# Patient Record
Sex: MALE | Race: WHITE | NOT HISPANIC OR LATINO
[De-identification: names, ages, dates, MRNs, and addresses within clinical notes are randomized per-mention and may not be internally consistent; named-entity substitution may affect disease eponyms.]

---

## 2017-01-05 ENCOUNTER — APPOINTMENT (OUTPATIENT)
Dept: BARIATRICS | Facility: CLINIC | Age: 57
End: 2017-01-05
Payer: COMMERCIAL

## 2017-01-05 VITALS
DIASTOLIC BLOOD PRESSURE: 69 MMHG | HEART RATE: 68 BPM | WEIGHT: 285.05 LBS | BODY MASS INDEX: 50.51 KG/M2 | SYSTOLIC BLOOD PRESSURE: 137 MMHG | HEIGHT: 63 IN

## 2017-01-05 PROCEDURE — 99215 OFFICE O/P EST HI 40 MIN: CPT

## 2017-01-19 ENCOUNTER — OUTPATIENT (OUTPATIENT)
Dept: OUTPATIENT SERVICES | Facility: HOSPITAL | Age: 57
LOS: 1 days | End: 2017-01-19
Payer: COMMERCIAL

## 2017-01-19 VITALS
TEMPERATURE: 99 F | SYSTOLIC BLOOD PRESSURE: 116 MMHG | DIASTOLIC BLOOD PRESSURE: 76 MMHG | HEIGHT: 63 IN | RESPIRATION RATE: 16 BRPM | WEIGHT: 285.06 LBS | HEART RATE: 66 BPM

## 2017-01-19 DIAGNOSIS — E11.9 TYPE 2 DIABETES MELLITUS WITHOUT COMPLICATIONS: ICD-10-CM

## 2017-01-19 DIAGNOSIS — E66.01 MORBID (SEVERE) OBESITY DUE TO EXCESS CALORIES: ICD-10-CM

## 2017-01-19 DIAGNOSIS — Z01.818 ENCOUNTER FOR OTHER PREPROCEDURAL EXAMINATION: ICD-10-CM

## 2017-01-19 DIAGNOSIS — Z98.890 OTHER SPECIFIED POSTPROCEDURAL STATES: Chronic | ICD-10-CM

## 2017-01-19 LAB
ANION GAP SERPL CALC-SCNC: 9 MMOL/L — SIGNIFICANT CHANGE UP (ref 5–17)
BLD GP AB SCN SERPL QL: SIGNIFICANT CHANGE UP
BUN SERPL-MCNC: 25 MG/DL — HIGH (ref 7–23)
CALCIUM SERPL-MCNC: 9.7 MG/DL — SIGNIFICANT CHANGE UP (ref 8.4–10.5)
CHLORIDE SERPL-SCNC: 100 MMOL/L — SIGNIFICANT CHANGE UP (ref 96–108)
CO2 SERPL-SCNC: 31 MMOL/L — SIGNIFICANT CHANGE UP (ref 22–31)
CREAT SERPL-MCNC: 1.29 MG/DL — SIGNIFICANT CHANGE UP (ref 0.5–1.3)
GLUCOSE SERPL-MCNC: 117 MG/DL — HIGH (ref 70–99)
HCT VFR BLD CALC: 36.8 % — LOW (ref 39–50)
HGB BLD-MCNC: 12.6 G/DL — LOW (ref 13–17)
MCHC RBC-ENTMCNC: 28.4 PG — SIGNIFICANT CHANGE UP (ref 27–34)
MCHC RBC-ENTMCNC: 34.4 GM/DL — SIGNIFICANT CHANGE UP (ref 32–36)
MCV RBC AUTO: 82.8 FL — SIGNIFICANT CHANGE UP (ref 80–100)
PLATELET # BLD AUTO: 224 K/UL — SIGNIFICANT CHANGE UP (ref 150–400)
POTASSIUM SERPL-MCNC: 4.8 MMOL/L — SIGNIFICANT CHANGE UP (ref 3.5–5.3)
POTASSIUM SERPL-SCNC: 4.8 MMOL/L — SIGNIFICANT CHANGE UP (ref 3.5–5.3)
RBC # BLD: 4.45 M/UL — SIGNIFICANT CHANGE UP (ref 4.2–5.8)
RBC # FLD: 12.8 % — SIGNIFICANT CHANGE UP (ref 10.3–14.5)
SODIUM SERPL-SCNC: 140 MMOL/L — SIGNIFICANT CHANGE UP (ref 135–145)
WBC # BLD: 9.8 K/UL — SIGNIFICANT CHANGE UP (ref 3.8–10.5)
WBC # FLD AUTO: 9.8 K/UL — SIGNIFICANT CHANGE UP (ref 3.8–10.5)

## 2017-01-19 PROCEDURE — 80048 BASIC METABOLIC PNL TOTAL CA: CPT

## 2017-01-19 PROCEDURE — 36415 COLL VENOUS BLD VENIPUNCTURE: CPT

## 2017-01-19 PROCEDURE — 85027 COMPLETE CBC AUTOMATED: CPT

## 2017-01-19 PROCEDURE — 86850 RBC ANTIBODY SCREEN: CPT

## 2017-01-19 PROCEDURE — 83036 HEMOGLOBIN GLYCOSYLATED A1C: CPT

## 2017-01-19 PROCEDURE — 86901 BLOOD TYPING SEROLOGIC RH(D): CPT

## 2017-01-19 PROCEDURE — 93010 ELECTROCARDIOGRAM REPORT: CPT

## 2017-01-19 PROCEDURE — 93005 ELECTROCARDIOGRAM TRACING: CPT

## 2017-01-19 PROCEDURE — G0463: CPT

## 2017-01-19 PROCEDURE — 86900 BLOOD TYPING SEROLOGIC ABO: CPT

## 2017-01-19 NOTE — H&P PST ADULT - FAMILY HISTORY
Father  Still living? No  Family history of cerebrovascular accident (CVA), Age at diagnosis: Age Unknown     Mother  Still living? No  Family history of coronary artery disease, Age at diagnosis: Age Unknown

## 2017-01-19 NOTE — H&P PST ADULT - PROBLEM SELECTOR PLAN 2
Pt reports allergic reaction to starch in linens and spreads.  This will be relayed to laundry personnel through .

## 2017-01-19 NOTE — H&P PST ADULT - ANESTHESIA, PREVIOUS REACTION, PROFILE
pt becomes extremely violent after general anesthesia, reports punching and kicking and pulling out all tubes.

## 2017-01-19 NOTE — H&P PST ADULT - PSH
History of Arthroscopic Knee Surgery  right  S/P cystoscopy  2003- removal of bladder tumor 2003  S/P Cystoscopy  every year;

## 2017-01-19 NOTE — H&P PST ADULT - HISTORY OF PRESENT ILLNESS
55 yo male presents with long history of morbid obesity and numerous unsuccessful attempts at weight loss.

## 2017-01-20 DIAGNOSIS — T78.40XA ALLERGY, UNSPECIFIED, INITIAL ENCOUNTER: ICD-10-CM

## 2017-01-20 LAB — HBA1C BLD-MCNC: 7.2 % — HIGH (ref 4–5.6)

## 2017-01-26 ENCOUNTER — APPOINTMENT (OUTPATIENT)
Dept: INTERNAL MEDICINE | Facility: CLINIC | Age: 57
End: 2017-01-26

## 2017-01-26 VITALS — WEIGHT: 280 LBS | BODY MASS INDEX: 50.24 KG/M2 | HEIGHT: 62.5 IN

## 2017-01-26 VITALS — SYSTOLIC BLOOD PRESSURE: 130 MMHG | DIASTOLIC BLOOD PRESSURE: 70 MMHG

## 2017-02-01 ENCOUNTER — APPOINTMENT (OUTPATIENT)
Dept: BARIATRICS | Facility: CLINIC | Age: 57
End: 2017-02-01

## 2017-02-01 VITALS
SYSTOLIC BLOOD PRESSURE: 122 MMHG | BODY MASS INDEX: 48.98 KG/M2 | HEIGHT: 63 IN | DIASTOLIC BLOOD PRESSURE: 70 MMHG | WEIGHT: 276.46 LBS

## 2017-02-02 RX ORDER — PANTOPRAZOLE SODIUM 20 MG/1
40 TABLET, DELAYED RELEASE ORAL DAILY
Qty: 0 | Refills: 0 | Status: DISCONTINUED | OUTPATIENT
Start: 2017-02-08 | End: 2017-02-09

## 2017-02-02 RX ORDER — ENOXAPARIN SODIUM 100 MG/ML
40 INJECTION SUBCUTANEOUS EVERY 12 HOURS
Qty: 0 | Refills: 0 | Status: DISCONTINUED | OUTPATIENT
Start: 2017-02-08 | End: 2017-02-09

## 2017-02-02 RX ORDER — DEXTROSE 50 % IN WATER 50 %
25 SYRINGE (ML) INTRAVENOUS ONCE
Qty: 0 | Refills: 0 | Status: DISCONTINUED | OUTPATIENT
Start: 2017-02-08 | End: 2017-02-09

## 2017-02-02 RX ORDER — HYDROMORPHONE HYDROCHLORIDE 2 MG/ML
0.5 INJECTION INTRAMUSCULAR; INTRAVENOUS; SUBCUTANEOUS EVERY 4 HOURS
Qty: 0 | Refills: 0 | Status: DISCONTINUED | OUTPATIENT
Start: 2017-02-08 | End: 2017-02-09

## 2017-02-02 RX ORDER — METOCLOPRAMIDE HCL 10 MG
10 TABLET ORAL EVERY 6 HOURS
Qty: 0 | Refills: 0 | Status: DISCONTINUED | OUTPATIENT
Start: 2017-02-08 | End: 2017-02-09

## 2017-02-02 RX ORDER — HYDROMORPHONE HYDROCHLORIDE 2 MG/ML
1 INJECTION INTRAMUSCULAR; INTRAVENOUS; SUBCUTANEOUS EVERY 4 HOURS
Qty: 0 | Refills: 0 | Status: DISCONTINUED | OUTPATIENT
Start: 2017-02-08 | End: 2017-02-09

## 2017-02-02 RX ORDER — DEXTROSE 50 % IN WATER 50 %
1 SYRINGE (ML) INTRAVENOUS ONCE
Qty: 0 | Refills: 0 | Status: DISCONTINUED | OUTPATIENT
Start: 2017-02-08 | End: 2017-02-09

## 2017-02-02 RX ORDER — DEXTROSE 50 % IN WATER 50 %
12.5 SYRINGE (ML) INTRAVENOUS ONCE
Qty: 0 | Refills: 0 | Status: DISCONTINUED | OUTPATIENT
Start: 2017-02-08 | End: 2017-02-09

## 2017-02-02 RX ORDER — GLUCAGON INJECTION, SOLUTION 0.5 MG/.1ML
1 INJECTION, SOLUTION SUBCUTANEOUS ONCE
Qty: 0 | Refills: 0 | Status: DISCONTINUED | OUTPATIENT
Start: 2017-02-08 | End: 2017-02-09

## 2017-02-02 RX ORDER — SODIUM CHLORIDE 9 MG/ML
1000 INJECTION, SOLUTION INTRAVENOUS
Qty: 0 | Refills: 0 | Status: DISCONTINUED | OUTPATIENT
Start: 2017-02-08 | End: 2017-02-09

## 2017-02-02 RX ORDER — INSULIN LISPRO 100/ML
VIAL (ML) SUBCUTANEOUS
Qty: 0 | Refills: 0 | Status: DISCONTINUED | OUTPATIENT
Start: 2017-02-08 | End: 2017-02-09

## 2017-02-02 RX ORDER — INSULIN LISPRO 100/ML
VIAL (ML) SUBCUTANEOUS AT BEDTIME
Qty: 0 | Refills: 0 | Status: DISCONTINUED | OUTPATIENT
Start: 2017-02-08 | End: 2017-02-09

## 2017-02-02 RX ORDER — ONDANSETRON 8 MG/1
4 TABLET, FILM COATED ORAL EVERY 6 HOURS
Qty: 0 | Refills: 0 | Status: DISCONTINUED | OUTPATIENT
Start: 2017-02-08 | End: 2017-02-09

## 2017-02-06 ENCOUNTER — MEDICATION RENEWAL (OUTPATIENT)
Age: 57
End: 2017-02-06

## 2017-02-07 ENCOUNTER — RESULT REVIEW (OUTPATIENT)
Age: 57
End: 2017-02-07

## 2017-02-07 ENCOUNTER — RESULT CHARGE (OUTPATIENT)
Age: 57
End: 2017-02-07

## 2017-02-07 LAB
BILIRUB UR QL STRIP: NEGATIVE
CLARITY UR: CLEAR
COLLECTION METHOD: NORMAL
GLUCOSE UR-MCNC: NEGATIVE
HCG UR QL: 0.2 EU/DL
HGB UR QL STRIP.AUTO: NEGATIVE
KETONES UR-MCNC: NEGATIVE
LEUKOCYTE ESTERASE UR QL STRIP: NEGATIVE
NITRITE UR QL STRIP: NEGATIVE
PH UR STRIP: 5
PROT UR STRIP-MCNC: NEGATIVE
SP GR UR STRIP: 1.02

## 2017-02-07 NOTE — PATIENT PROFILE ADULT. - PMH
Allergy  STARCH IN SHEETS AND LINENS  Bladder Cancer  since 2003, tumor removed and now reportedly in remission  Cholecystitis    Diabetes Mellitus Type II    Febrile Seizure  during childhood  Herniated Disc  lumbar-  PT CANNOT LIE ON BACK FOR A LONG TIME.  WANTS TO BE POSITIONED ON SIDE WITH 2 TOWELS UNDER HEAD  HTN (Hypertension)    Morbidly Obese    Nevus comedonicus of face  removed 25 years ago, via flap  Obstructive Sleep Apnea  pt uses CPAP at 12 cm, uses nasal mask at home

## 2017-02-08 ENCOUNTER — APPOINTMENT (OUTPATIENT)
Dept: BARIATRICS | Facility: HOSPITAL | Age: 57
End: 2017-02-08

## 2017-02-08 ENCOUNTER — TRANSCRIPTION ENCOUNTER (OUTPATIENT)
Age: 57
End: 2017-02-08

## 2017-02-08 ENCOUNTER — INPATIENT (INPATIENT)
Facility: HOSPITAL | Age: 57
LOS: 0 days | Discharge: HOME | DRG: 621 | End: 2017-02-09
Attending: SURGERY | Admitting: SURGERY
Payer: COMMERCIAL

## 2017-02-08 VITALS
SYSTOLIC BLOOD PRESSURE: 117 MMHG | DIASTOLIC BLOOD PRESSURE: 76 MMHG | OXYGEN SATURATION: 98 % | HEIGHT: 63 IN | TEMPERATURE: 98 F | HEART RATE: 76 BPM | WEIGHT: 270.95 LBS | RESPIRATION RATE: 12 BRPM

## 2017-02-08 DIAGNOSIS — E11.9 TYPE 2 DIABETES MELLITUS WITHOUT COMPLICATIONS: ICD-10-CM

## 2017-02-08 DIAGNOSIS — Z98.84 BARIATRIC SURGERY STATUS: Chronic | ICD-10-CM

## 2017-02-08 DIAGNOSIS — Z98.890 OTHER SPECIFIED POSTPROCEDURAL STATES: Chronic | ICD-10-CM

## 2017-02-08 LAB — ABO RH CONFIRMATION: SIGNIFICANT CHANGE UP

## 2017-02-08 PROCEDURE — 43281 LAP PARAESOPHAG HERN REPAIR: CPT | Mod: 59

## 2017-02-08 PROCEDURE — 43775 LAP SLEEVE GASTRECTOMY: CPT

## 2017-02-08 PROCEDURE — 43775 LAP SLEEVE GASTRECTOMY: CPT | Mod: AS

## 2017-02-08 PROCEDURE — 88305 TISSUE EXAM BY PATHOLOGIST: CPT | Mod: 26

## 2017-02-08 PROCEDURE — 88302 TISSUE EXAM BY PATHOLOGIST: CPT | Mod: 26

## 2017-02-08 PROCEDURE — 43281 LAP PARAESOPHAG HERN REPAIR: CPT | Mod: AS,59

## 2017-02-08 RX ORDER — ACETAMINOPHEN 500 MG
1000 TABLET ORAL ONCE
Qty: 0 | Refills: 0 | Status: COMPLETED | OUTPATIENT
Start: 2017-02-08 | End: 2017-02-08

## 2017-02-08 RX ORDER — SODIUM CHLORIDE 9 MG/ML
1000 INJECTION, SOLUTION INTRAVENOUS
Qty: 0 | Refills: 0 | Status: DISCONTINUED | OUTPATIENT
Start: 2017-02-08 | End: 2017-02-08

## 2017-02-08 RX ORDER — ENOXAPARIN SODIUM 100 MG/ML
40 INJECTION SUBCUTANEOUS ONCE
Qty: 0 | Refills: 0 | Status: COMPLETED | OUTPATIENT
Start: 2017-02-08 | End: 2017-02-08

## 2017-02-08 RX ORDER — ACETAMINOPHEN 500 MG
1000 TABLET ORAL EVERY 6 HOURS
Qty: 0 | Refills: 0 | Status: COMPLETED | OUTPATIENT
Start: 2017-02-08 | End: 2017-02-09

## 2017-02-08 RX ORDER — ONDANSETRON 8 MG/1
4 TABLET, FILM COATED ORAL ONCE
Qty: 0 | Refills: 0 | Status: COMPLETED | OUTPATIENT
Start: 2017-02-08 | End: 2017-02-08

## 2017-02-08 RX ORDER — HYDROMORPHONE HYDROCHLORIDE 2 MG/ML
0.5 INJECTION INTRAMUSCULAR; INTRAVENOUS; SUBCUTANEOUS
Qty: 0 | Refills: 0 | Status: DISCONTINUED | OUTPATIENT
Start: 2017-02-08 | End: 2017-02-08

## 2017-02-08 RX ORDER — APREPITANT 80 MG/1
40 CAPSULE ORAL ONCE
Qty: 0 | Refills: 0 | Status: COMPLETED | OUTPATIENT
Start: 2017-02-08 | End: 2017-02-08

## 2017-02-08 RX ORDER — CIPROFLOXACIN LACTATE 400MG/40ML
400 VIAL (ML) INTRAVENOUS ONCE
Qty: 0 | Refills: 0 | Status: COMPLETED | OUTPATIENT
Start: 2017-02-08 | End: 2017-02-08

## 2017-02-08 RX ADMIN — Medication 1000 MILLIGRAM(S): at 16:15

## 2017-02-08 RX ADMIN — ENOXAPARIN SODIUM 40 MILLIGRAM(S): 100 INJECTION SUBCUTANEOUS at 19:16

## 2017-02-08 RX ADMIN — ENOXAPARIN SODIUM 40 MILLIGRAM(S): 100 INJECTION SUBCUTANEOUS at 07:36

## 2017-02-08 RX ADMIN — Medication 10 MILLIGRAM(S): at 11:45

## 2017-02-08 RX ADMIN — APREPITANT 40 MILLIGRAM(S): 80 CAPSULE ORAL at 07:36

## 2017-02-08 RX ADMIN — Medication 10 MILLIGRAM(S): at 23:39

## 2017-02-08 RX ADMIN — Medication 400 MILLIGRAM(S): at 22:14

## 2017-02-08 RX ADMIN — HYDROMORPHONE HYDROCHLORIDE 0.5 MILLIGRAM(S): 2 INJECTION INTRAMUSCULAR; INTRAVENOUS; SUBCUTANEOUS at 22:33

## 2017-02-08 RX ADMIN — SODIUM CHLORIDE 100 MILLILITER(S): 9 INJECTION, SOLUTION INTRAVENOUS at 11:59

## 2017-02-08 RX ADMIN — SODIUM CHLORIDE 500 MILLILITER(S): 9 INJECTION, SOLUTION INTRAVENOUS at 07:29

## 2017-02-08 RX ADMIN — Medication 10 MILLIGRAM(S): at 18:14

## 2017-02-08 RX ADMIN — ONDANSETRON 4 MILLIGRAM(S): 8 TABLET, FILM COATED ORAL at 22:04

## 2017-02-08 RX ADMIN — ONDANSETRON 4 MILLIGRAM(S): 8 TABLET, FILM COATED ORAL at 14:30

## 2017-02-08 RX ADMIN — Medication 400 MILLIGRAM(S): at 15:45

## 2017-02-08 RX ADMIN — HYDROMORPHONE HYDROCHLORIDE 0.5 MILLIGRAM(S): 2 INJECTION INTRAMUSCULAR; INTRAVENOUS; SUBCUTANEOUS at 22:03

## 2017-02-08 NOTE — DISCHARGE NOTE ADULT - PLAN OF CARE
Instructed to ambulate and use incentive spirometry frequently. Ice packs to abdominal wall and shoulders as needed for discomfort. Cont bariatric diet and follow nutritional guidelines as instructed. Pain meds as needed by MD. Pt instructed  to follow up with Dr. Garcia in 1 week. Restriction of dietary intake and return to normal BMI.

## 2017-02-08 NOTE — DISCHARGE NOTE ADULT - NS AS ACTIVITY OBS
Do not make important decisions/Stairs allowed/Showering allowed/Walking-Outdoors allowed/Driving allowed/Walking-Indoors allowed

## 2017-02-08 NOTE — DISCHARGE NOTE ADULT - FINDINGS/TREATMENT
Tolerated procedure well. Tolerated advancement of diet. Hemodynamically stable. Ambulating without any difficulties. Prepared for discharge to home today. Ice packs to abdominal wall and shoulders as needed for discomfort. Cont bariatric diet and follow nutritional guidelines as instructed. Pain meds as needed by MD.

## 2017-02-08 NOTE — DISCHARGE NOTE ADULT - INSTRUCTIONS
Instructed to ambulate and use incentive spirometry frequently. Ice packs to abdominal wall and shoulders as needed for discomfort. Cont bariatric diet and follow nutritional guidelines as instructed. Pain meds as needed by MD. Pt instructed  to follow up with Dr. Garcia in 1 week.

## 2017-02-08 NOTE — DISCHARGE NOTE ADULT - CARE PROVIDER_API CALL
Angelo Garcia), Surgery  [address] [place] [address]  Phone: [phone]  Fax: [phone]

## 2017-02-08 NOTE — DISCHARGE NOTE ADULT - MEDICATION SUMMARY - MEDICATIONS TO TAKE
I will START or STAY ON the medications listed below when I get home from the hospital:    benazepril 40 mg oral tablet  -- 1 tab(s) by mouth once a day  -- Indication: For Hypertension    doxazosin 8 mg oral tablet  -- 1 tab(s) by mouth once a day  -- Indication: For Hypertension    metFORMIN 500 mg oral tablet  -- 1 tab(s) by mouth 2 times a day  -- Indication: For diabetes    losartan-hydroCHLOROthiazide 50mg-12.5mg oral tablet  -- 1 tab(s) by mouth once a day  -- Indication: For Hypertension    triamterene-hydroCHLOROthiazide 37.5mg-25mg oral tablet  -- 1 tab(s) by mouth once a day  -- Indication: For Hypertension    atenolol 100 mg oral tablet  -- 0.5 tab(s) by mouth 2 times a day  -- Indication: For Hypertension    amLODIPine 10 mg oral tablet  -- 1 tab(s) by mouth once a day  -- Indication: For Hypertension    Klor-Con Sprinkle 8 mEq oral capsule, extended release  -- 1 cap(s) by mouth once a day  -- Indication: For potassium replacement    omeprazole 20 mg oral delayed release capsule  -- 1 cap(s) by mouth once a day  -- It is very important that you take or use this exactly as directed.  Do not skip doses or discontinue unless directed by your doctor.  Obtain medical advice before taking any non-prescription drugs as some may affect the action of this medication.  Swallow whole.  Do not crush.    -- Indication: For antacid    Vitamin D3 1000 intl units oral capsule  -- 1 cap(s) by mouth once a day  -- Indication: For vitamin supplement    Vitamin B12 1000 mcg oral tablet  -- 1 tab(s) by mouth once a day  -- Indication: For vitamin supplement

## 2017-02-08 NOTE — DISCHARGE NOTE ADULT - MEDICATION SUMMARY - MEDICATIONS TO STOP TAKING
I will STOP taking the medications listed below when I get home from the hospital:    Onglyza 5 mg oral tablet  -- 1 tab(s) by mouth once a day    ibuprofen 200 mg oral tablet  -- 1 tab(s) by mouth prn  -- will stop 10 days pre op

## 2017-02-08 NOTE — DISCHARGE NOTE ADULT - HOSPITAL COURSE
57 yo M  with history of morbid obesity s/p laparoscopic sleeve gastrectomy and intra- operative EGD and hiatal hernia repair.  Post operatively patient did well, good urine output and ambulating well on floor. Pt advanced to bariatric clears which she tolerated . Nutritional guidelines were reviewed with the nutritionist. Patient felt ready for discharge to home. Pt instructed to drink small frequent amounts, start protein drinks and follow dietary guidelines. Instructed to ambulate and use incentive spirometry frequently. Pt to follow up with Dr. Garcia in 1 week and call with any questions or concerns.

## 2017-02-08 NOTE — DISCHARGE NOTE ADULT - REASON FOR ADMISSION
55 yo M  with PMHx of morbid obesity admitted to [facility] for scheduled laparoscopic sleeve gastrectomy and intra-operative EGD and hiatal hernia repair.

## 2017-02-08 NOTE — DISCHARGE NOTE ADULT - CARE PROVIDERS DIRECT ADDRESSES
[email or web address]

## 2017-02-08 NOTE — DISCHARGE NOTE ADULT - PATIENT PORTAL LINK FT
“You can access the [facility] Patient Portal, offered by [facility], by registering with the following website: [email or web address]”

## 2017-02-08 NOTE — DISCHARGE NOTE ADULT - CARE PLAN
Principal Discharge DX:	Morbid obesity, unspecified obesity type  Goal:	Restriction of dietary intake and return to normal BMI.  Instructions for follow-up, activity and diet:	Instructed to ambulate and use incentive spirometry frequently. Ice packs to abdominal wall and shoulders as needed for discomfort. Cont bariatric diet and follow nutritional guidelines as instructed. Pain meds as needed by MD. Pt instructed  to follow up with Dr. Garcia in 1 week.  Secondary Diagnosis:	S/P laparoscopic sleeve gastrectomy  Goal:	Restriction of dietary intake and return to normal BMI.  Instructions for follow-up, activity and diet:	Instructed to ambulate and use incentive spirometry frequently. Ice packs to abdominal wall and shoulders as needed for discomfort. Cont bariatric diet and follow nutritional guidelines as instructed. Pain meds as needed by MD. Pt instructed  to follow up with Dr. Garcia in 1 week.  Secondary Diagnosis:	Hiatal hernia  Goal:	Restriction of dietary intake and return to normal BMI.  Instructions for follow-up, activity and diet:	Instructed to ambulate and use incentive spirometry frequently. Ice packs to abdominal wall and shoulders as needed for discomfort. Cont bariatric diet and follow nutritional guidelines as instructed. Pain meds as needed by MD. Pt instructed  to follow up with Dr. Garcia in 1 week.

## 2017-02-09 ENCOUNTER — TRANSCRIPTION ENCOUNTER (OUTPATIENT)
Age: 57
End: 2017-02-09

## 2017-02-09 VITALS
OXYGEN SATURATION: 98 % | TEMPERATURE: 98 F | HEART RATE: 73 BPM | SYSTOLIC BLOOD PRESSURE: 118 MMHG | RESPIRATION RATE: 18 BRPM | DIASTOLIC BLOOD PRESSURE: 72 MMHG

## 2017-02-09 LAB
ANION GAP SERPL CALC-SCNC: 5 MMOL/L — SIGNIFICANT CHANGE UP (ref 5–17)
BASOPHILS # BLD AUTO: 0 K/UL — SIGNIFICANT CHANGE UP (ref 0–0.2)
BASOPHILS NFR BLD AUTO: 0.4 % — SIGNIFICANT CHANGE UP (ref 0–2)
BUN SERPL-MCNC: 24 MG/DL — HIGH (ref 7–23)
CALCIUM SERPL-MCNC: 9.7 MG/DL — SIGNIFICANT CHANGE UP (ref 8.4–10.5)
CHLORIDE SERPL-SCNC: 102 MMOL/L — SIGNIFICANT CHANGE UP (ref 96–108)
CO2 SERPL-SCNC: 29 MMOL/L — SIGNIFICANT CHANGE UP (ref 22–31)
CREAT SERPL-MCNC: 1.18 MG/DL — SIGNIFICANT CHANGE UP (ref 0.5–1.3)
EOSINOPHIL # BLD AUTO: 0 K/UL — SIGNIFICANT CHANGE UP (ref 0–0.5)
EOSINOPHIL NFR BLD AUTO: 0.4 % — SIGNIFICANT CHANGE UP (ref 0–6)
GLUCOSE SERPL-MCNC: 122 MG/DL — HIGH (ref 70–99)
HCT VFR BLD CALC: 33.5 % — LOW (ref 39–50)
HGB BLD-MCNC: 11 G/DL — LOW (ref 13–17)
LYMPHOCYTES # BLD AUTO: 0.8 K/UL — LOW (ref 1–3.3)
LYMPHOCYTES # BLD AUTO: 7.2 % — LOW (ref 13–44)
MCHC RBC-ENTMCNC: 28.8 PG — SIGNIFICANT CHANGE UP (ref 27–34)
MCHC RBC-ENTMCNC: 32.8 GM/DL — SIGNIFICANT CHANGE UP (ref 32–36)
MCV RBC AUTO: 87.9 FL — SIGNIFICANT CHANGE UP (ref 80–100)
MONOCYTES # BLD AUTO: 1.2 K/UL — HIGH (ref 0–0.9)
MONOCYTES NFR BLD AUTO: 10.3 % — SIGNIFICANT CHANGE UP (ref 2–14)
NEUTROPHILS # BLD AUTO: 9.6 K/UL — HIGH (ref 1.8–7.4)
NEUTROPHILS NFR BLD AUTO: 81.8 % — HIGH (ref 43–77)
PLATELET # BLD AUTO: 206 K/UL — SIGNIFICANT CHANGE UP (ref 150–400)
POTASSIUM SERPL-MCNC: 4.3 MMOL/L — SIGNIFICANT CHANGE UP (ref 3.5–5.3)
POTASSIUM SERPL-SCNC: 4.3 MMOL/L — SIGNIFICANT CHANGE UP (ref 3.5–5.3)
RBC # BLD: 3.82 M/UL — LOW (ref 4.2–5.8)
RBC # FLD: 12.4 % — SIGNIFICANT CHANGE UP (ref 10.3–14.5)
SODIUM SERPL-SCNC: 136 MMOL/L — SIGNIFICANT CHANGE UP (ref 135–145)
WBC # BLD: 11.8 K/UL — HIGH (ref 3.8–10.5)
WBC # FLD AUTO: 11.8 K/UL — HIGH (ref 3.8–10.5)

## 2017-02-09 PROCEDURE — 99024 POSTOP FOLLOW-UP VISIT: CPT

## 2017-02-09 RX ORDER — OMEPRAZOLE 10 MG/1
1 CAPSULE, DELAYED RELEASE ORAL
Qty: 30 | Refills: 3 | OUTPATIENT
Start: 2017-02-09 | End: 2017-06-08

## 2017-02-09 RX ORDER — OXYCODONE HYDROCHLORIDE 5 MG/1
1 TABLET ORAL
Qty: 20 | Refills: 0 | OUTPATIENT
Start: 2017-02-09 | End: 2017-02-14

## 2017-02-09 RX ORDER — IBUPROFEN 200 MG
1 TABLET ORAL
Qty: 0 | Refills: 0 | COMMUNITY

## 2017-02-09 RX ADMIN — ENOXAPARIN SODIUM 40 MILLIGRAM(S): 100 INJECTION SUBCUTANEOUS at 06:30

## 2017-02-09 RX ADMIN — ONDANSETRON 4 MILLIGRAM(S): 8 TABLET, FILM COATED ORAL at 02:31

## 2017-02-09 RX ADMIN — Medication 10 MILLIGRAM(S): at 17:46

## 2017-02-09 RX ADMIN — HYDROMORPHONE HYDROCHLORIDE 0.5 MILLIGRAM(S): 2 INJECTION INTRAMUSCULAR; INTRAVENOUS; SUBCUTANEOUS at 13:00

## 2017-02-09 RX ADMIN — HYDROMORPHONE HYDROCHLORIDE 0.5 MILLIGRAM(S): 2 INJECTION INTRAMUSCULAR; INTRAVENOUS; SUBCUTANEOUS at 18:05

## 2017-02-09 RX ADMIN — Medication 10 MILLIGRAM(S): at 12:05

## 2017-02-09 RX ADMIN — ONDANSETRON 4 MILLIGRAM(S): 8 TABLET, FILM COATED ORAL at 14:20

## 2017-02-09 RX ADMIN — HYDROMORPHONE HYDROCHLORIDE 0.5 MILLIGRAM(S): 2 INJECTION INTRAMUSCULAR; INTRAVENOUS; SUBCUTANEOUS at 12:04

## 2017-02-09 RX ADMIN — Medication 10 MILLIGRAM(S): at 06:30

## 2017-02-09 RX ADMIN — Medication 1000 MILLIGRAM(S): at 06:27

## 2017-02-09 RX ADMIN — Medication 1000 MILLIGRAM(S): at 01:46

## 2017-02-09 RX ADMIN — Medication 400 MILLIGRAM(S): at 02:31

## 2017-02-09 RX ADMIN — ONDANSETRON 4 MILLIGRAM(S): 8 TABLET, FILM COATED ORAL at 09:01

## 2017-02-09 RX ADMIN — PANTOPRAZOLE SODIUM 40 MILLIGRAM(S): 20 TABLET, DELAYED RELEASE ORAL at 12:05

## 2017-02-09 RX ADMIN — HYDROMORPHONE HYDROCHLORIDE 0.5 MILLIGRAM(S): 2 INJECTION INTRAMUSCULAR; INTRAVENOUS; SUBCUTANEOUS at 17:46

## 2017-02-10 PROBLEM — K81.9 CHOLECYSTITIS, UNSPECIFIED: Chronic | Status: ACTIVE | Noted: 2017-01-19

## 2017-02-10 PROBLEM — Q82.5 CONGENITAL NON-NEOPLASTIC NEVUS: Chronic | Status: ACTIVE | Noted: 2017-01-19

## 2017-02-10 PROBLEM — T78.40XA ALLERGY, UNSPECIFIED, INITIAL ENCOUNTER: Chronic | Status: ACTIVE | Noted: 2017-01-20

## 2017-02-15 ENCOUNTER — APPOINTMENT (OUTPATIENT)
Dept: BARIATRICS | Facility: CLINIC | Age: 57
End: 2017-02-15

## 2017-02-15 VITALS
SYSTOLIC BLOOD PRESSURE: 108 MMHG | HEIGHT: 63 IN | BODY MASS INDEX: 47.11 KG/M2 | DIASTOLIC BLOOD PRESSURE: 76 MMHG | WEIGHT: 265.87 LBS

## 2017-02-16 ENCOUNTER — CHART COPY (OUTPATIENT)
Age: 57
End: 2017-02-16

## 2017-02-18 PROCEDURE — 94664 DEMO&/EVAL PT USE INHALER: CPT

## 2017-02-18 PROCEDURE — 85027 COMPLETE CBC AUTOMATED: CPT

## 2017-02-18 PROCEDURE — 88302 TISSUE EXAM BY PATHOLOGIST: CPT

## 2017-02-18 PROCEDURE — 94660 CPAP INITIATION&MGMT: CPT

## 2017-02-18 PROCEDURE — C1889: CPT

## 2017-02-18 PROCEDURE — 88305 TISSUE EXAM BY PATHOLOGIST: CPT

## 2017-02-18 PROCEDURE — 36415 COLL VENOUS BLD VENIPUNCTURE: CPT

## 2017-02-18 PROCEDURE — 80048 BASIC METABOLIC PNL TOTAL CA: CPT

## 2017-02-22 ENCOUNTER — RECORD ABSTRACTING (OUTPATIENT)
Age: 57
End: 2017-02-22

## 2017-02-23 ENCOUNTER — RESULT CHARGE (OUTPATIENT)
Age: 57
End: 2017-02-23

## 2017-02-23 ENCOUNTER — LABORATORY RESULT (OUTPATIENT)
Age: 57
End: 2017-02-23

## 2017-02-23 ENCOUNTER — APPOINTMENT (OUTPATIENT)
Dept: INTERNAL MEDICINE | Facility: CLINIC | Age: 57
End: 2017-02-23

## 2017-02-23 VITALS — BODY MASS INDEX: 47.11 KG/M2 | WEIGHT: 256 LBS | HEIGHT: 62 IN

## 2017-02-23 VITALS — SYSTOLIC BLOOD PRESSURE: 110 MMHG | DIASTOLIC BLOOD PRESSURE: 70 MMHG

## 2017-02-24 LAB
25(OH)D3 SERPL-MCNC: 44.1 NG/ML
ALBUMIN SERPL ELPH-MCNC: 4.6 G/DL
ALP BLD-CCNC: 79 U/L
ALT SERPL-CCNC: 26 U/L
ANION GAP SERPL CALC-SCNC: 22 MMOL/L
AST SERPL-CCNC: 19 U/L
BASOPHILS # BLD AUTO: 0.07 K/UL
BASOPHILS NFR BLD AUTO: 0.9 %
BILIRUB SERPL-MCNC: 0.4 MG/DL
BUN SERPL-MCNC: 47 MG/DL
CALCIUM SERPL-MCNC: 10.9 MG/DL
CHLORIDE SERPL-SCNC: 96 MMOL/L
CHOLEST SERPL-MCNC: 131 MG/DL
CHOLEST/HDLC SERPL: 3.7 RATIO
CO2 SERPL-SCNC: 21 MMOL/L
CREAT SERPL-MCNC: 1.9 MG/DL
EOSINOPHIL # BLD AUTO: 0.43 K/UL
EOSINOPHIL NFR BLD AUTO: 5.8 %
GLUCOSE SERPL-MCNC: 98 MG/DL
HBA1C MFR BLD HPLC: 6.3 %
HCT VFR BLD CALC: 38.8 %
HDLC SERPL-MCNC: 35 MG/DL
HGB BLD-MCNC: 12.8 G/DL
IMM GRANULOCYTES NFR BLD AUTO: 0 %
LDLC SERPL CALC-MCNC: 55 MG/DL
LYMPHOCYTES # BLD AUTO: 1.36 K/UL
LYMPHOCYTES NFR BLD AUTO: 18.4 %
MAN DIFF?: NORMAL
MCHC RBC-ENTMCNC: 28.9 PG
MCHC RBC-ENTMCNC: 33 GM/DL
MCV RBC AUTO: 87.6 FL
MONOCYTES # BLD AUTO: 0.88 K/UL
MONOCYTES NFR BLD AUTO: 11.9 %
NEUTROPHILS # BLD AUTO: 4.65 K/UL
NEUTROPHILS NFR BLD AUTO: 63 %
PLATELET # BLD AUTO: 283 K/UL
POTASSIUM SERPL-SCNC: 4.2 MMOL/L
PROT SERPL-MCNC: 8.4 G/DL
RBC # BLD: 4.43 M/UL
RBC # FLD: 14.2 %
SAVE SPECIMEN: NORMAL
SODIUM SERPL-SCNC: 139 MMOL/L
T3RU NFR SERPL: 0.95 INDEX
T4 SERPL-MCNC: 9.7 UG/DL
TRIGL SERPL-MCNC: 204 MG/DL
TSH SERPL-ACNC: 0.9 UIU/ML
URATE SERPL-MCNC: 12.1 MG/DL
WBC # FLD AUTO: 7.39 K/UL

## 2017-03-06 ENCOUNTER — RX RENEWAL (OUTPATIENT)
Age: 57
End: 2017-03-06

## 2017-03-23 ENCOUNTER — OUTPATIENT (OUTPATIENT)
Dept: OUTPATIENT SERVICES | Facility: HOSPITAL | Age: 57
LOS: 1 days | End: 2017-03-23
Payer: COMMERCIAL

## 2017-03-23 ENCOUNTER — APPOINTMENT (OUTPATIENT)
Dept: BARIATRICS | Facility: CLINIC | Age: 57
End: 2017-03-23

## 2017-03-23 VITALS
DIASTOLIC BLOOD PRESSURE: 52 MMHG | HEIGHT: 62 IN | WEIGHT: 235.01 LBS | BODY MASS INDEX: 43.25 KG/M2 | SYSTOLIC BLOOD PRESSURE: 96 MMHG

## 2017-03-23 DIAGNOSIS — E66.01 MORBID (SEVERE) OBESITY DUE TO EXCESS CALORIES: ICD-10-CM

## 2017-03-23 DIAGNOSIS — Z98.84 BARIATRIC SURGERY STATUS: ICD-10-CM

## 2017-03-23 DIAGNOSIS — Z98.890 OTHER SPECIFIED POSTPROCEDURAL STATES: Chronic | ICD-10-CM

## 2017-03-23 DIAGNOSIS — R11.10 VOMITING, UNSPECIFIED: ICD-10-CM

## 2017-03-23 PROCEDURE — 74220 X-RAY XM ESOPHAGUS 1CNTRST: CPT | Mod: 26

## 2017-03-23 PROCEDURE — 74220 X-RAY XM ESOPHAGUS 1CNTRST: CPT

## 2017-03-23 RX ORDER — OXYCODONE AND ACETAMINOPHEN 5; 325 MG/1; MG/1
5-325 TABLET ORAL
Qty: 20 | Refills: 0 | Status: DISCONTINUED | COMMUNITY
Start: 2017-02-09 | End: 2017-03-23

## 2017-04-02 ENCOUNTER — INPATIENT (INPATIENT)
Facility: HOSPITAL | Age: 57
LOS: 3 days | Discharge: HOME | DRG: 683 | End: 2017-04-06
Attending: SURGERY | Admitting: SURGERY
Payer: MEDICAID

## 2017-04-02 VITALS
RESPIRATION RATE: 16 BRPM | HEART RATE: 70 BPM | SYSTOLIC BLOOD PRESSURE: 118 MMHG | DIASTOLIC BLOOD PRESSURE: 58 MMHG | WEIGHT: 231.04 LBS | TEMPERATURE: 98 F | OXYGEN SATURATION: 100 % | HEIGHT: 63 IN

## 2017-04-02 DIAGNOSIS — E11.9 TYPE 2 DIABETES MELLITUS WITHOUT COMPLICATIONS: ICD-10-CM

## 2017-04-02 DIAGNOSIS — R11.10 VOMITING, UNSPECIFIED: ICD-10-CM

## 2017-04-02 DIAGNOSIS — N17.9 ACUTE KIDNEY FAILURE, UNSPECIFIED: ICD-10-CM

## 2017-04-02 DIAGNOSIS — R11.2 NAUSEA WITH VOMITING, UNSPECIFIED: ICD-10-CM

## 2017-04-02 DIAGNOSIS — Z98.84 BARIATRIC SURGERY STATUS: Chronic | ICD-10-CM

## 2017-04-02 DIAGNOSIS — G47.33 OBSTRUCTIVE SLEEP APNEA (ADULT) (PEDIATRIC): ICD-10-CM

## 2017-04-02 DIAGNOSIS — N28.9 DISORDER OF KIDNEY AND URETER, UNSPECIFIED: ICD-10-CM

## 2017-04-02 DIAGNOSIS — I10 ESSENTIAL (PRIMARY) HYPERTENSION: ICD-10-CM

## 2017-04-02 DIAGNOSIS — E66.01 MORBID (SEVERE) OBESITY DUE TO EXCESS CALORIES: ICD-10-CM

## 2017-04-02 DIAGNOSIS — Z98.890 OTHER SPECIFIED POSTPROCEDURAL STATES: Chronic | ICD-10-CM

## 2017-04-02 DIAGNOSIS — Z29.9 ENCOUNTER FOR PROPHYLACTIC MEASURES, UNSPECIFIED: ICD-10-CM

## 2017-04-02 LAB
ALBUMIN SERPL ELPH-MCNC: 4.2 G/DL — SIGNIFICANT CHANGE UP (ref 3.3–5)
ALP SERPL-CCNC: 87 U/L — SIGNIFICANT CHANGE UP (ref 30–120)
ALT FLD-CCNC: 54 U/L DA — SIGNIFICANT CHANGE UP (ref 10–60)
ANION GAP SERPL CALC-SCNC: 14 MMOL/L — SIGNIFICANT CHANGE UP (ref 5–17)
APTT BLD: 24.8 SEC — LOW (ref 27.5–37.4)
AST SERPL-CCNC: 33 U/L — SIGNIFICANT CHANGE UP (ref 10–40)
BASOPHILS # BLD AUTO: 0.1 K/UL — SIGNIFICANT CHANGE UP (ref 0–0.2)
BASOPHILS NFR BLD AUTO: 1.6 % — SIGNIFICANT CHANGE UP (ref 0–2)
BILIRUB SERPL-MCNC: 0.9 MG/DL — SIGNIFICANT CHANGE UP (ref 0.2–1.2)
BUN SERPL-MCNC: 65 MG/DL — HIGH (ref 7–23)
CALCIUM SERPL-MCNC: 11 MG/DL — HIGH (ref 8.4–10.5)
CHLORIDE SERPL-SCNC: 102 MMOL/L — SIGNIFICANT CHANGE UP (ref 96–108)
CO2 SERPL-SCNC: 28 MMOL/L — SIGNIFICANT CHANGE UP (ref 22–31)
CREAT SERPL-MCNC: 3.16 MG/DL — HIGH (ref 0.5–1.3)
EOSINOPHIL # BLD AUTO: 0.1 K/UL — SIGNIFICANT CHANGE UP (ref 0–0.5)
EOSINOPHIL NFR BLD AUTO: 1.2 % — SIGNIFICANT CHANGE UP (ref 0–6)
GLUCOSE SERPL-MCNC: 124 MG/DL — HIGH (ref 70–99)
HCT VFR BLD CALC: 40.6 % — SIGNIFICANT CHANGE UP (ref 39–50)
HGB BLD-MCNC: 13.9 G/DL — SIGNIFICANT CHANGE UP (ref 13–17)
INR BLD: 1.22 RATIO — HIGH (ref 0.88–1.16)
LIDOCAIN IGE QN: 315 U/L — SIGNIFICANT CHANGE UP (ref 73–393)
LYMPHOCYTES # BLD AUTO: 0.6 K/UL — LOW (ref 1–3.3)
LYMPHOCYTES # BLD AUTO: 10 % — LOW (ref 13–44)
MCHC RBC-ENTMCNC: 29.5 PG — SIGNIFICANT CHANGE UP (ref 27–34)
MCHC RBC-ENTMCNC: 34.2 GM/DL — SIGNIFICANT CHANGE UP (ref 32–36)
MCV RBC AUTO: 86.1 FL — SIGNIFICANT CHANGE UP (ref 80–100)
MONOCYTES # BLD AUTO: 1 K/UL — HIGH (ref 0–0.9)
MONOCYTES NFR BLD AUTO: 16.2 % — HIGH (ref 2–14)
NEUTROPHILS # BLD AUTO: 4.6 K/UL — SIGNIFICANT CHANGE UP (ref 1.8–7.4)
NEUTROPHILS NFR BLD AUTO: 71.2 % — SIGNIFICANT CHANGE UP (ref 43–77)
PLATELET # BLD AUTO: 184 K/UL — SIGNIFICANT CHANGE UP (ref 150–400)
POTASSIUM SERPL-MCNC: 4.5 MMOL/L — SIGNIFICANT CHANGE UP (ref 3.5–5.3)
POTASSIUM SERPL-SCNC: 4.5 MMOL/L — SIGNIFICANT CHANGE UP (ref 3.5–5.3)
PROT SERPL-MCNC: 8.3 G/DL — SIGNIFICANT CHANGE UP (ref 6–8.3)
PROTHROM AB SERPL-ACNC: 13.3 SEC — HIGH (ref 9.8–12.7)
RBC # BLD: 4.72 M/UL — SIGNIFICANT CHANGE UP (ref 4.2–5.8)
RBC # FLD: 12.5 % — SIGNIFICANT CHANGE UP (ref 10.3–14.5)
SODIUM SERPL-SCNC: 144 MMOL/L — SIGNIFICANT CHANGE UP (ref 135–145)
WBC # BLD: 6.4 K/UL — SIGNIFICANT CHANGE UP (ref 3.8–10.5)
WBC # FLD AUTO: 6.4 K/UL — SIGNIFICANT CHANGE UP (ref 3.8–10.5)

## 2017-04-02 PROCEDURE — 74176 CT ABD & PELVIS W/O CONTRAST: CPT | Mod: 26

## 2017-04-02 PROCEDURE — 99284 EMERGENCY DEPT VISIT MOD MDM: CPT

## 2017-04-02 PROCEDURE — 93010 ELECTROCARDIOGRAM REPORT: CPT

## 2017-04-02 PROCEDURE — 99222 1ST HOSP IP/OBS MODERATE 55: CPT

## 2017-04-02 PROCEDURE — 99223 1ST HOSP IP/OBS HIGH 75: CPT | Mod: 24

## 2017-04-02 RX ORDER — METOPROLOL TARTRATE 50 MG
5 TABLET ORAL EVERY 6 HOURS
Qty: 0 | Refills: 0 | Status: DISCONTINUED | OUTPATIENT
Start: 2017-04-02 | End: 2017-04-05

## 2017-04-02 RX ORDER — DEXTROSE 50 % IN WATER 50 %
12.5 SYRINGE (ML) INTRAVENOUS ONCE
Qty: 0 | Refills: 0 | Status: DISCONTINUED | OUTPATIENT
Start: 2017-04-02 | End: 2017-04-06

## 2017-04-02 RX ORDER — GLUCAGON INJECTION, SOLUTION 0.5 MG/.1ML
1 INJECTION, SOLUTION SUBCUTANEOUS ONCE
Qty: 0 | Refills: 0 | Status: DISCONTINUED | OUTPATIENT
Start: 2017-04-02 | End: 2017-04-06

## 2017-04-02 RX ORDER — SODIUM CHLORIDE 9 MG/ML
2000 INJECTION INTRAMUSCULAR; INTRAVENOUS; SUBCUTANEOUS ONCE
Qty: 0 | Refills: 0 | Status: DISCONTINUED | OUTPATIENT
Start: 2017-04-02 | End: 2017-04-02

## 2017-04-02 RX ORDER — ONDANSETRON 8 MG/1
4 TABLET, FILM COATED ORAL EVERY 4 HOURS
Qty: 0 | Refills: 0 | Status: DISCONTINUED | OUTPATIENT
Start: 2017-04-02 | End: 2017-04-02

## 2017-04-02 RX ORDER — INSULIN LISPRO 100/ML
VIAL (ML) SUBCUTANEOUS
Qty: 0 | Refills: 0 | Status: DISCONTINUED | OUTPATIENT
Start: 2017-04-02 | End: 2017-04-06

## 2017-04-02 RX ORDER — DEXTROSE 50 % IN WATER 50 %
25 SYRINGE (ML) INTRAVENOUS ONCE
Qty: 0 | Refills: 0 | Status: DISCONTINUED | OUTPATIENT
Start: 2017-04-02 | End: 2017-04-06

## 2017-04-02 RX ORDER — METOPROLOL TARTRATE 50 MG
5 TABLET ORAL EVERY 6 HOURS
Qty: 0 | Refills: 0 | Status: DISCONTINUED | OUTPATIENT
Start: 2017-04-02 | End: 2017-04-02

## 2017-04-02 RX ORDER — INSULIN LISPRO 100/ML
VIAL (ML) SUBCUTANEOUS AT BEDTIME
Qty: 0 | Refills: 0 | Status: DISCONTINUED | OUTPATIENT
Start: 2017-04-02 | End: 2017-04-06

## 2017-04-02 RX ORDER — ONDANSETRON 8 MG/1
4 TABLET, FILM COATED ORAL ONCE
Qty: 0 | Refills: 0 | Status: COMPLETED | OUTPATIENT
Start: 2017-04-02 | End: 2017-04-02

## 2017-04-02 RX ORDER — DEXTROSE 50 % IN WATER 50 %
1 SYRINGE (ML) INTRAVENOUS ONCE
Qty: 0 | Refills: 0 | Status: DISCONTINUED | OUTPATIENT
Start: 2017-04-02 | End: 2017-04-06

## 2017-04-02 RX ORDER — SODIUM CHLORIDE 9 MG/ML
1000 INJECTION, SOLUTION INTRAVENOUS
Qty: 0 | Refills: 0 | Status: DISCONTINUED | OUTPATIENT
Start: 2017-04-02 | End: 2017-04-06

## 2017-04-02 RX ORDER — SODIUM CHLORIDE 9 MG/ML
1000 INJECTION, SOLUTION INTRAVENOUS
Qty: 0 | Refills: 0 | Status: DISCONTINUED | OUTPATIENT
Start: 2017-04-02 | End: 2017-04-03

## 2017-04-02 RX ORDER — SODIUM CHLORIDE 9 MG/ML
1000 INJECTION INTRAMUSCULAR; INTRAVENOUS; SUBCUTANEOUS ONCE
Qty: 0 | Refills: 0 | Status: COMPLETED | OUTPATIENT
Start: 2017-04-02 | End: 2017-04-02

## 2017-04-02 RX ORDER — METOCLOPRAMIDE HCL 10 MG
10 TABLET ORAL EVERY 6 HOURS
Qty: 0 | Refills: 0 | Status: DISCONTINUED | OUTPATIENT
Start: 2017-04-02 | End: 2017-04-02

## 2017-04-02 RX ORDER — METOCLOPRAMIDE HCL 10 MG
10 TABLET ORAL EVERY 6 HOURS
Qty: 0 | Refills: 0 | Status: DISCONTINUED | OUTPATIENT
Start: 2017-04-02 | End: 2017-04-03

## 2017-04-02 RX ORDER — ONDANSETRON 8 MG/1
4 TABLET, FILM COATED ORAL EVERY 4 HOURS
Qty: 0 | Refills: 0 | Status: DISCONTINUED | OUTPATIENT
Start: 2017-04-02 | End: 2017-04-06

## 2017-04-02 RX ORDER — ENOXAPARIN SODIUM 100 MG/ML
40 INJECTION SUBCUTANEOUS DAILY
Qty: 0 | Refills: 0 | Status: DISCONTINUED | OUTPATIENT
Start: 2017-04-02 | End: 2017-04-06

## 2017-04-02 RX ORDER — SODIUM CHLORIDE 9 MG/ML
1000 INJECTION, SOLUTION INTRAVENOUS
Qty: 0 | Refills: 0 | Status: DISCONTINUED | OUTPATIENT
Start: 2017-04-02 | End: 2017-04-04

## 2017-04-02 RX ADMIN — SODIUM CHLORIDE 100 MILLILITER(S): 9 INJECTION, SOLUTION INTRAVENOUS at 17:42

## 2017-04-02 RX ADMIN — SODIUM CHLORIDE 100 MILLILITER(S): 9 INJECTION, SOLUTION INTRAVENOUS at 19:06

## 2017-04-02 RX ADMIN — SODIUM CHLORIDE 1000 MILLILITER(S): 9 INJECTION INTRAMUSCULAR; INTRAVENOUS; SUBCUTANEOUS at 16:27

## 2017-04-02 RX ADMIN — ONDANSETRON 4 MILLIGRAM(S): 8 TABLET, FILM COATED ORAL at 16:28

## 2017-04-02 RX ADMIN — SODIUM CHLORIDE 100 MILLILITER(S): 9 INJECTION, SOLUTION INTRAVENOUS at 21:32

## 2017-04-02 NOTE — H&P ADULT - NSHPPHYSICALEXAM_GEN_ALL_CORE
PHYSICAL EXAM:      Constitutional: A&O x3    Eyes: Corrective lenses.  REBECA, EOMI    Neck:  Supple no masses.  NO lymphadenopathy    Back:  No CVA tenderness    Respiratory: Clear bilaterally.  No wheeze, rhonchi, rales    Cardiovascular: RRR    Gastrointestinal: obese, soft, nontender, nondistended, positive bowel sounds.  Surgical incisions remain well approximated and healing appropriately without erythema, induration, fluctuance or discharge.    Vascular: Distal pulses intact.  No edma or calf tenderness    Lymph Nodes:  No lymphadenopathy    Musculoskeletal:  Ambulating without difficulty or assistance    Psychiatric: Anxious

## 2017-04-02 NOTE — CONSULT NOTE ADULT - PROBLEM SELECTOR RECOMMENDATION 2
- since holding losartan-HCTZ, benazepril, will put in IV lopressor as PRN order (currently BP is wnl)  - hold amlodipine  - hold atenolol (will be on IV lopressor as needed)  - can add IV diltizem or hydralazine as needed as well

## 2017-04-02 NOTE — ED ADULT NURSE NOTE - OBJECTIVE STATEMENT
patient walked into ER c/o unable to keep any liquids down for 4 weeks s/p gastric sleeve 2/8/17, having nausea and vomiting

## 2017-04-02 NOTE — H&P ADULT - PSH
History of Arthroscopic Knee Surgery  right  S/P Cystoscopy  every year;  S/P cystoscopy  2003- removal of bladder tumor 2003  S/P laparoscopic sleeve gastrectomy

## 2017-04-02 NOTE — ED PROVIDER NOTE - OBJECTIVE STATEMENT
recently, including today, severe  nausea ,profuse vomiting,odynophagia with liquids and dysphagia..had sleeve gastrectomy in february.referred to er by his surgeon.had recent upper endoscopy for same c/c and found to have concretions in stomach despite being on mostly liquid diet.no fever.

## 2017-04-02 NOTE — H&P ADULT - NSHPLABSRESULTS_GEN_ALL_CORE
13.9   6.4   )-----------( 184      ( 02 Apr 2017 16:26 )             40.6   02 Apr 2017 16:26    144    |  102    |  65     ----------------------------<  124    4.5     |  28     |  3.16     Ca    11.0       02 Apr 2017 16:26    TPro  8.3    /  Alb  4.2    /  TBili  0.9    /  DBili  x      /  AST  33     /  ALT  54     /  AlkPhos  87     02 Apr 2017 16:26    02 Apr 2017 16:26    144    |  102    |  65     ----------------------------<  124    4.5     |  28     |  3.16     Ca    11.0       02 Apr 2017 16:26    TPro  8.3    /  Alb  4.2    /  TBili  0.9    /  DBili  x      /  AST  33     /  ALT  54     /  AlkPhos  87     02 Apr 2017 16:26    CT abd/pelvis with PO contrast only:  Official report pending.  No signs of obstruction.  Sleeve appears patent without bezoar or obstructing lesion.

## 2017-04-02 NOTE — PATIENT PROFILE ADULT. - NS TRANSFER PATIENT BELONGINGS
Clothing/Cell Phone/PDA (specify)/wallet ($15 in bills $3.16 in change) - and keys -- will be locked in security/Jewelry/Money (specify)

## 2017-04-02 NOTE — H&P ADULT - PROBLEM SELECTOR PLAN 3
Weight loss and management.  Continued Bariatric post operative education and reinforcement of nutritional recommendations.

## 2017-04-02 NOTE — H&P ADULT - PROBLEM SELECTOR PLAN 1
Admit for IVF resuscitation and Nutritional counseling.  Zofran/Reglan  GI follow up (Ravinder/Jennifer)  Bariatric Clear liquid diet as tolerated.

## 2017-04-02 NOTE — ED PROVIDER NOTE - MEDICAL DECISION MAKING DETAILS
surgeon requests admission for iv hydration and meds...will have pt endoscoped again. surgeon requests admission for iv hydration and meds...will have to be scoped again but renal insuff can clearly worsen n/v sx

## 2017-04-02 NOTE — H&P ADULT - FAMILY HISTORY
Father  Still living? Unknown  Family history of cerebrovascular accident (CVA), Age at diagnosis: Age Unknown  Family history of coronary artery disease, Age at diagnosis: Age Unknown

## 2017-04-02 NOTE — ED PROVIDER NOTE - CARE PLAN
Principal Discharge DX:	Intractable vomiting  Secondary Diagnosis:	Dehydration Principal Discharge DX:	Intractable vomiting  Secondary Diagnosis:	Acute renal insufficiency

## 2017-04-02 NOTE — ED ADULT TRIAGE NOTE - CHIEF COMPLAINT QUOTE
extreme nausea for 4 weeks. S/P gastrosleeve on February 8, 2017. Dr. Garcia did surgery. Vomiting as well

## 2017-04-02 NOTE — H&P ADULT - PROBLEM SELECTOR PLAN 4
Continue antihypertensives Continue antihypertensives  Hospitalist consultation to assist in management

## 2017-04-02 NOTE — H&P ADULT - HISTORY OF PRESENT ILLNESS
56 year old male, s/p Lap Sleeve Gastrectomy 2/8/2017.  Returns to ED today with complaints of nausea and vomiting x 5 since this morning.  Has had worsening dysphagia and intolerance to liquids.  Was seen recently in office as outpt and underwent VE which showed evidence of filling defect mid body of stomach suspicious for bezoar.  Subsequently underwent EGD last week which confirmed diagnosis.  Scopy traversed sleeve without suggestion of stricture or excessive narrowing.  Pt was placed on strict liquid diet but report vomiting solid matter this morning.

## 2017-04-02 NOTE — H&P ADULT - PROBLEM SELECTOR PLAN 6
Sliding scale insulin coverage. Sliding scale insulin coverage.  Hospitalist consultation to assist in management

## 2017-04-03 ENCOUNTER — RESULT REVIEW (OUTPATIENT)
Age: 57
End: 2017-04-03

## 2017-04-03 ENCOUNTER — MESSAGE (OUTPATIENT)
Age: 57
End: 2017-04-03

## 2017-04-03 DIAGNOSIS — R11.0 NAUSEA: ICD-10-CM

## 2017-04-03 LAB
ANION GAP SERPL CALC-SCNC: 9 MMOL/L — SIGNIFICANT CHANGE UP (ref 5–17)
BUN SERPL-MCNC: 61 MG/DL — HIGH (ref 7–23)
CALCIUM SERPL-MCNC: 9.8 MG/DL — SIGNIFICANT CHANGE UP (ref 8.4–10.5)
CHLORIDE SERPL-SCNC: 104 MMOL/L — SIGNIFICANT CHANGE UP (ref 96–108)
CHLORIDE UR-SCNC: 57 MMOL/L — SIGNIFICANT CHANGE UP
CO2 SERPL-SCNC: 30 MMOL/L — SIGNIFICANT CHANGE UP (ref 22–31)
CREAT ?TM UR-MCNC: 181 MG/DL — SIGNIFICANT CHANGE UP
CREAT ?TM UR-MCNC: 183 MG/DL — SIGNIFICANT CHANGE UP
CREAT SERPL-MCNC: 2.96 MG/DL — HIGH (ref 0.5–1.3)
GLUCOSE SERPL-MCNC: 93 MG/DL — SIGNIFICANT CHANGE UP (ref 70–99)
HCT VFR BLD CALC: 37.4 % — LOW (ref 39–50)
HGB BLD-MCNC: 12.2 G/DL — LOW (ref 13–17)
MCHC RBC-ENTMCNC: 28.1 PG — SIGNIFICANT CHANGE UP (ref 27–34)
MCHC RBC-ENTMCNC: 32.7 GM/DL — SIGNIFICANT CHANGE UP (ref 32–36)
MCV RBC AUTO: 86 FL — SIGNIFICANT CHANGE UP (ref 80–100)
MICROALBUMIN UR-MCNC: 4.2 MG/DL — SIGNIFICANT CHANGE UP
MICROALBUMIN/CREAT UR-RTO: 23 UG/MG — SIGNIFICANT CHANGE UP (ref 0–30)
PLATELET # BLD AUTO: 150 K/UL — SIGNIFICANT CHANGE UP (ref 150–400)
POTASSIUM SERPL-MCNC: 3.5 MMOL/L — SIGNIFICANT CHANGE UP (ref 3.5–5.3)
POTASSIUM SERPL-SCNC: 3.5 MMOL/L — SIGNIFICANT CHANGE UP (ref 3.5–5.3)
POTASSIUM UR-SCNC: 53 MMOL/L — SIGNIFICANT CHANGE UP
PROT ?TM UR-MCNC: 16 MG/DL — HIGH (ref 0–12)
PROT/CREAT UR-RTO: 0.1 RATIO — SIGNIFICANT CHANGE UP (ref 0–0.2)
RBC # BLD: 4.34 M/UL — SIGNIFICANT CHANGE UP (ref 4.2–5.8)
RBC # FLD: 12.6 % — SIGNIFICANT CHANGE UP (ref 10.3–14.5)
SODIUM SERPL-SCNC: 143 MMOL/L — SIGNIFICANT CHANGE UP (ref 135–145)
SODIUM UR-SCNC: 28 MMOL/L — SIGNIFICANT CHANGE UP
UUN UR-MCNC: 703 MG/DL — SIGNIFICANT CHANGE UP
WBC # BLD: 6.3 K/UL — SIGNIFICANT CHANGE UP (ref 3.8–10.5)
WBC # FLD AUTO: 6.3 K/UL — SIGNIFICANT CHANGE UP (ref 3.8–10.5)

## 2017-04-03 PROCEDURE — 99232 SBSQ HOSP IP/OBS MODERATE 35: CPT | Mod: 24

## 2017-04-03 PROCEDURE — 76775 US EXAM ABDO BACK WALL LIM: CPT | Mod: 26

## 2017-04-03 PROCEDURE — 99233 SBSQ HOSP IP/OBS HIGH 50: CPT

## 2017-04-03 RX ORDER — METOCLOPRAMIDE HCL 10 MG
10 TABLET ORAL EVERY 6 HOURS
Qty: 0 | Refills: 0 | Status: COMPLETED | OUTPATIENT
Start: 2017-04-03 | End: 2017-04-04

## 2017-04-03 RX ADMIN — ENOXAPARIN SODIUM 40 MILLIGRAM(S): 100 INJECTION SUBCUTANEOUS at 11:46

## 2017-04-03 RX ADMIN — Medication 10 MILLIGRAM(S): at 19:22

## 2017-04-03 RX ADMIN — Medication 10 MILLIGRAM(S): at 13:48

## 2017-04-03 NOTE — PROGRESS NOTE ADULT - PROBLEM SELECTOR PLAN 1
Nausea and vomiting seems to have subsided.  Continues on Reglan/Zofran.  Limited liquid diet today but will remain NPO tonight for EGD tomorrow.

## 2017-04-03 NOTE — CONSULT NOTE ADULT - ASSESSMENT
Post sleeve 2 months w/ intolerance to po diet  EGD w/ bezoar  abnml VES
56M with DM (no insulin), HTN, morbid obesity, hx of bladder cancer, JOSÉ who presents with intractable nausea and vomiting.  Medicine has been consulted to help with acute renal failure and co-management of DM/HTN.

## 2017-04-03 NOTE — CONSULT NOTE ADULT - PROBLEM SELECTOR RECOMMENDATION 9
post sleeve w/ bezoar in diabetic   consider gastric hypomolity   EGD to r/o structural abnml in Am   Consider reglan trial   RBIA of EGD reviewed
- likely cause of GENIE is dehydration with the combination of HTN meds that are renal toxic (such as ACEi, HCTZ, ARB).  Patient also is a diabetic and probably has some underlying nephropathy.   BUN/Cr = 19.6     - hold benazepril, losartan-HCTZ, and other nephrotoxic meds  - check urine lytes  - renal ultrasound  - continue with IVF and repeat BMP  - consider renal consult if GENIE still not resolved with fluids

## 2017-04-03 NOTE — DIETITIAN INITIAL EVALUATION ADULT. - NS AS NUTRI INTERV MEALS SNACK
Other (specify)/Bariatric clear flds for now; egd in am; progress diet per GI and surg rec; RD to f/u post egd for addtl diet edu

## 2017-04-03 NOTE — PROGRESS NOTE ADULT - PROBLEM SELECTOR PLAN 1
creatinine slightly better. likely multiple causes - ATN from hypotension, dehydration and combination of nephrotoxic medications (ACE/ARB/HCTZ).  continue aggressive IVF hydration.  check BUN/Cr daily. avoid nephrotoxic medications.

## 2017-04-03 NOTE — DIETITIAN INITIAL EVALUATION ADULT. - OTHER INFO
56M s/p gastric sleeve back in Feb 2017. Per pt, presented to Dr. Garcia's office about 1x month ago when he started to progress to pureed foods from full liquids. Pt started to have N/V- bezoar noted per tests, so pt was restarted on liquid diet. N/V continued, noted vomiting x5 yesterday. NPO status maintained, states he is feeling much better, denies N/V/abd pain. Per GI and surg, pt to advance to bariatric clear liquid diet. Pt scheduled for EGD tomorrow am to r/o structural abnormality. Briefly discussed diet progression, mnt to increase gastric emptying- RD to f/u tomorrow pending results of EGD for further diet education. Pt noted to have hx T2DM (no updated A1c since gastric surgery; DM possibly contributing to delayed gastric emptying). GENIE noted on adm, labs trending dowb. NKFA. Kosher diet preference noted.

## 2017-04-03 NOTE — CONSULT NOTE ADULT - SUBJECTIVE AND OBJECTIVE BOX
Chief Complaint:  Patient is a 56y old  Male who presents with a chief complaint of Nausea, Vomiting. (02 Apr 2017 19:33)    Allergy  Nevus comedonicus of face  Cholecystitis  Obstructive Sleep Apnea  Herniated Disc  Febrile Seizure  Morbidly Obese  Bladder Cancer  Diabetes Mellitus Type II  Hyperlipidemia  HTN (Hypertension)  S/P laparoscopic sleeve gastrectomy  S/P cystoscopy  S/P Cystoscopy  History of Arthroscopic Knee Surgery     HPI:  56 year old male, s/p Lap Sleeve Gastrectomy 2/8/2017.  Returns to ED today with complaints of nausea and vomiting x 5 since this morning.  Has had worsening dysphagia and intolerance to liquids.  Was seen recently in office as outpt and underwent VE which showed evidence of filling defect mid body of stomach suspicious for bezoar.  Subsequently underwent EGD last week which confirmed diagnosis.  Scopy traversed sleeve without suggestion of stricture or excessive narrowing.  Pt was placed on strict liquid diet but report vomiting solid matter this morning. (02 Apr 2017 19:33) Esophagram and CT reviewed  Pt comfortable. noted renal insufficieny, elevated HbA1C        penicillins (Rash)  STARCH used in bedding (Hives; Rash)      lactated ringers. 1000milliLiter(s) IV Continuous <Continuous>  dextrose 5%. 1000milliLiter(s) IV Continuous <Continuous>  dextrose Gel 1Dose(s) Oral once PRN  dextrose 50% Injectable 12.5Gram(s) IV Push once  dextrose 50% Injectable 25Gram(s) IV Push once  dextrose 50% Injectable 25Gram(s) IV Push once  glucagon  Injectable 1milliGRAM(s) IntraMuscular once PRN  enoxaparin Injectable 40milliGRAM(s) SubCutaneous daily  ondansetron Injectable 4milliGRAM(s) IV Push every 4 hours PRN  metoclopramide Injectable 10milliGRAM(s) IV Push every 6 hours PRN  insulin lispro (HumaLOG) corrective regimen sliding scale  SubCutaneous three times a day before meals  insulin lispro (HumaLOG) corrective regimen sliding scale  SubCutaneous at bedtime  metoprolol Injectable 5milliGRAM(s) IV Push every 6 hours PRN        FAMILY HISTORY:  Family history of coronary artery disease (Father)  Family history of cerebrovascular accident (CVA) (Father)        Review of Systems:    General:  No wt loss, fevers, chills, night sweats,fatigue,   Eyes:  Good vision, no reported pain  ENT:  No sore throat, pain, runny nose, dysphagia  CV:  No pain, palpitatioins, hypo/hypertension  Resp:  No dyspnea, cough, tachypnea, wheezing  GI:  No pain, No nausea, No vomiting, No diarrhea, No constipatiion, No weight loss, No fever, No pruritis, No rectal bleeding, No tarry stools, No dysphagia,  :  No pain, bleeding, incontinence, nocturia  Muscle:  No pain, weakness  Breast:  No pain, abscess, mass, discharge  Neuro:  No weakness, tingling, memory problems  Psych:  No fatigue, insomnia, mood problems, depression  Endocrine:  No polyuria, polydypsia, cold/heat intolerance  Heme:  No petechiae, ecchymosis, easy bruisability  Skin:  No rash, tattoos, scars, edema    Relevant Family History:       Relevant Social History:       Physical Exam:    Vital Signs:  Vital Signs Last 24 Hrs  T(C): 36.8, Max: 37.3 (04-02 @ 23:00)  T(F): 98.2, Max: 99.2 (04-02 @ 23:00)  HR: 56 (54 - 76)  BP: 139/67 (101/58 - 139/67)  BP(mean): --  RR: 16 (13 - 16)  SpO2: 97% (96% - 100%)  Daily Height in cm: 160.02 (02 Apr 2017 15:33)    Daily     General:  Appears stated age, well-groomed, well-nourished, no distress  HEENT:  NC/AT,  conjunctivae clear and pink, no thyromegaly, nodules, adenopathy, no JVD  Chest:  Full & symmetric excursion, no increased effort, breath sounds clear  Cardiovascular:  Regular rhythm, S1, S2, no murmur/rub/S3/S4, no abdominal bruit, no edema  Abdomen:  Soft, obese,non-tender, non-distended, normoactive bowel sounds,  no masses ,no hepatosplenomeagaly, no signs of chronic liver disease  Extremities:  no cyanosis,clubbing or edema  Skin:  No rash/erythema/ecchymoses/petechiae/wounds/abscess/warm/dry  Neuro/Psych:  Alert, oriented, no asterixis, no tremor, no encephalopathy    Laboratory:                            12.2   6.3   )-----------( 150      ( 03 Apr 2017 06:29 )             37.4     03 Apr 2017 06:29    143    |  104    |  61     ----------------------------<  93     3.5     |  30     |  2.96     Ca    9.8        03 Apr 2017 06:29    TPro  8.3    /  Alb  4.2    /  TBili  0.9    /  DBili  x      /  AST  33     /  ALT  54     /  AlkPhos  87     02 Apr 2017 16:26    LIVER FUNCTIONS - ( 02 Apr 2017 16:26 )  Alb: 4.2 g/dL / Pro: 8.3 g/dL / ALK PHOS: 87 U/L / ALT: 54 U/L DA / AST: 33 U/L / GGT: x           PT/INR - ( 02 Apr 2017 16:26 )   PT: 13.3 sec;   INR: 1.22 ratio         PTT - ( 02 Apr 2017 16:26 )  PTT:24.8 sec    Amylase Serum--  mpression:  Status post sleeve gastrectomy. Gastroduodenitis with delay in passage   through the pylorus. Irregularity at the antral pyloric region may be   related to inflammation. A stricture or lesion not excluded. Consider   endoscopic correlation. Mild gastroesophageal reflux.      Lipase [id number]       Ammonia--    Imaging:      Assessment:      Plan:
56M with DM (no insulin), HTN, morbid obesity, hx of bladder cancer, JOSÉ who presents with intractable nausea and vomiting.  Patient recently had a lap sleeve gastrectomy on 2/8/2017 and was doing well until he started his puree diet about a week after discharge.  He started to experience nausea/vomiting and intolerant to PO intake.  Would also admit to some epigastric squeezing pain while he would attempt to have PO/liquids.  Denies any diarrhea.  Patient underwent a video endoscopy that showed evidence of filing defect in the mid body of the stomach, suspicious for bezoar/food impaction.  Patient was then sent to GI where he had an EGD that did not show any suggestion of stricture or excessive narrowing.  Patient was placed on reglan by GI.  However, patient said that he started to experience burning sensation with PO intake.  He has been placed on a liquid diet but still has nausea/vomiting (including vomiting solid foods).      In the ED, patient was found to be in acute renal failure with a creatinine of 3.16 (from 1.18 on 2/9).  Patient has been taking all his usual medications and the only new medication has been reglan.  Patient denies any recent illness, fevers.  Denies any recent travel or sick contacts.      REVIEW OF SYSTEMS:  CONSTITUTIONAL: +weight loss (290lbs -> 230lbs since 12/2016), no fever  EYES: no eye pain, visual disturbances, or discharge  ENMT:  +occasional dysphagia, no difficulty hearing, tinnitus, vertigo; no sinus pain  NECK: no pain or stiffness  RESPIRATORY: no cough, wheezing, chills or hemoptysis; no shortness of breath  CARDIOVASCULAR: no chest pain, palpitations, dizziness, or leg swelling  GASTROINTESTINAL: +abdominal pain, +nausea/vomiting, no diarrhea or constipation; no melena or hematochezia.  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  WANTS TO BE POSITIONED ON SIDE WITH 2 TOWELS UNDER HEAD  Febrile Seizure: during childhood  Morbidly Obese  Bladder Cancer: since 2003, tumor removed and now reportedly in remission  Diabetes Mellitus Type II  Hyperlipidemia  HTN (Hypertension)  S/P laparoscopic sleeve gastrectomy  S/P cystoscopy: 2003- removal of bladder tumor 2003  S/P Cystoscopy: every year;  History of Arthroscopic Knee Surgery: right      HOME MEDICATIONS:    · 	omeprazole 20 mg oral delayed release capsule: 1 cap(s) orally once a day, Last Dose Taken:    · 	doxazosin 8 mg oral tablet: 1 tab(s) orally once a day, Last Dose Taken:    · 	benazepril 40 mg oral tablet: 1 tab(s) orally once a day, Last Dose Taken:    · 	metFORMIN 500 mg oral tablet: 1 tab(s) orally 2 times a day, Last Dose Taken:    · 	amLODIPine 10 mg oral tablet: 1 tab(s) orally once a day, Last Dose Taken:    · 	losartan-hydroCHLOROthiazide 50mg-12.5mg oral tablet: 1 tab(s) orally once a day, Last Dose Taken:    · 	atenolol 100 mg oral tablet: 0.5 tab(s) orally 2 times a day, Last Dose Taken:    · 	Klor-Con Sprinkle 8 mEq oral capsule, extended release: 1 cap(s) orally once a day  · 	Vitamin D3 1000 intl units oral capsule: 1 cap(s) orally once a day  · 	Vitamin B12 1000 mcg oral tablet: 1 tab(s) orally once a day  · 	Reglan 10 mg oral tablet:  orally 3 times a day, Last Dose Taken:      ALLERGIES and INTOLERANCES:  penicillins (Rash)  STARCH used in bedding (Hives; Rash)      SOCIAL HISTORY:  Denies ETOH, Tobacco, Illicit Drugs    FAMILY HISTORY:  Family history of coronary artery disease (Father)  Family history of cerebrovascular accident (CVA) (Father)      PHYSICAL EXAM:  Vital Signs Last 24 Hrs  T(C): 36.7, Max: 36.7 (04-02 @ 15:33)  T(F): 98, Max: 98.1 (04-02 @ 15:33)  HR: 54 (54 - 70)  BP: 103/79 (101/58 - 116/64)  BP(mean): --  RR: 16 (13 - 16)  SpO2: 98% (97% - 100%)    GENERAL: NAD, overweight, well-groomed, well-developed  HEAD:  atraumatic, normocephalic  EYES: EOMI, PERRLA, conjunctiva and sclera clear  ENMT: no tonsillar erythema, exudates, or enlargement; dry mucous membranes, good dentition, no lesions  NECK: supple, no JVD, normal thyroid  NERVOUS SYSTEM:  alert & oriented X3, good concentration; motor strength 5/5 B/L upper and lower extremities;   CHEST/LUNG: clear to percussion bilaterally; no rales, rhonchi, wheezing, or rubs  HEART: regular rate and rhythm; no murmurs, rubs, or gallops  ABDOMEN: soft, nontender, nondistended; hypoactive bowel sounds  EXTREMITIES:  2+ peripheral pulses, no clubbing, cyanosis, or edema  LYMPH: +varicose veins in bilateral legs  SKIN: +eczema-like rash at pre-tibial areas    LABS:                        13.9   6.4   )-----------( 184      ( 02 Apr 2017 16:26 )             40.6                         11.0<L>  11.8<H> )-----------( 206      ( 09 Feb 2017 08:37 )             33.5<L>    02 Apr 2017 16:26    144    |  102    |  65<H>  ----------------------------<  124<H>  4.5     |  28     |  3.16<H>      09 Feb 2017 08:37    136    |  102    |  24<H>  ----------------------------<  122<H>  4.3     |  29     |  1.18         Ca    11.0<H>      02 Apr 2017 16:26  Ca    9.7        09 Feb 2017 08:37    TPro  8.3    /  Alb  4.2    /  TBili  0.9    /  DBili  x      /  AST  33     /  ALT  54     /  AlkPhos  87     02 Apr 2017 16:26    LIVER FUNCTIONS - ( 02 Apr 2017 16:26 )  Alb: 4.2 g/dL / Pro: 8.3 g/dL / ALK PHOS: 87 U/L / ALT: 54 U/L DA / AST: 33 U/L / GGT: x           PT/INR - ( 02 Apr 2017 16:26 )   PT: 13.3<H>;   INR: 1.22<H>         PTT - ( 02 Apr 2017 16:26 )  PTT:24.8<L>

## 2017-04-04 ENCOUNTER — TRANSCRIPTION ENCOUNTER (OUTPATIENT)
Age: 57
End: 2017-04-04

## 2017-04-04 LAB
ANION GAP SERPL CALC-SCNC: 8 MMOL/L — SIGNIFICANT CHANGE UP (ref 5–17)
BASOPHILS # BLD AUTO: 0 K/UL — SIGNIFICANT CHANGE UP (ref 0–0.2)
BUN SERPL-MCNC: 39 MG/DL — HIGH (ref 7–23)
CALCIUM SERPL-MCNC: 9.3 MG/DL — SIGNIFICANT CHANGE UP (ref 8.4–10.5)
CHLORIDE SERPL-SCNC: 105 MMOL/L — SIGNIFICANT CHANGE UP (ref 96–108)
CO2 SERPL-SCNC: 30 MMOL/L — SIGNIFICANT CHANGE UP (ref 22–31)
CREAT SERPL-MCNC: 2.01 MG/DL — HIGH (ref 0.5–1.3)
EOSINOPHIL # BLD AUTO: 0.1 K/UL — SIGNIFICANT CHANGE UP (ref 0–0.5)
EOSINOPHIL NFR BLD AUTO: 1 % — SIGNIFICANT CHANGE UP (ref 0–6)
GLUCOSE SERPL-MCNC: 88 MG/DL — SIGNIFICANT CHANGE UP (ref 70–99)
HCT VFR BLD CALC: 33.6 % — LOW (ref 39–50)
HGB BLD-MCNC: 11.4 G/DL — LOW (ref 13–17)
LYMPHOCYTES # BLD AUTO: 0.7 K/UL — LOW (ref 1–3.3)
LYMPHOCYTES # BLD AUTO: 23 % — SIGNIFICANT CHANGE UP (ref 13–44)
MAGNESIUM SERPL-MCNC: 1.2 MG/DL — LOW (ref 1.6–2.6)
MANUAL DIF COMMENT BLD-IMP: SIGNIFICANT CHANGE UP
MANUAL SMEAR VERIFICATION: SIGNIFICANT CHANGE UP
MCHC RBC-ENTMCNC: 28.8 PG — SIGNIFICANT CHANGE UP (ref 27–34)
MCHC RBC-ENTMCNC: 34 GM/DL — SIGNIFICANT CHANGE UP (ref 32–36)
MCV RBC AUTO: 84.7 FL — SIGNIFICANT CHANGE UP (ref 80–100)
MONOCYTES # BLD AUTO: 1 K/UL — HIGH (ref 0–0.9)
MONOCYTES NFR BLD AUTO: 9 % — SIGNIFICANT CHANGE UP (ref 2–14)
NEUTROPHILS # BLD AUTO: 2.9 K/UL — SIGNIFICANT CHANGE UP (ref 1.8–7.4)
NEUTROPHILS NFR BLD AUTO: 64 % — SIGNIFICANT CHANGE UP (ref 43–77)
NEUTS BAND # BLD: 2 % — SIGNIFICANT CHANGE UP (ref 0–8)
PHOSPHATE SERPL-MCNC: 3.3 MG/DL — SIGNIFICANT CHANGE UP (ref 2.5–4.5)
PLAT MORPH BLD: NORMAL — SIGNIFICANT CHANGE UP
PLATELET # BLD AUTO: 124 K/UL — LOW (ref 150–400)
POTASSIUM SERPL-MCNC: 3.1 MMOL/L — LOW (ref 3.5–5.3)
POTASSIUM SERPL-SCNC: 3.1 MMOL/L — LOW (ref 3.5–5.3)
RBC # BLD: 3.97 M/UL — LOW (ref 4.2–5.8)
RBC # FLD: 12.6 % — SIGNIFICANT CHANGE UP (ref 10.3–14.5)
RBC BLD AUTO: NORMAL — SIGNIFICANT CHANGE UP
SODIUM SERPL-SCNC: 143 MMOL/L — SIGNIFICANT CHANGE UP (ref 135–145)
VARIANT LYMPHS # BLD: 1 % — SIGNIFICANT CHANGE UP (ref 0–6)
WBC # BLD: 4.7 K/UL — SIGNIFICANT CHANGE UP (ref 3.8–10.5)
WBC # FLD AUTO: 4.7 K/UL — SIGNIFICANT CHANGE UP (ref 3.8–10.5)

## 2017-04-04 PROCEDURE — 88305 TISSUE EXAM BY PATHOLOGIST: CPT | Mod: 26

## 2017-04-04 PROCEDURE — 99231 SBSQ HOSP IP/OBS SF/LOW 25: CPT | Mod: 24

## 2017-04-04 PROCEDURE — 99233 SBSQ HOSP IP/OBS HIGH 50: CPT

## 2017-04-04 PROCEDURE — 88312 SPECIAL STAINS GROUP 1: CPT | Mod: 26

## 2017-04-04 RX ORDER — POTASSIUM CHLORIDE 20 MEQ
10 PACKET (EA) ORAL
Qty: 0 | Refills: 0 | Status: COMPLETED | OUTPATIENT
Start: 2017-04-04 | End: 2017-04-04

## 2017-04-04 RX ORDER — DEXTROSE MONOHYDRATE, SODIUM CHLORIDE, AND POTASSIUM CHLORIDE 50; .745; 4.5 G/1000ML; G/1000ML; G/1000ML
1000 INJECTION, SOLUTION INTRAVENOUS
Qty: 0 | Refills: 0 | Status: DISCONTINUED | OUTPATIENT
Start: 2017-04-04 | End: 2017-04-06

## 2017-04-04 RX ORDER — MAGNESIUM SULFATE 500 MG/ML
2 VIAL (ML) INJECTION ONCE
Qty: 0 | Refills: 0 | Status: COMPLETED | OUTPATIENT
Start: 2017-04-04 | End: 2017-04-04

## 2017-04-04 RX ORDER — POTASSIUM CHLORIDE 20 MEQ
10 PACKET (EA) ORAL ONCE
Qty: 0 | Refills: 0 | Status: COMPLETED | OUTPATIENT
Start: 2017-04-04 | End: 2017-04-04

## 2017-04-04 RX ORDER — SODIUM CHLORIDE 9 MG/ML
1000 INJECTION, SOLUTION INTRAVENOUS
Qty: 0 | Refills: 0 | Status: DISCONTINUED | OUTPATIENT
Start: 2017-04-04 | End: 2017-04-04

## 2017-04-04 RX ADMIN — Medication 10 MILLIGRAM(S): at 01:40

## 2017-04-04 RX ADMIN — Medication 100 MILLIEQUIVALENT(S): at 17:28

## 2017-04-04 RX ADMIN — Medication 100 MILLIEQUIVALENT(S): at 11:34

## 2017-04-04 RX ADMIN — SODIUM CHLORIDE 100 MILLILITER(S): 9 INJECTION, SOLUTION INTRAVENOUS at 14:52

## 2017-04-04 RX ADMIN — DEXTROSE MONOHYDRATE, SODIUM CHLORIDE, AND POTASSIUM CHLORIDE 125 MILLILITER(S): 50; .745; 4.5 INJECTION, SOLUTION INTRAVENOUS at 23:18

## 2017-04-04 RX ADMIN — Medication 5 MILLIGRAM(S): at 23:44

## 2017-04-04 RX ADMIN — Medication 100 MILLIEQUIVALENT(S): at 10:11

## 2017-04-04 RX ADMIN — ENOXAPARIN SODIUM 40 MILLIGRAM(S): 100 INJECTION SUBCUTANEOUS at 18:00

## 2017-04-04 RX ADMIN — Medication 50 GRAM(S): at 11:15

## 2017-04-04 RX ADMIN — Medication 10 MILLIGRAM(S): at 10:22

## 2017-04-04 NOTE — CHART NOTE - NSCHARTNOTEFT_GEN_A_CORE
post egd  narrowing of gastric lumen at disal body/antrum.  able to pass gastroscope w/o resistance into antrum and duodenum.   no retained food debris.   bx for HP taken.     clear liquids   f/u pathology

## 2017-04-04 NOTE — PROGRESS NOTE ADULT - PROBLEM SELECTOR PLAN 1
creatinine improving.  likely multiple causes - ATN from hypotension, dehydration and combination of nephrotoxic medications (ACE/ARB/HCTZ).  continue aggressive IVF hydration.  check BUN/Cr daily. avoid nephrotoxic medications.  repletion for hypokalemia and hypomagnesemia ordered and in progress.

## 2017-04-04 NOTE — PROVIDER CONTACT NOTE (OTHER) - ASSESSMENT
pt denies any complaints. denies dizziness. denies feeling diaphoretic. blood sugar at dinner time 99. pt states he ate all of his dinner tonight. no insulin administered today.

## 2017-04-04 NOTE — ADVANCED PRACTICE NURSE CONSULT - ASSESSMENT
56y    Male    Patient is a 56y old  Male who presents with a chief complaint of Nausea, Vomiting. (02 Apr 2017 19:33)      HPI:  56 year old male, s/p Lap Sleeve Gastrectomy 2/8/2017.  Returns to ED today with complaints of nausea and vomiting x 5 since this morning.  Has had worsening dysphagia and intolerance to liquids.  Was seen recently in office as outpt and underwent VE which showed evidence of filling defect mid body of stomach suspicious for bezoar.  Subsequently underwent EGD last week which confirmed diagnosis.  Scopy traversed sleeve without suggestion of stricture or excessive narrowing.  Pt was placed on strict liquid diet but report vomiting solid matter this morning. 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  WANTS TO BE POSITIONED ON SIDE WITH 2 TOWELS UNDER HEAD  Febrile Seizure: during childhood  Morbidly Obese  Bladder Cancer: since 2003, tumor removed and now reportedly in remission  Diabetes Mellitus Type II  Hyperlipidemia  HTN (Hypertension)  S/P laparoscopic sleeve gastrectomy  S/P cystoscopy: 2003- removal of bladder tumor 2003  S/P Cystoscopy: every year;  History of Arthroscopic Knee Surgery: right      MEDICATIONS  (STANDING):  dextrose 5%. 1000milliLiter(s) IV Continuous <Continuous>  dextrose 50% Injectable 12.5Gram(s) IV Push once  dextrose 50% Injectable 25Gram(s) IV Push once  dextrose 50% Injectable 25Gram(s) IV Push once  enoxaparin Injectable 40milliGRAM(s) SubCutaneous daily  insulin lispro (HumaLOG) corrective regimen sliding scale  SubCutaneous three times a day before meals  insulin lispro (HumaLOG) corrective regimen sliding scale  SubCutaneous at bedtime  metoclopramide Injectable 10milliGRAM(s) IV Push every 6 hours  dextrose 5% + sodium chloride 0.45% with potassium chloride 20 mEq/L 1000milliLiter(s) IV Continuous <Continuous>  magnesium sulfate  IVPB 2Gram(s) IV Intermittent once  potassium chloride  10 mEq/100 mL IVPB 10milliEquivalent(s) IV Intermittent every 1 hour        143 mmol/L 3.1 mmol/L 105 mmol/L 30 mmol/L 39 mg/dL 2.01 mg/dL 88 mg/dL  eGRF 42 mL/min/1.73M2 36 mL/min/1.73M2   anion gap 8 mmol/L  04-04 @ 07:06           143 mmol/L 3.5 mmol/L 104 mmol/L 30 mmol/L 61 mg/dL 2.96 mg/dL 93 mg/dL  eGRF 26 mL/min/1.73M2 23 mL/min/1.73M2   anion gap 9 mmol/L  04-03 @ 06:29               Hemoglobin A1C, Whole Blood: 7.2 % (01-19 @ 20:38)      CAPILLARY BLOOD GLUCOSE  86 (04-04-17 @ 08:13)  90 (04-03-17 @ 23:25)  76 (04-03-17 @ 22:45)  77 (04-03-17 @ 21:59)  99 (04-03-17 @ 16:56)  87 (04-03-17 @ 11:39)  97 (04-03-17 @ 07:17)  89 (04-02-17 @ 22:42)

## 2017-04-04 NOTE — PROGRESS NOTE ADULT - PROBLEM SELECTOR PLAN 1
Symptoms improving.  Will get EGD today to further assess Sleeve.  r/o ulcerations or residual bezoar.

## 2017-04-05 ENCOUNTER — APPOINTMENT (OUTPATIENT)
Dept: BARIATRICS | Facility: CLINIC | Age: 57
End: 2017-04-05

## 2017-04-05 ENCOUNTER — TRANSCRIPTION ENCOUNTER (OUTPATIENT)
Age: 57
End: 2017-04-05

## 2017-04-05 DIAGNOSIS — E87.6 HYPOKALEMIA: ICD-10-CM

## 2017-04-05 LAB
ANION GAP SERPL CALC-SCNC: 7 MMOL/L — SIGNIFICANT CHANGE UP (ref 5–17)
BUN SERPL-MCNC: 22 MG/DL — SIGNIFICANT CHANGE UP (ref 7–23)
CALCIUM SERPL-MCNC: 8.7 MG/DL — SIGNIFICANT CHANGE UP (ref 8.4–10.5)
CHLORIDE SERPL-SCNC: 103 MMOL/L — SIGNIFICANT CHANGE UP (ref 96–108)
CO2 SERPL-SCNC: 30 MMOL/L — SIGNIFICANT CHANGE UP (ref 22–31)
CREAT SERPL-MCNC: 1.65 MG/DL — HIGH (ref 0.5–1.3)
GLUCOSE SERPL-MCNC: 128 MG/DL — HIGH (ref 70–99)
HCT VFR BLD CALC: 31.9 % — LOW (ref 39–50)
HGB BLD-MCNC: 11.4 G/DL — LOW (ref 13–17)
MAGNESIUM SERPL-MCNC: 1.1 MG/DL — LOW (ref 1.6–2.6)
MCHC RBC-ENTMCNC: 30.4 PG — SIGNIFICANT CHANGE UP (ref 27–34)
MCHC RBC-ENTMCNC: 35.8 GM/DL — SIGNIFICANT CHANGE UP (ref 32–36)
MCV RBC AUTO: 84.7 FL — SIGNIFICANT CHANGE UP (ref 80–100)
PHOSPHATE SERPL-MCNC: 2.5 MG/DL — SIGNIFICANT CHANGE UP (ref 2.5–4.5)
PLATELET # BLD AUTO: 108 K/UL — LOW (ref 150–400)
POTASSIUM SERPL-MCNC: 3.1 MMOL/L — LOW (ref 3.5–5.3)
POTASSIUM SERPL-SCNC: 3.1 MMOL/L — LOW (ref 3.5–5.3)
RBC # BLD: 3.76 M/UL — LOW (ref 4.2–5.8)
RBC # FLD: 12 % — SIGNIFICANT CHANGE UP (ref 10.3–14.5)
SODIUM SERPL-SCNC: 140 MMOL/L — SIGNIFICANT CHANGE UP (ref 135–145)
WBC # BLD: 4.8 K/UL — SIGNIFICANT CHANGE UP (ref 3.8–10.5)
WBC # FLD AUTO: 4.8 K/UL — SIGNIFICANT CHANGE UP (ref 3.8–10.5)

## 2017-04-05 PROCEDURE — 99233 SBSQ HOSP IP/OBS HIGH 50: CPT

## 2017-04-05 PROCEDURE — 99232 SBSQ HOSP IP/OBS MODERATE 35: CPT | Mod: 24

## 2017-04-05 RX ORDER — DOXAZOSIN MESYLATE 4 MG
1 TABLET ORAL
Qty: 0 | Refills: 0 | COMMUNITY

## 2017-04-05 RX ORDER — AMLODIPINE BESYLATE 2.5 MG/1
1 TABLET ORAL
Qty: 0 | Refills: 0 | COMMUNITY

## 2017-04-05 RX ORDER — POTASSIUM CHLORIDE 20 MEQ
1 PACKET (EA) ORAL
Qty: 30 | Refills: 0 | OUTPATIENT
Start: 2017-04-05 | End: 2017-05-05

## 2017-04-05 RX ORDER — MULTIVIT WITH MIN/MFOLATE/K2 340-15/3 G
300 POWDER (GRAM) ORAL
Qty: 9000 | Refills: 0 | OUTPATIENT
Start: 2017-04-05 | End: 2017-05-05

## 2017-04-05 RX ORDER — METFORMIN HYDROCHLORIDE 850 MG/1
1 TABLET ORAL
Qty: 0 | Refills: 0 | COMMUNITY

## 2017-04-05 RX ORDER — MAGNESIUM SULFATE 500 MG/ML
2 VIAL (ML) INJECTION ONCE
Qty: 0 | Refills: 0 | Status: COMPLETED | OUTPATIENT
Start: 2017-04-05 | End: 2017-04-05

## 2017-04-05 RX ORDER — DEXTROSE 50 % IN WATER 50 %
1 SYRINGE (ML) INTRAVENOUS
Qty: 30 | Refills: 1 | OUTPATIENT
Start: 2017-04-05

## 2017-04-05 RX ORDER — MAGNESIUM OXIDE 400 MG ORAL TABLET 241.3 MG
400 TABLET ORAL
Qty: 0 | Refills: 0 | Status: DISCONTINUED | OUTPATIENT
Start: 2017-04-05 | End: 2017-04-05

## 2017-04-05 RX ORDER — POTASSIUM CHLORIDE 20 MEQ
1 PACKET (EA) ORAL
Qty: 0 | Refills: 0 | COMMUNITY

## 2017-04-05 RX ORDER — BENAZEPRIL HYDROCHLORIDE 40 MG/1
1 TABLET ORAL
Qty: 0 | Refills: 0 | COMMUNITY

## 2017-04-05 RX ORDER — ATENOLOL 25 MG/1
50 TABLET ORAL
Qty: 0 | Refills: 0 | Status: DISCONTINUED | OUTPATIENT
Start: 2017-04-05 | End: 2017-04-06

## 2017-04-05 RX ORDER — POTASSIUM CHLORIDE 20 MEQ
20 PACKET (EA) ORAL
Qty: 0 | Refills: 0 | Status: COMPLETED | OUTPATIENT
Start: 2017-04-05 | End: 2017-04-05

## 2017-04-05 RX ADMIN — Medication 20 MILLIEQUIVALENT(S): at 11:53

## 2017-04-05 RX ADMIN — DEXTROSE MONOHYDRATE, SODIUM CHLORIDE, AND POTASSIUM CHLORIDE 125 MILLILITER(S): 50; .745; 4.5 INJECTION, SOLUTION INTRAVENOUS at 17:31

## 2017-04-05 RX ADMIN — DEXTROSE MONOHYDRATE, SODIUM CHLORIDE, AND POTASSIUM CHLORIDE 125 MILLILITER(S): 50; .745; 4.5 INJECTION, SOLUTION INTRAVENOUS at 23:16

## 2017-04-05 RX ADMIN — ATENOLOL 50 MILLIGRAM(S): 25 TABLET ORAL at 17:31

## 2017-04-05 RX ADMIN — Medication 20 MILLIEQUIVALENT(S): at 17:32

## 2017-04-05 RX ADMIN — Medication 20 MILLIEQUIVALENT(S): at 15:36

## 2017-04-05 RX ADMIN — ENOXAPARIN SODIUM 40 MILLIGRAM(S): 100 INJECTION SUBCUTANEOUS at 11:54

## 2017-04-05 RX ADMIN — Medication 50 GRAM(S): at 11:52

## 2017-04-05 NOTE — DISCHARGE NOTE ADULT - CARE PROVIDERS DIRECT ADDRESSES
[email or web address]

## 2017-04-05 NOTE — DISCHARGE NOTE ADULT - SECONDARY DIAGNOSIS.
Type 2 diabetes mellitus without complication, unspecified long term insulin use status Obstructive Sleep Apnea Essential hypertension Hypokalemia

## 2017-04-05 NOTE — DISCHARGE NOTE ADULT - HOSPITAL COURSE
Admitted with significant nausea, vomiting and food intolerance found to be markedly dehydrated with elevated creatinine. Patient was admitted for resuscitation and further evaluation. Previous outpatient video esophagram demonstrated filling defect in the mid body of the stomach. Outpatient endoscopy showed residual food matter suspicious for bezoar. Repeat endoscopy while admitted here in the hospital demonstrated no obstructive lesions. The patient was appropriately resuscitated electrolytes repleted. Diet was advanced and tolerated. He is otherwise stable for discharge home.

## 2017-04-05 NOTE — DISCHARGE NOTE ADULT - CARE PLAN
Principal Discharge DX:	Intractable vomiting with nausea, unspecified vomiting type  Secondary Diagnosis:	Type 2 diabetes mellitus without complication, unspecified long term insulin use status  Goal:	glucose 100-200  Instructions for follow-up, activity and diet:	take your FS 3x day;  bring your FS log to Dr Hammonds.  Do Not Take Metformin.   if regularly above 200 call your PMD, if below 100 drink regular juice or take a glucose tablet.  if you recurrently have symptoms with low glucose call your doctor.  Secondary Diagnosis:	Obstructive Sleep Apnea  Instructions for follow-up, activity and diet:	use your CPAP machine while sleeping  Secondary Diagnosis:	Essential hypertension  Goal:	systolic -140  Instructions for follow-up, activity and diet:	take your Atenolol only.   Check your BP.  If your top number is below 110 do not take the atenolol.  if your top number is regularly above 140 add back the cardura.  Get your blood pressure checked with your PMD.  Secondary Diagnosis:	Hypokalemia  Instructions for follow-up, activity and diet:	take the potassium and magnesium once a day.   get your levels rechecked 2-3 days before your appt with Dr Hammonds. Principal Discharge DX:	Intractable vomiting with nausea, unspecified vomiting type  Secondary Diagnosis:	Type 2 diabetes mellitus without complication, unspecified long term insulin use status  Goal:	glucose 100-200  Instructions for follow-up, activity and diet:	take your FS 3x day;  bring your FS log to Dr Hammonds.  Do Not Take Metformin.   if regularly above 200 call your PMD, if below 100 drink regular juice or take a glucose tablet.  if you recurrently have symptoms with low glucose call your doctor.  Secondary Diagnosis:	Obstructive Sleep Apnea  Instructions for follow-up, activity and diet:	use your CPAP machine while sleeping  Secondary Diagnosis:	Essential hypertension  Goal:	systolic -140  Instructions for follow-up, activity and diet:	take your Atenolol only.   Check your BP.  If your top number is below 110 do not take the atenolol.  if your top number is regularly above 140 add back the cardura.  Get your blood pressure checked with your PMD.  Secondary Diagnosis:	Hypokalemia  Instructions for follow-up, activity and diet:	take the potassium and magnesium once a day.   get your levels rechecked 2-3 days before your appt with Dr Hammonds.    The liquid magnesium can cause diarrhea - if you have loose stools stop the magnesium. Principal Discharge DX:	Intractable vomiting with nausea, unspecified vomiting type  Secondary Diagnosis:	Type 2 diabetes mellitus without complication, unspecified long term insulin use status  Goal:	glucose 100-200  Instructions for follow-up, activity and diet:	take your FS 3x day;  bring your FS log to Dr Hammonds.  Do Not Take Metformin.   if regularly above 200 call your PMD, if below 100 drink regular juice or take a glucose tablet.  if you recurrently have symptoms with low glucose call your doctor.  Secondary Diagnosis:	Obstructive Sleep Apnea  Instructions for follow-up, activity and diet:	use your CPAP machine while sleeping  Secondary Diagnosis:	Essential hypertension  Goal:	systolic -140  Instructions for follow-up, activity and diet:	take your Atenolol only.   Check your BP.  If your top number is below 110 do not take the atenolol.  if your top number is regularly above 140 add back the cardura.  Get your blood pressure checked with your PMD.  Secondary Diagnosis:	Hypokalemia  Instructions for follow-up, activity and diet:	take the potassium and magnesium once a day.   get your levels rechecked 2-3 days before your appt with Dr Hammonds.    The liquid magnesium can cause diarrhea - if you have loose stools stop the magnesium.  do not take NSAIDS (ibuprofen, motrin, advil, aleve, naprosyn) this may upset your stomach and cause more kidney damage.  you may take tylenol over the counter as directed on the bottle for pain. Principal Discharge DX:	Intractable vomiting with nausea, unspecified vomiting type  Goal:	Resolution  Instructions for follow-up, activity and diet:	Continue Bariatric Full liquid diet.  Eat/Drink slowly.  Secondary Diagnosis:	Type 2 diabetes mellitus without complication, unspecified long term insulin use status  Goal:	glucose 100-200  Instructions for follow-up, activity and diet:	take your FS 3x day;  bring your FS log to Dr Hammonds.  Do Not Take Metformin.   if regularly above 200 call your PMD, if below 100 drink regular juice or take a glucose tablet.  if you recurrently have symptoms with low glucose call your doctor.  Secondary Diagnosis:	Obstructive Sleep Apnea  Instructions for follow-up, activity and diet:	use your CPAP machine while sleeping  Secondary Diagnosis:	Essential hypertension  Goal:	systolic -140  Instructions for follow-up, activity and diet:	take your Atenolol only.   Check your BP.  If your top number is below 110 do not take the atenolol.  if your top number is regularly above 140 add back the cardura.  Get your blood pressure checked with your PMD.  Secondary Diagnosis:	Hypokalemia  Instructions for follow-up, activity and diet:	take the potassium and magnesium once a day.   get your levels rechecked 2-3 days before your appt with Dr Hammonds.    The liquid magnesium can cause diarrhea - if you have loose stools stop the magnesium.  do not take NSAIDS (ibuprofen, motrin, advil, aleve, naprosyn) this may upset your stomach and cause more kidney damage.  you may take tylenol over the counter as directed on the bottle for pain.

## 2017-04-05 NOTE — DISCHARGE NOTE ADULT - NS AS ACTIVITY OBS
No Heavy lifting/straining/Do not drive or operate machinery/Showering allowed/Walking-Indoors allowed/Walking-Outdoors allowed/Stairs allowed

## 2017-04-05 NOTE — DISCHARGE NOTE ADULT - PLAN OF CARE
glucose 100-200 take your FS 3x day;  bring your FS log to Dr Hammonds.  Do Not Take Metformin.   if regularly above 200 call your PMD, if below 100 drink regular juice or take a glucose tablet.  if you recurrently have symptoms with low glucose call your doctor. use your CPAP machine while sleeping take your Atenolol only.   Check your BP.  If your top number is below 110 do not take the atenolol.  if your top number is regularly above 140 add back the cardura.  Get your blood pressure checked with your PMD. systolic -140 take the potassium and magnesium once a day.   get your levels rechecked 2-3 days before your appt with Dr Hammonds. take the potassium and magnesium once a day.   get your levels rechecked 2-3 days before your appt with Dr Hammonds.    The liquid magnesium can cause diarrhea - if you have loose stools stop the magnesium. take the potassium and magnesium once a day.   get your levels rechecked 2-3 days before your appt with Dr Hammonds.    The liquid magnesium can cause diarrhea - if you have loose stools stop the magnesium.  do not take NSAIDS (ibuprofen, motrin, advil, aleve, naprosyn) this may upset your stomach and cause more kidney damage.  you may take tylenol over the counter as directed on the bottle for pain. Resolution Continue Bariatric Full liquid diet.  Eat/Drink slowly.

## 2017-04-05 NOTE — DISCHARGE NOTE ADULT - MEDICATION SUMMARY - MEDICATIONS TO STOP TAKING
I will STOP taking the medications listed below when I get home from the hospital:    doxazosin 8 mg oral tablet  -- 1 tab(s) by mouth once a day    benazepril 40 mg oral tablet  -- 1 tab(s) by mouth once a day    metFORMIN 500 mg oral tablet  -- 1 tab(s) by mouth 2 times a day    amLODIPine 10 mg oral tablet  -- 1 tab(s) by mouth once a day    losartan-hydroCHLOROthiazide 50mg-12.5mg oral tablet  -- 1 tab(s) by mouth once a day

## 2017-04-05 NOTE — DISCHARGE NOTE ADULT - MEDICATION SUMMARY - MEDICATIONS TO TAKE
I will START or STAY ON the medications listed below when I get home from the hospital:    freestyle test strips  -- 1 unit(s) 4 times a day (before meals and at bedtime)  -- Indication: For Type 2 diabetes mellitus without complication, unspecified long term insulin use status    blood work - Basic metabolic panel, magnesium, phosphorus  -- send results to Dr Hammonds -[phone]  -- Indication: For Intractable vomiting with nausea, unspecified vomiting type    Reglan 10 mg oral tablet  --  by mouth 3 times a day  -- Indication: For Nausea    atenolol 100 mg oral tablet  -- 0.5 tab(s) by mouth 2 times a day  -- Indication: For Essential hypertension    [facility] Glucose 4 g oral tablet, chewable  -- 1 tab(s) by mouth every 2 hours, As Needed for glucose <80.    -- Chew tablets before swallowing    -- Indication: For Type 2 diabetes mellitus without complication, unspecified long term insulin use status    magnesium citrate 1.745 g/30 mL oral liquid  -- 300 milliliter(s) by mouth once a day  -- Indication: For Morbid obesity, unspecified obesity type    potassium chloride 20 mEq oral powder for reconstitution  -- 1 each by mouth once a day  -- Dilute this medication with liquid before administration.  It is very important that you take or use this exactly as directed.  Do not skip doses or discontinue unless directed by your doctor.  Medication should be taken with plenty of water.  Take with food or milk.    -- Indication: For Hypokalemia    omeprazole 20 mg oral delayed release capsule  -- 1 cap(s) by mouth once a day  -- It is very important that you take or use this exactly as directed.  Do not skip doses or discontinue unless directed by your doctor.  Obtain medical advice before taking any non-prescription drugs as some may affect the action of this medication.  Swallow whole.  Do not crush.    -- Indication: For S/P laparoscopic sleeve gastrectomy    Vitamin D3 1000 intl units oral capsule  -- 1 cap(s) by mouth once a day  -- Indication: For S/P laparoscopic sleeve gastrectomy    Vitamin B12 1000 mcg oral tablet  -- 1 tab(s) by mouth once a day  -- Indication: For S/P laparoscopic sleeve gastrectomy

## 2017-04-05 NOTE — PROGRESS NOTE ADULT - PROBLEM SELECTOR PLAN 1
creatinine improving.  likely multiple causes - ATN from hypotension, dehydration and combination of nephrotoxic medications (ACE/ARB/HCTZ).  continue aggressive IVF hydration.  check BUN/Cr daily. avoid nephrotoxic medications.  repletion for hypokalemia and hypomagnesemia ordered and in progress.  If creatinine stable or better tomorrow will clear for discharge.  Home crushable/liquid versions ordered for patient to continue until PMD follow up.

## 2017-04-06 VITALS
DIASTOLIC BLOOD PRESSURE: 78 MMHG | TEMPERATURE: 98 F | OXYGEN SATURATION: 98 % | RESPIRATION RATE: 16 BRPM | SYSTOLIC BLOOD PRESSURE: 147 MMHG | HEART RATE: 65 BPM

## 2017-04-06 LAB
ANION GAP SERPL CALC-SCNC: 6 MMOL/L — SIGNIFICANT CHANGE UP (ref 5–17)
BUN SERPL-MCNC: 14 MG/DL — SIGNIFICANT CHANGE UP (ref 7–23)
CALCIUM SERPL-MCNC: 8.6 MG/DL — SIGNIFICANT CHANGE UP (ref 8.4–10.5)
CHLORIDE SERPL-SCNC: 104 MMOL/L — SIGNIFICANT CHANGE UP (ref 96–108)
CO2 SERPL-SCNC: 30 MMOL/L — SIGNIFICANT CHANGE UP (ref 22–31)
CREAT SERPL-MCNC: 1.54 MG/DL — HIGH (ref 0.5–1.3)
GLUCOSE SERPL-MCNC: 119 MG/DL — HIGH (ref 70–99)
HCT VFR BLD CALC: 33.3 % — LOW (ref 39–50)
HGB BLD-MCNC: 11.5 G/DL — LOW (ref 13–17)
MAGNESIUM SERPL-MCNC: 1.4 MG/DL — LOW (ref 1.6–2.6)
MCHC RBC-ENTMCNC: 29 PG — SIGNIFICANT CHANGE UP (ref 27–34)
MCHC RBC-ENTMCNC: 34.4 GM/DL — SIGNIFICANT CHANGE UP (ref 32–36)
MCV RBC AUTO: 84.3 FL — SIGNIFICANT CHANGE UP (ref 80–100)
PLATELET # BLD AUTO: 108 K/UL — LOW (ref 150–400)
POTASSIUM SERPL-MCNC: 3.8 MMOL/L — SIGNIFICANT CHANGE UP (ref 3.5–5.3)
POTASSIUM SERPL-SCNC: 3.8 MMOL/L — SIGNIFICANT CHANGE UP (ref 3.5–5.3)
RBC # BLD: 3.96 M/UL — LOW (ref 4.2–5.8)
RBC # FLD: 12.6 % — SIGNIFICANT CHANGE UP (ref 10.3–14.5)
SODIUM SERPL-SCNC: 140 MMOL/L — SIGNIFICANT CHANGE UP (ref 135–145)
WBC # BLD: 5.9 K/UL — SIGNIFICANT CHANGE UP (ref 3.8–10.5)
WBC # FLD AUTO: 5.9 K/UL — SIGNIFICANT CHANGE UP (ref 3.8–10.5)

## 2017-04-06 PROCEDURE — 82043 UR ALBUMIN QUANTITATIVE: CPT

## 2017-04-06 PROCEDURE — 85730 THROMBOPLASTIN TIME PARTIAL: CPT

## 2017-04-06 PROCEDURE — 84540 ASSAY OF URINE/UREA-N: CPT

## 2017-04-06 PROCEDURE — 88305 TISSUE EXAM BY PATHOLOGIST: CPT

## 2017-04-06 PROCEDURE — 96376 TX/PRO/DX INJ SAME DRUG ADON: CPT

## 2017-04-06 PROCEDURE — 76775 US EXAM ABDO BACK WALL LIM: CPT

## 2017-04-06 PROCEDURE — 99238 HOSP IP/OBS DSCHRG MGMT 30/<: CPT | Mod: 24

## 2017-04-06 PROCEDURE — 74176 CT ABD & PELVIS W/O CONTRAST: CPT

## 2017-04-06 PROCEDURE — 80048 BASIC METABOLIC PNL TOTAL CA: CPT

## 2017-04-06 PROCEDURE — 84300 ASSAY OF URINE SODIUM: CPT

## 2017-04-06 PROCEDURE — 84100 ASSAY OF PHOSPHORUS: CPT

## 2017-04-06 PROCEDURE — 96374 THER/PROPH/DIAG INJ IV PUSH: CPT

## 2017-04-06 PROCEDURE — 93005 ELECTROCARDIOGRAM TRACING: CPT

## 2017-04-06 PROCEDURE — 84133 ASSAY OF URINE POTASSIUM: CPT

## 2017-04-06 PROCEDURE — 83735 ASSAY OF MAGNESIUM: CPT

## 2017-04-06 PROCEDURE — 80053 COMPREHEN METABOLIC PANEL: CPT

## 2017-04-06 PROCEDURE — 85027 COMPLETE CBC AUTOMATED: CPT

## 2017-04-06 PROCEDURE — 99233 SBSQ HOSP IP/OBS HIGH 50: CPT

## 2017-04-06 PROCEDURE — 36415 COLL VENOUS BLD VENIPUNCTURE: CPT

## 2017-04-06 PROCEDURE — 82436 ASSAY OF URINE CHLORIDE: CPT

## 2017-04-06 PROCEDURE — 84156 ASSAY OF PROTEIN URINE: CPT

## 2017-04-06 PROCEDURE — 99285 EMERGENCY DEPT VISIT HI MDM: CPT | Mod: 25

## 2017-04-06 PROCEDURE — 94660 CPAP INITIATION&MGMT: CPT

## 2017-04-06 PROCEDURE — 85610 PROTHROMBIN TIME: CPT

## 2017-04-06 PROCEDURE — 83690 ASSAY OF LIPASE: CPT

## 2017-04-06 PROCEDURE — 96375 TX/PRO/DX INJ NEW DRUG ADDON: CPT

## 2017-04-06 PROCEDURE — 88312 SPECIAL STAINS GROUP 1: CPT

## 2017-04-06 RX ORDER — MAGNESIUM SULFATE 500 MG/ML
2 VIAL (ML) INJECTION ONCE
Qty: 0 | Refills: 0 | Status: COMPLETED | OUTPATIENT
Start: 2017-04-06 | End: 2017-04-06

## 2017-04-06 RX ADMIN — Medication 50 GRAM(S): at 08:15

## 2017-04-06 NOTE — PROGRESS NOTE ADULT - SUBJECTIVE AND OBJECTIVE BOX
Chief Complaint: post sleeve w/ intolerance to po diet        INTERVAL HPI/OVERNIGHT EVENTS:  none, post egd day 1      MEDICATIONS  (STANDING):  dextrose 5%. 1000milliLiter(s) IV Continuous <Continuous>  dextrose 50% Injectable 12.5Gram(s) IV Push once  dextrose 50% Injectable 25Gram(s) IV Push once  dextrose 50% Injectable 25Gram(s) IV Push once  enoxaparin Injectable 40milliGRAM(s) SubCutaneous daily  insulin lispro (HumaLOG) corrective regimen sliding scale  SubCutaneous three times a day before meals  insulin lispro (HumaLOG) corrective regimen sliding scale  SubCutaneous at bedtime  dextrose 5% + sodium chloride 0.45% with potassium chloride 20 mEq/L 1000milliLiter(s) IV Continuous <Continuous>  magnesium oxide 400milliGRAM(s) Oral three times a day with meals    MEDICATIONS  (PRN):  dextrose Gel 1Dose(s) Oral once PRN Blood Glucose LESS THAN 70 milliGRAM(s)/deciliter  glucagon  Injectable 1milliGRAM(s) IntraMuscular once PRN Glucose LESS THAN 70 milligrams/deciliter  ondansetron Injectable 4milliGRAM(s) IV Push every 4 hours PRN Nausea and/or Vomiting  metoprolol Injectable 5milliGRAM(s) IV Push every 6 hours PRN SBP > 150            Vital Signs Last 24 Hrs  T(C): 37.6, Max: 37.9 (04-04 @ 23:02)  T(F): 99.6, Max: 100.2 (04-04 @ 23:02)  HR: 73 (57 - 75)  BP: 134/84 (121/73 - 174/77)  BP(mean): --  RR: 17 (12 - 19)  SpO2: 95% (92% - 98%)    Physical exam:  abd soft, obese, non tender.           LABS:                        11.4   4.8   )-----------( 108      ( 05 Apr 2017 07:10 )             31.9     04-05    140  |  103  |  22  ----------------------------<  128<H>  3.1<L>   |  30  |  1.65<H>    Ca    8.7      05 Apr 2017 07:10  Phos  2.5     04-05  Mg     1.1     04-05            RADIOLOGY & ADDITIONAL TESTS:
POD # 1 s/p EGD.  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  No focal obstruction or foreign material.    Vital Signs Last 24 Hrs  T(C): 37.6, Max: 37.9 (04-04 @ 23:02)  T(F): 99.6, Max: 100.2 (04-04 @ 23:02)  HR: 73 (57 - 75)  BP: 134/84 (121/73 - 174/77)  BP(mean): --  RR: 17 (12 - 19)  SpO2: 95% (92% - 98%)                            11.4   4.8   )-----------( 108      ( 05 Apr 2017 07:10 )             31.9     04-05    140  |  103  |  22  ----------------------------<  128<H>  3.1<L>   |  30  |  1.65<H>    Ca    8.7      05 Apr 2017 07:10  Phos  2.5     04-05  Mg     1.1     04-05        Physical Exam:  Awake alert and oriented x3 in no acute distress. NCAT, PERRLA, EOMI  Lungs clear bilaterally without wheezes rhonchi or rales.  Regular rate and rhythm  Abdomen soft, nontender, nondistended, positive bowel sounds in all 4 quadrants. No evidence of hernia or masses. Surgical incisions remained well approximated and healing appropriately without erythema, induration or fluctuance. Extremities without edema or tenderness.
Patient admitted through the emergency room last evening with complaints of Nausea and vomiting with increasing intolerance to liquids.  On evaluation today he appears much more rested and comfortable. He denies further episodes of nausea or vomiting throughout the night. He denies any pain, fever, chills. He is voiding but reports no bowel function.    He has been seen in consultation with a gastroenterologist for planned upper endoscopy tomorrow.    Vital Signs Last 24 Hrs  T(C): 37.2, Max: 37.3 (04-02 @ 23:00)  T(F): 99, Max: 99.2 (04-02 @ 23:00)  HR: 58 (54 - 76)  BP: 145/67 (103/79 - 145/67)  BP(mean): --  RR: 16 (13 - 16)  SpO2: 96% (96% - 99%)                          12.2   6.3   )-----------( 150      ( 03 Apr 2017 06:29 )             37.4   03 Apr 2017 06:29    143    |  104    |  61     ----------------------------<  93     3.5     |  30     |  2.96     Ca    9.8        03 Apr 2017 06:29    TPro  8.3    /  Alb  4.2    /  TBili  0.9    /  DBili  x      /  AST  33     /  ALT  54     /  AlkPhos  87     02 Apr 2017 16:26    EXAM:  CT ABDOMEN AND PELVIS OC                        PROCEDURE DATE:  04/02/2017    INTERPRETATION:  CT abdomen and pelvis without contrast  History vomiting  The liver is slightly fatty. There is a line of surgical sutures along   the greater curvature of the stomach without surrounding fat stranding or   wall thickening. The gallbladder is relatively contracted. The spleen and   adrenals, pancreas and appendix are normal. Other than a small cyst on   the right, the kidneysare within normal limits. Urinary bladder is   relatively contracted. The prostate is moderately enlarged. There is no   bowel dilatation or ascites or adenopathy or focal inflammation of the   peritoneal fat. Note is made of spondylosis with moderate spinal stenosis   at L3-L4 and to a lesser degree L4-5    IMPRESSION:    No acute findings      Physical Exam:  A&O x 3.  NAD.  Usually quite anxious but appears more relaxed today.  Lungs CTA b/l.  RRR.    Abdomen obese, soft, nontender, nondistended, positive bowel sounds.   Incisions all healing well without erythema induration or fluctuance.  Extremities no edema or tenderness
Patient is a 56y old  Male who presents with a chief complaint of Nausea, Vomiting. (02 Apr 2017 19:33)      INTERVAL HPI/OVERNIGHT EVENTS: He is feeling much better overall.  no issues urinating.  no nausea/vomiting since he has been NPO. denies pain.       MEDICATIONS  (STANDING):  lactated ringers. 1000milliLiter(s) IV Continuous <Continuous>  dextrose 5%. 1000milliLiter(s) IV Continuous <Continuous>  dextrose 50% Injectable 12.5Gram(s) IV Push once  dextrose 50% Injectable 25Gram(s) IV Push once  dextrose 50% Injectable 25Gram(s) IV Push once  enoxaparin Injectable 40milliGRAM(s) SubCutaneous daily  insulin lispro (HumaLOG) corrective regimen sliding scale  SubCutaneous three times a day before meals  insulin lispro (HumaLOG) corrective regimen sliding scale  SubCutaneous at bedtime    MEDICATIONS  (PRN):  dextrose Gel 1Dose(s) Oral once PRN Blood Glucose LESS THAN 70 milliGRAM(s)/deciliter  glucagon  Injectable 1milliGRAM(s) IntraMuscular once PRN Glucose LESS THAN 70 milligrams/deciliter  ondansetron Injectable 4milliGRAM(s) IV Push every 4 hours PRN Nausea and/or Vomiting  metoclopramide Injectable 10milliGRAM(s) IV Push every 6 hours PRN nausea/vomiting  metoprolol Injectable 5milliGRAM(s) IV Push every 6 hours PRN SBP > 150      Allergies    penicillins (Rash)  STARCH used in bedding (Hives; Rash)    REVIEW OF SYSTEMS:  CONSTITUTIONAL: No fever, weight loss, or fatigue  EYES: No eye pain, visual disturbances, or discharge  ENMT:  No difficulty hearing, tinnitus, vertigo; No sinus or throat pain  NECK: No pain or stiffness  BREASTS: No pain, masses, or nipple discharge  RESPIRATORY: No cough, wheezing, chills or hemoptysis; No shortness of breath  CARDIOVASCULAR: No chest pain, palpitations, dizziness, or leg swelling  GASTROINTESTINAL: No abdominal or epigastric pain. No hematemesis; No diarrhea or constipation (last BM 2 days ago). No melena or hematochezia.  GENITOURINARY: No dysuria, frequency, hematuria, or incontinence  NEUROLOGICAL: No headaches, memory loss, loss of strength, numbness, or tremors  SKIN: No itching, burning, rashes, or lesions   LYMPH NODES: No enlarged glands  ENDOCRINE: No heat or cold intolerance; No hair loss; No polydipsia or polyuria  MUSCULOSKELETAL: No joint pain or swelling; No muscle, back, or extremity pain  PSYCHIATRIC: No depression, anxiety, mood swings, or difficulty sleeping  HEME/LYMPH: No easy bruising, or bleeding gums  ALLERGY AND IMMUNOLOGIC: No hives or eczema    Vital Signs Last 24 Hrs  T(C): 36.8, Max: 37.3 (04-02 @ 23:00)  T(F): 98.2, Max: 99.2 (04-02 @ 23:00)  HR: 56 (54 - 76)  BP: 139/67 (101/58 - 139/67)  BP(mean): --  RR: 16 (13 - 16)  SpO2: 97% (96% - 100%)    PHYSICAL EXAM:  GENERAL: NAD, well-groomed, well-developed  HEAD:  Atraumatic, Normocephalic  EYES: EOMI, PERRLA, conjunctiva and sclera clear  ENMT: No tonsillar erythema, exudates, or enlargement; Moist mucous membranes, Good dentition, No lesions  NECK: Supple, No JVD, Normal thyroid  NERVOUS SYSTEM:  Alert & Oriented X3, Good concentration; Motor Strength 5/5 B/L upper and lower extremities; CHEST/LUNG: Clear to auscultation bilaterally; No rales, rhonchi, wheezing, or rubs  HEART: Regular rate and rhythm; No murmurs, rubs, or gallops  ABDOMEN: Soft, Nontender, Nondistended; Bowel sounds present  EXTREMITIES:  2+ Peripheral Pulses, No clubbing, cyanosis, or edema  LYMPH: No lymphadenopathy noted  SKIN: No rashes or lesions    LABS:                        12.2   6.3   )-----------( 150      ( 03 Apr 2017 06:29 )             37.4     03 Apr 2017 06:29    143    |  104    |  61     ----------------------------<  93     3.5     |  30     |  2.96     Ca    9.8        03 Apr 2017 06:29    TPro  8.3    /  Alb  4.2    /  TBili  0.9    /  DBili  x      /  AST  33     /  ALT  54     /  AlkPhos  87     02 Apr 2017 16:26    PT/INR - ( 02 Apr 2017 16:26 )   PT: 13.3 sec;   INR: 1.22 ratio         PTT - ( 02 Apr 2017 16:26 )  PTT:24.8 sec        CAPILLARY BLOOD GLUCOSE  97 (03 Apr 2017 07:17)  89 (02 Apr 2017 22:42)      RADIOLOGY & ADDITIONAL TESTS:     US KIDNEY(S)  The kidneys are symmetrically normal in length without mass, calculus or   hydronephrosis. There is a small cyst on the right. Resting bladder   volume is 140 cc    Imaging Personally Reviewed:  [ ] YES     Consultant(s) Notes Reviewed:      Care Discussed with Consultants/Other Providers:
Resting comfortably this morning.  No complaints.  No additional nausea or vomiting episodes.  Awaiting EGD this morning.    Vital Signs Last 24 Hrs  T(C): 36.5, Max: 37.6 (04-03 @ 23:08)  T(F): 97.7, Max: 99.6 (04-03 @ 23:08)  HR: 62 (58 - 64)  BP: 143/71 (143/71 - 148/73)  BP(mean): --  RR: 16 (16 - 16)  SpO2: 93% (93% - 96%)                          11.4   4.7   )-----------( 124      ( 04 Apr 2017 07:06 )             33.6   04 Apr 2017 07:06    143    |  105    |  39     ----------------------------<  88     3.1     |  30     |  2.01     Ca    9.3        04 Apr 2017 07:06  Mg     1.2       04 Apr 2017 07:06    TPro  8.3    /  Alb  4.2    /  TBili  0.9    /  DBili  x      /  AST  33     /  ALT  54     /  AlkPhos  87     02 Apr 2017 16:26      Physical exam:  Awake and oriented x 3.  Anxious  Lungs CTA.  RRR  Abdomen obese, soft, nontender, nondistended.  Surgical incisions healing well.  Extremities without edema or tenderness.
S/P Sleeve Gastrectomy.  Dehydration with Acute Renal insuficiency.  Responding well to treatment.  EGD showed no evidence of Bezoar or obstruction.  Tolerating liquid diet without complaints thus far.    Vital Signs Last 24 Hrs  T(C): 36.9, Max: 37 (04-05 @ 23:10)  T(F): 98.4, Max: 98.6 (04-05 @ 23:10)  HR: 65 (56 - 65)  BP: 147/78 (132/81 - 154/76)  BP(mean): --  RR: 16 (16 - 16)  SpO2: 98% (95% - 99%)    I & Os for current day (as of 04-06 @ 13:03)  =============================================  IN: 1500 ml / OUT: 0 ml / NET: 1500 ml                            11.5   5.9   )-----------( 108      ( 06 Apr 2017 06:53 )             33.3     04-06    140  |  104  |  14  ----------------------------<  119<H>  3.8   |  30  |  1.54<H>    Ca    8.6      06 Apr 2017 06:53  Phos  2.5     04-05  Mg     1.4     04-06        Physical Exam:  Awake alert and oriented x3 in no acute distress. NCAT, PERRLA, EOMI  Lungs clear bilaterally without wheezes rhonchi or rales.  Regular rate and rhythm  Abdomen soft, nontender, nondistended, positive bowel sounds in all 4 quadrants. No evidence of hernia or masses. Surgical incisions remained well approximated and healing appropriately without erythema, induration or fluctuance. Dressings remained clean dry and intact  Extremities without edema or tenderness.
Patient is a 56y old  Male who presents with a chief complaint of Nausea, Vomiting. (05 Apr 2017 13:13)      INTERVAL HPI/OVERNIGHT EVENTS:  feeling ok, slightly upset stomach after having glucerna and first BM for several days but it is starting to settle.    MEDICATIONS  (STANDING):  dextrose 5%. 1000milliLiter(s) IV Continuous <Continuous>  dextrose 50% Injectable 12.5Gram(s) IV Push once  dextrose 50% Injectable 25Gram(s) IV Push once  dextrose 50% Injectable 25Gram(s) IV Push once  enoxaparin Injectable 40milliGRAM(s) SubCutaneous daily  insulin lispro (HumaLOG) corrective regimen sliding scale  SubCutaneous three times a day before meals  insulin lispro (HumaLOG) corrective regimen sliding scale  SubCutaneous at bedtime  ATENolol  Tablet 50milliGRAM(s) Oral two times a day    MEDICATIONS  (PRN):  dextrose Gel 1Dose(s) Oral once PRN Blood Glucose LESS THAN 70 milliGRAM(s)/deciliter  glucagon  Injectable 1milliGRAM(s) IntraMuscular once PRN Glucose LESS THAN 70 milligrams/deciliter  ondansetron Injectable 4milliGRAM(s) IV Push every 4 hours PRN Nausea and/or Vomiting      Allergies    penicillins (Rash)  STARCH used in bedding (Hives; Rash)    REVIEW OF SYSTEMS:  CONSTITUTIONAL: No fever, weight loss, or fatigue  EYES: No eye pain, visual disturbances, or discharge  ENMT:  No difficulty hearing, tinnitus, vertigo; No sinus or throat pain  NECK: No pain or stiffness  BREASTS: No pain, masses, or nipple discharge  RESPIRATORY: No cough, wheezing, chills or hemoptysis; No shortness of breath  CARDIOVASCULAR: No chest pain, palpitations, dizziness, or leg swelling  GASTROINTESTINAL: No abdominal or epigastric pain. No nausea, vomiting, or hematemesis; No diarrhea or constipation. No melena or hematochezia.  GENITOURINARY: No dysuria, frequency, hematuria, or incontinence  NEUROLOGICAL: No headaches, memory loss, loss of strength, numbness, or tremors  SKIN: No itching, burning, rashes, or lesions   LYMPH NODES: No enlarged glands  ENDOCRINE: No heat or cold intolerance; No hair loss; No polydipsia or polyuria  MUSCULOSKELETAL: No joint pain or swelling; No muscle, back, or extremity pain  PSYCHIATRIC: No depression, anxiety, mood swings, or difficulty sleeping  HEME/LYMPH: No easy bruising, or bleeding gums  ALLERGY AND IMMUNOLOGIC: No hives or eczema    Vital Signs Last 24 Hrs  T(C): 36.3, Max: 37 (04-05 @ 23:10)  T(F): 97.4, Max: 98.6 (04-05 @ 23:10)  HR: 60 (56 - 62)  BP: 154/76 (132/81 - 154/76)  BP(mean): --  RR: 16 (16 - 16)  SpO2: 95% (95% - 99%)    PHYSICAL EXAM:  GENERAL: NAD, well-groomed, well-developed  HEAD:  Atraumatic, Normocephalic  EYES: EOMI, PERRLA, conjunctiva and sclera clear  ENMT: No tonsillar erythema, exudates, or enlargement; Moist mucous membranes, Good dentition, No lesions  NECK: Supple, No JVD, Normal thyroid  NERVOUS SYSTEM:  Alert & Oriented X3, Good concentration;  moving all 4 extremities. no gross sensory losses.  CHEST/LUNG: Clear to auscultation bilaterally; No rales, rhonchi, wheezing, or rubs  HEART: Regular rate and rhythm; No murmurs, rubs, or gallops  ABDOMEN: Soft, Nontender, Nondistended; Bowel sounds present, + Obese  EXTREMITIES:  2+ Peripheral Pulses, No clubbing, cyanosis, or edema  LYMPH: No lymphadenopathy noted  SKIN: No rashes or lesions    LABS:                        11.5   5.9   )-----------( 108      ( 06 Apr 2017 06:53 )             33.3     06 Apr 2017 06:53    140    |  104    |  14     ----------------------------<  119    3.8     |  30     |  1.54     Ca    8.6        06 Apr 2017 06:53  Mg     1.4       06 Apr 2017 06:53      CAPILLARY BLOOD GLUCOSE  124 (05 Apr 2017 16:40)  124 (05 Apr 2017 12:15)      RADIOLOGY & ADDITIONAL TESTS:    Imaging Personally Reviewed:  [ ] YES     Consultant(s) Notes Reviewed:      Care Discussed with Consultants/Other Providers:
Patient is a 56y old  Male who presents with a chief complaint of Nausea, Vomiting. (02 Apr 2017 19:33)      INTERVAL HPI/OVERNIGHT EVENTS: slept well, no pain, no nausea, no vomiting.  no symptoms from glucose levels - is often in the 70s at home.     MEDICATIONS  (STANDING):  dextrose 5%. 1000milliLiter(s) IV Continuous <Continuous>  dextrose 50% Injectable 12.5Gram(s) IV Push once  dextrose 50% Injectable 25Gram(s) IV Push once  dextrose 50% Injectable 25Gram(s) IV Push once  enoxaparin Injectable 40milliGRAM(s) SubCutaneous daily  insulin lispro (HumaLOG) corrective regimen sliding scale  SubCutaneous three times a day before meals  insulin lispro (HumaLOG) corrective regimen sliding scale  SubCutaneous at bedtime  dextrose 5% + sodium chloride 0.45% with potassium chloride 20 mEq/L 1000milliLiter(s) IV Continuous <Continuous>  magnesium sulfate  IVPB 2Gram(s) IV Intermittent once  potassium chloride  10 mEq/100 mL IVPB 10milliEquivalent(s) IV Intermittent every 1 hour    MEDICATIONS  (PRN):  dextrose Gel 1Dose(s) Oral once PRN Blood Glucose LESS THAN 70 milliGRAM(s)/deciliter  glucagon  Injectable 1milliGRAM(s) IntraMuscular once PRN Glucose LESS THAN 70 milligrams/deciliter  ondansetron Injectable 4milliGRAM(s) IV Push every 4 hours PRN Nausea and/or Vomiting  metoprolol Injectable 5milliGRAM(s) IV Push every 6 hours PRN SBP > 150      Allergies    penicillins (Rash)  STARCH used in bedding (Hives; Rash)        REVIEW OF SYSTEMS:  CONSTITUTIONAL: No fever, weight loss, or fatigue  EYES: No eye pain, visual disturbances, or discharge  ENMT:  No difficulty hearing, tinnitus, vertigo; No sinus or throat pain  NECK: No pain or stiffness  BREASTS: No pain, masses, or nipple discharge  RESPIRATORY: No cough, wheezing, chills or hemoptysis; No shortness of breath  CARDIOVASCULAR: No chest pain, palpitations, dizziness, or leg swelling  GASTROINTESTINAL: No abdominal or epigastric pain. No nausea, vomiting, or hematemesis; No diarrhea or constipation. No melena or hematochezia.  GENITOURINARY: No dysuria, frequency, hematuria, or incontinence  NEUROLOGICAL: No headaches, memory loss, loss of strength, numbness, or tremors  SKIN: No itching, burning, rashes, or lesions   LYMPH NODES: No enlarged glands  ENDOCRINE: No heat or cold intolerance; No hair loss; No polydipsia or polyuria  MUSCULOSKELETAL: No joint pain or swelling; No muscle, back, or extremity pain  PSYCHIATRIC: No depression, anxiety, mood swings, or difficulty sleeping  HEME/LYMPH: No easy bruising, or bleeding gums  ALLERGY AND IMMUNOLOGIC: No hives or eczema    Vital Signs Last 24 Hrs  T(C): 36.5, Max: 37.6 (04-03 @ 23:08)  T(F): 97.7, Max: 99.6 (04-03 @ 23:08)  HR: 62 (58 - 64)  BP: 143/71 (143/71 - 148/73)  BP(mean): --  RR: 16 (16 - 16)  SpO2: 93% (93% - 96%)    PHYSICAL EXAM:  GENERAL: NAD, well-groomed, well-developed  HEAD:  Atraumatic, Normocephalic  EYES: EOMI, PERRLA, conjunctiva and sclera clear  ENMT: No tonsillar erythema, exudates, or enlargement; Moist mucous membranes, Good dentition, No lesions  NECK: Supple, No JVD, Normal thyroid  NERVOUS SYSTEM:  Alert & Oriented X3, Good concentration; Motor Strength 5/5 B/L upper and lower extremities; DTRs 2+ intact and symmetric  CHEST/LUNG: Clear to auscultation bilaterally; No rales, rhonchi, wheezing, or rubs  HEART: Regular rate and rhythm; No murmurs, rubs, or gallops  ABDOMEN: Soft, Nontender, Nondistended; Bowel sounds present  EXTREMITIES:  2+ Peripheral Pulses, No clubbing, cyanosis, or edema  LYMPH: No lymphadenopathy noted  SKIN: No rashes or lesions    LABS:                        11.4   4.7   )-----------( 124      ( 04 Apr 2017 07:06 )             33.6     04 Apr 2017 07:06    143    |  105    |  39     ----------------------------<  88     3.1     |  30     |  2.01     Ca    9.3        04 Apr 2017 07:06  Phos  3.3       04 Apr 2017 07:06  Mg     1.2       04 Apr 2017 07:06      PT/INR - ( 02 Apr 2017 16:26 )   PT: 13.3 sec;   INR: 1.22 ratio         PTT - ( 02 Apr 2017 16:26 )  PTT:24.8 sec        CAPILLARY BLOOD GLUCOSE  86 (04 Apr 2017 08:13)  90 (03 Apr 2017 23:25)  76 (03 Apr 2017 22:45)  77 (03 Apr 2017 21:59)  99 (03 Apr 2017 16:56)  87 (03 Apr 2017 11:39)      RADIOLOGY & ADDITIONAL TESTS:    Imaging Personally Reviewed:  [ ] YES     Consultant(s) Notes Reviewed:      Care Discussed with Consultants/Other Providers:
Patient is a 56y old  Male who presents with a chief complaint of Nausea, Vomiting. (05 Apr 2017 13:13)      INTERVAL HPI/OVERNIGHT EVENTS: tolerating clear diet.  no abdominal pain. ambulating w/o difficulty.     MEDICATIONS  (STANDING):  dextrose 5%. 1000milliLiter(s) IV Continuous <Continuous>  dextrose 50% Injectable 12.5Gram(s) IV Push once  dextrose 50% Injectable 25Gram(s) IV Push once  dextrose 50% Injectable 25Gram(s) IV Push once  enoxaparin Injectable 40milliGRAM(s) SubCutaneous daily  insulin lispro (HumaLOG) corrective regimen sliding scale  SubCutaneous three times a day before meals  insulin lispro (HumaLOG) corrective regimen sliding scale  SubCutaneous at bedtime  dextrose 5% + sodium chloride 0.45% with potassium chloride 20 mEq/L 1000milliLiter(s) IV Continuous <Continuous>  potassium chloride   Solution 20milliEquivalent(s) Oral every 2 hours  ATENolol  Tablet 50milliGRAM(s) Oral two times a day    MEDICATIONS  (PRN):  dextrose Gel 1Dose(s) Oral once PRN Blood Glucose LESS THAN 70 milliGRAM(s)/deciliter  glucagon  Injectable 1milliGRAM(s) IntraMuscular once PRN Glucose LESS THAN 70 milligrams/deciliter  ondansetron Injectable 4milliGRAM(s) IV Push every 4 hours PRN Nausea and/or Vomiting      Allergies    penicillins (Rash)  STARCH used in bedding (Hives; Rash)      REVIEW OF SYSTEMS:  CONSTITUTIONAL: No fever, weight loss, or fatigue  EYES: No eye pain, visual disturbances, or discharge  ENMT:  No difficulty hearing, tinnitus, vertigo; No sinus or throat pain  NECK: No pain or stiffness  BREASTS: No pain, masses, or nipple discharge  RESPIRATORY: No cough, wheezing, chills or hemoptysis; No shortness of breath  CARDIOVASCULAR: No chest pain, palpitations, dizziness, or leg swelling  GASTROINTESTINAL: No abdominal or epigastric pain. No nausea, vomiting, or hematemesis; No diarrhea or constipation. No melena or hematochezia.  GENITOURINARY: No dysuria, frequency, hematuria, or incontinence  NEUROLOGICAL: No headaches, memory loss, loss of strength, numbness, or tremors  SKIN: No itching, burning, rashes, or lesions   LYMPH NODES: No enlarged glands  ENDOCRINE: No heat or cold intolerance; No hair loss; No polydipsia or polyuria  MUSCULOSKELETAL: No joint pain or swelling; No muscle, back, or extremity pain  PSYCHIATRIC: No depression, anxiety, mood swings, or difficulty sleeping  HEME/LYMPH: No easy bruising, or bleeding gums  ALLERGY AND IMMUNOLOGIC: No hives or eczema    Vital Signs Last 24 Hrs  T(C): 37.6, Max: 37.9 (04-04 @ 23:02)  T(F): 99.6, Max: 100.2 (04-04 @ 23:02)  HR: 73 (57 - 73)  BP: 134/84 (123/79 - 174/77)  BP(mean): --  RR: 17 (15 - 19)  SpO2: 95% (92% - 98%)    PHYSICAL EXAM:  GENERAL: NAD, well-groomed, well-developed  HEAD:  Atraumatic, Normocephalic  EYES: EOMI, PERRLA, conjunctiva and sclera clear  ENMT: No tonsillar erythema, exudates, or enlargement; Moist mucous membranes, Good dentition, No lesions  NECK: Supple, No JVD,   NERVOUS SYSTEM:  Alert & Oriented X3, Good concentration; moving all extremities. no gross sensory losses.   CHEST/LUNG: Clear to auscultation bilaterally; No rales, rhonchi, wheezing, or rubs  HEART: Regular rate and rhythm; No murmurs, rubs, or gallops  ABDOMEN: Soft, Nontender, Nondistended; Bowel sounds present, + obese  EXTREMITIES:  2+ Peripheral Pulses, No clubbing, cyanosis, or edema  LYMPH: No lymphadenopathy noted  SKIN: No rashes or lesions    LABS:                        11.4   4.8   )-----------( 108      ( 05 Apr 2017 07:10 )             31.9     05 Apr 2017 07:10    140    |  103    |  22     ----------------------------<  128    3.1     |  30     |  1.65     Ca    8.7        05 Apr 2017 07:10  Phos  2.5       05 Apr 2017 07:10  Mg     1.1       05 Apr 2017 07:10              CAPILLARY BLOOD GLUCOSE  120 (05 Apr 2017 07:30)  95 (04 Apr 2017 22:22)  86 (04 Apr 2017 16:44)      RADIOLOGY & ADDITIONAL TESTS:    Imaging Personally Reviewed:  [ ] YES     Consultant(s) Notes Reviewed:      Care Discussed with Consultants/Other Providers:

## 2017-04-06 NOTE — PROGRESS NOTE ADULT - PROBLEM SELECTOR PLAN 3
FS acceptable.  C/w FS check plus Lispro sliding scale.   no Metformin with GENIE.  DC home off metformin - can be restarted by PMD if creatinine stable on follow up.
FS acceptable.  C/w FS check plus Lispro sliding scale.   no Metformin with GENIE.  considering low glucose levels will change IVF to contain dextrose.
FS acceptable.  C/w FS check plus Lispro sliding scale.   no Metformin with GENIE.  considering low glucose levels will change IVF to contain dextrose.
Cr recovering with fluid resuscitation.
Weight loss and management.  S/p Lap Sleeve Gastrectomy
Cr recovering with fluid resuscitation.
Cr recovering with fluid resuscitation.
FS acceptable.  C/w FS check plus Lispro sliding scale.   no Metformin with GENIE.

## 2017-04-06 NOTE — PROGRESS NOTE ADULT - PROBLEM SELECTOR PLAN 4
Work up and management per GI and Surgery.  Currently no symptoms while NPO.   EGD scheduled for today. As renal function is improving and after first dose of KCL there are no medical objections to anesthesia.
currently no nausea and tolerating clear diet.   ok with surgery - will add glucerna to diet.
currently no nausea and tolerating clear diet.   tolerating glucerna.
Continue glucose monitoring and coverage as needed.
Glucose control
Continue glucose monitoring and coverage as needed.
Continue glucose monitoring and coverage as needed.
Work up and management per GI and Surgery.  Currently no symptoms while NPO.   possible EGD later today.  As renal function is not worsening, EKG and other labs are ok there is no medical contraindication to anesthesia.

## 2017-04-06 NOTE — PROGRESS NOTE ADULT - PROBLEM SELECTOR PROBLEM 1
GENIE (acute kidney injury)
Intractable vomiting with nausea, unspecified vomiting type
GENIE (acute kidney injury)
Intractable vomiting with nausea, unspecified vomiting type

## 2017-04-06 NOTE — PROGRESS NOTE ADULT - PROBLEM SELECTOR PROBLEM 3
Type 2 diabetes mellitus without complication, unspecified long term insulin use status
GENIE (acute kidney injury)
GENIE (acute kidney injury)
Morbid obesity, unspecified obesity type
GENIE (acute kidney injury)
Type 2 diabetes mellitus without complication, unspecified long term insulin use status

## 2017-04-06 NOTE — PROGRESS NOTE ADULT - PROBLEM SELECTOR PLAN 1
Symptoms improving. EGD without ulcerations, obstruction or bezoar.  Diet advanced to full liquid and tolerated well.

## 2017-04-06 NOTE — PROGRESS NOTE ADULT - PROBLEM SELECTOR PROBLEM 6
Preventive measure
Hypokalemia
Essential hypertension
Hypokalemia
Preventive measure

## 2017-04-06 NOTE — PROGRESS NOTE ADULT - PROBLEM SELECTOR PLAN 2
Restart atenolol today.  continue to monitor.
Restarted atenolol.  cont to monitor.
hold all oral medications. monitor bun/cr.  monitor BP.  continue with PRN IV lopressor for now.  Will likely restart atenolol tomorrow.
Cr slightly improved with IVF's.  Continue resuscitation
Resolved
hold all oral medications. monitor bun/cr.  monitor BP.  continue with PRN IV lopressor for now.

## 2017-04-06 NOTE — PROGRESS NOTE ADULT - ASSESSMENT
56M with DM (no insulin), HTN, morbid obesity, hx of bladder cancer, JOSÉ who presents with intractable nausea and vomiting.  Medicine has been consulted to help with acute renal failure and co-management of DM/HTN.
Dysphagia, nausea and discomfort resolved.  Now tolerating Bariatric liquid diet.  Remains Hypokalemia - replacement in progress.
Nausea and vomiting resolved.  Hypokalemia resolved.  S/P Lap Sleeve Gastrectomy.  Stable for discharge home today
Nausea with vomiting s/p Lap Sleeve Gastrectomy.  Suspicion of Bezoar.
post sleeve gastrectomy 2 months    not tolerating po diet   hypomagnesemia- suppl w/ po mag  EGD w/ narrowing lumen at incisura (J shape stomach)    able to traverse w/ scope, noresidual food proximal   now tolerating liquids, advance per surgery   if unable to advance diet would then consider endoscopic dilation +/- stent  f/u path re HP

## 2017-04-06 NOTE — PROGRESS NOTE ADULT - PROBLEM SELECTOR PROBLEM 4
Intractable vomiting with nausea, unspecified vomiting type
Type 2 diabetes mellitus without complication, unspecified long term insulin use status
Intractable vomiting with nausea, unspecified vomiting type
Type 2 diabetes mellitus without complication, unspecified long term insulin use status

## 2017-04-06 NOTE — PROGRESS NOTE ADULT - PROBLEM SELECTOR PLAN 5
tolerated CPAP last night.  Encourage continued use.
tolerated CPAP last night.  Encourage use.
tolerated CPAP last night.  Encourage use.
Nocturnal CPAP
weight loss and management.  S/P Sleeve Gastrectomy.
tolerated 2 hours of CPAP last night.  Encourage use.
weight loss and management.  S/P Sleeve Gastrectomy.
weight loss and management.  S/P Sleeve Gastrectomy.

## 2017-04-06 NOTE — PROGRESS NOTE ADULT - PROBLEM SELECTOR PROBLEM 5
Obstructive Sleep Apnea
Morbid obesity, unspecified obesity type
Morbid obesity, unspecified obesity type
Obstructive Sleep Apnea
Morbid obesity, unspecified obesity type
Obstructive Sleep Apnea

## 2017-04-06 NOTE — PROGRESS NOTE ADULT - PROBLEM SELECTOR PLAN 6
c/w lovenox for DVT proph  encourage hallway ambulation  No medical objection to dc home after magnesium.
c/w lovenox for DVT proph  encourage hallway ambulation
c/w lovenox for DVT proph  encourage hallway ambulation
Continue antihypertensive medications.  Hospitalist follow-up.
Normalized.  Continue MVI supplementation.
c/w lovenox for DVT proph
K replacement both oral and IV  Once normalized may be considered for Discharge to Home.

## 2017-04-06 NOTE — PROGRESS NOTE ADULT - PROBLEM SELECTOR PLAN 1
creatinine improving.  likely multiple causes - ATN from hypotension, dehydration and combination of nephrotoxic medications (ACE/ARB/HCTZ).  can dc IVF.  Encourage oral hydration.  if intractable vomiting recurs and patient unable to hydrate he should return to ED. avoid nephrotoxic medications.  repletion for hypokalemia and hypomagnesemia ordered.  Patient should have repeat labs -prescription given to patient. creatinine improving.  likely multiple causes - ATN from hypotension, dehydration and combination of nephrotoxic medications (ACE/ARB/HCTZ).  can dc IVF.  Encourage oral hydration.  if intractable vomiting recurs and patient unable to hydrate he should return to ED. avoid nephrotoxic medications.  hypokalemia improved. repletion for hypomagnesemia ordered.  Patient should have repeat labs -prescription given to patient.

## 2017-04-06 NOTE — PROGRESS NOTE ADULT - PROBLEM SELECTOR PROBLEM 2
Essential hypertension
Nausea
GENIE (acute kidney injury)
Nausea
Essential hypertension
Nausea

## 2017-04-07 ENCOUNTER — MESSAGE (OUTPATIENT)
Age: 57
End: 2017-04-07

## 2017-04-10 ENCOUNTER — RX RENEWAL (OUTPATIENT)
Age: 57
End: 2017-04-10

## 2017-04-12 ENCOUNTER — RX RENEWAL (OUTPATIENT)
Age: 57
End: 2017-04-12

## 2017-04-19 ENCOUNTER — APPOINTMENT (OUTPATIENT)
Dept: INTERNAL MEDICINE | Facility: CLINIC | Age: 57
End: 2017-04-19

## 2017-04-19 ENCOUNTER — NON-APPOINTMENT (OUTPATIENT)
Age: 57
End: 2017-04-19

## 2017-04-19 ENCOUNTER — LABORATORY RESULT (OUTPATIENT)
Age: 57
End: 2017-04-19

## 2017-04-19 VITALS — WEIGHT: 229 LBS | HEIGHT: 61.5 IN | BODY MASS INDEX: 42.68 KG/M2

## 2017-04-19 VITALS — DIASTOLIC BLOOD PRESSURE: 70 MMHG | SYSTOLIC BLOOD PRESSURE: 120 MMHG

## 2017-04-19 LAB
ALBUMIN SERPL ELPH-MCNC: 3.6 G/DL
ALP BLD-CCNC: 78 U/L
ALT SERPL-CCNC: 24 U/L
ANION GAP SERPL CALC-SCNC: 21 MMOL/L
AST SERPL-CCNC: 18 U/L
BASOPHILS # BLD AUTO: 0.03 K/UL
BASOPHILS NFR BLD AUTO: 0.4 %
BILIRUB SERPL-MCNC: 0.5 MG/DL
BILIRUB UR QL STRIP: NORMAL
BUN SERPL-MCNC: 15 MG/DL
CALCIUM SERPL-MCNC: 10.1 MG/DL
CHLORIDE SERPL-SCNC: 100 MMOL/L
CHOLEST SERPL-MCNC: 133 MG/DL
CHOLEST/HDLC SERPL: 2.5 RATIO
CLARITY UR: CLEAR
CO2 SERPL-SCNC: 22 MMOL/L
COLLECTION METHOD: NORMAL
CREAT SERPL-MCNC: 1.37 MG/DL
EOSINOPHIL # BLD AUTO: 0.13 K/UL
EOSINOPHIL NFR BLD AUTO: 1.8 %
GLUCOSE SERPL-MCNC: 87 MG/DL
GLUCOSE UR-MCNC: NORMAL
HCG UR QL: 0.2 EU/DL
HCT VFR BLD CALC: 35.4 %
HDLC SERPL-MCNC: 54 MG/DL
HGB BLD-MCNC: 11.2 G/DL
HGB UR QL STRIP.AUTO: NORMAL
IMM GRANULOCYTES NFR BLD AUTO: 0.3 %
KETONES UR-MCNC: NORMAL
LDLC SERPL CALC-MCNC: 54 MG/DL
LEUKOCYTE ESTERASE UR QL STRIP: NORMAL
LYMPHOCYTES # BLD AUTO: 1.52 K/UL
LYMPHOCYTES NFR BLD AUTO: 21.1 %
MAN DIFF?: NORMAL
MCHC RBC-ENTMCNC: 27.9 PG
MCHC RBC-ENTMCNC: 31.6 GM/DL
MCV RBC AUTO: 88.1 FL
MONOCYTES # BLD AUTO: 0.6 K/UL
MONOCYTES NFR BLD AUTO: 8.3 %
NEUTROPHILS # BLD AUTO: 4.91 K/UL
NEUTROPHILS NFR BLD AUTO: 68.1 %
NITRITE UR QL STRIP: NORMAL
PH UR STRIP: 6
PLATELET # BLD AUTO: 301 K/UL
POTASSIUM SERPL-SCNC: 5.2 MMOL/L
PROT SERPL-MCNC: 7.2 G/DL
PROT UR STRIP-MCNC: NORMAL
RBC # BLD: 4.02 M/UL
RBC # FLD: 15.9 %
SAVE SPECIMEN: NORMAL
SODIUM SERPL-SCNC: 143 MMOL/L
SP GR UR STRIP: 1.01
T3RU NFR SERPL: 0.94 INDEX
TRIGL SERPL-MCNC: 127 MG/DL
TSH SERPL-ACNC: 1.39 UIU/ML
URATE SERPL-MCNC: 6.4 MG/DL
WBC # FLD AUTO: 7.21 K/UL

## 2017-04-19 RX ORDER — CLOTRIMAZOLE AND BETAMETHASONE DIPROPIONATE 10; .5 MG/G; MG/G
1-0.05 CREAM TOPICAL 3 TIMES DAILY
Qty: 1 | Refills: 2 | Status: DISCONTINUED | COMMUNITY
Start: 2017-02-23 | End: 2017-04-19

## 2017-04-21 LAB — 25(OH)D3 SERPL-MCNC: 49.7 NG/ML

## 2017-04-23 LAB — HBA1C MFR BLD HPLC: 5.9 %

## 2017-05-03 ENCOUNTER — APPOINTMENT (OUTPATIENT)
Dept: BARIATRICS | Facility: CLINIC | Age: 57
End: 2017-05-03

## 2017-05-03 VITALS
HEIGHT: 62 IN | SYSTOLIC BLOOD PRESSURE: 146 MMHG | WEIGHT: 223 LBS | BODY MASS INDEX: 41.04 KG/M2 | DIASTOLIC BLOOD PRESSURE: 84 MMHG

## 2017-06-08 ENCOUNTER — EMERGENCY (EMERGENCY)
Facility: HOSPITAL | Age: 57
LOS: 1 days | Discharge: HOME | End: 2017-06-08
Attending: INTERNAL MEDICINE | Admitting: INTERNAL MEDICINE
Payer: COMMERCIAL

## 2017-06-08 VITALS
HEART RATE: 72 BPM | DIASTOLIC BLOOD PRESSURE: 90 MMHG | SYSTOLIC BLOOD PRESSURE: 180 MMHG | RESPIRATION RATE: 16 BRPM | OXYGEN SATURATION: 97 %

## 2017-06-08 VITALS
OXYGEN SATURATION: 99 % | TEMPERATURE: 98 F | WEIGHT: 229.06 LBS | HEIGHT: 64 IN | SYSTOLIC BLOOD PRESSURE: 190 MMHG | HEART RATE: 77 BPM | DIASTOLIC BLOOD PRESSURE: 100 MMHG | RESPIRATION RATE: 16 BRPM

## 2017-06-08 DIAGNOSIS — Z98.890 OTHER SPECIFIED POSTPROCEDURAL STATES: Chronic | ICD-10-CM

## 2017-06-08 DIAGNOSIS — Z98.84 BARIATRIC SURGERY STATUS: Chronic | ICD-10-CM

## 2017-06-08 PROCEDURE — 99284 EMERGENCY DEPT VISIT MOD MDM: CPT

## 2017-06-08 PROCEDURE — 99283 EMERGENCY DEPT VISIT LOW MDM: CPT
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
## 2017-06-08 NOTE — ED PROVIDER NOTE - NS ED MD SCRIBE ATTENDING SCRIBE SECTIONS
VITAL SIGNS( Pullset)/PHYSICAL EXAM/HISTORY OF PRESENT ILLNESS/REVIEW OF SYSTEMS/DISPOSITION/HIV/PAST MEDICAL/SURGICAL/SOCIAL HISTORY

## 2017-06-08 NOTE — ED ADULT NURSE REASSESSMENT NOTE - NS ED NURSE REASSESS COMMENT FT1
1800 Pt requested to eat prior to discharge, pt ate well. Instructions given to pt, with good understanding. Pt discharged.
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
## 2017-06-08 NOTE — ED PROVIDER NOTE - DETAILS:
Liam Gar MD - The scribe's documentation has been prepared under my direction and personally reviewed by me in its entirety. I confirm that the note above accurately reflects all work, treatment, procedures, and medical decision making performed by me.

## 2017-06-08 NOTE — ED PROVIDER NOTE - CHPI ED SYMPTOMS NEG
numbness/tingling/no dizziness/no neck tenderness/no back pain/no loss of consciousness no loss of consciousness/no back pain/numbness/tingling/no neck tenderness/no bruising/no dizziness

## 2017-06-12 ENCOUNTER — LABORATORY RESULT (OUTPATIENT)
Age: 57
End: 2017-06-12

## 2017-06-12 ENCOUNTER — NON-APPOINTMENT (OUTPATIENT)
Age: 57
End: 2017-06-12

## 2017-06-12 ENCOUNTER — APPOINTMENT (OUTPATIENT)
Dept: INTERNAL MEDICINE | Facility: CLINIC | Age: 57
End: 2017-06-12

## 2017-06-12 VITALS — WEIGHT: 212 LBS | HEIGHT: 62 IN | BODY MASS INDEX: 39.01 KG/M2

## 2017-06-12 VITALS — SYSTOLIC BLOOD PRESSURE: 130 MMHG | DIASTOLIC BLOOD PRESSURE: 70 MMHG

## 2017-06-12 DIAGNOSIS — G47.00 INSOMNIA, UNSPECIFIED: ICD-10-CM

## 2017-06-12 LAB
BILIRUB UR QL STRIP: NORMAL
CLARITY UR: CLEAR
COLLECTION METHOD: NORMAL
GLUCOSE UR-MCNC: NORMAL
HCG UR QL: 0.2 EU/DL
HGB UR QL STRIP.AUTO: NORMAL
KETONES UR-MCNC: NORMAL
LEUKOCYTE ESTERASE UR QL STRIP: NORMAL
NITRITE UR QL STRIP: NORMAL
PH UR STRIP: 5.5
PROT UR STRIP-MCNC: NORMAL
SP GR UR STRIP: 1.02

## 2017-06-18 ENCOUNTER — CLINICAL ADVICE (OUTPATIENT)
Age: 57
End: 2017-06-18

## 2017-06-18 LAB
25(OH)D3 SERPL-MCNC: 43.6 NG/ML
ALBUMIN SERPL ELPH-MCNC: 4.2 G/DL
ALP BLD-CCNC: 64 U/L
ALT SERPL-CCNC: 10 U/L
ANION GAP SERPL CALC-SCNC: 18 MMOL/L
AST SERPL-CCNC: 14 U/L
BASOPHILS # BLD AUTO: 0.04 K/UL
BASOPHILS NFR BLD AUTO: 0.5 %
BILIRUB SERPL-MCNC: 0.3 MG/DL
BUN SERPL-MCNC: 33 MG/DL
CALCIUM SERPL-MCNC: 9.7 MG/DL
CHLORIDE SERPL-SCNC: 98 MMOL/L
CHOLEST SERPL-MCNC: 145 MG/DL
CHOLEST/HDLC SERPL: 3.7 RATIO
CO2 SERPL-SCNC: 27 MMOL/L
CREAT SERPL-MCNC: 1.5 MG/DL
EOSINOPHIL # BLD AUTO: 0.36 K/UL
EOSINOPHIL NFR BLD AUTO: 4.9 %
FERRITIN SERPL-MCNC: 240 NG/ML
GLUCOSE SERPL-MCNC: 119 MG/DL
HBA1C MFR BLD HPLC: 5 %
HCT VFR BLD CALC: 31.4 %
HDLC SERPL-MCNC: 39 MG/DL
HGB BLD-MCNC: 10.5 G/DL
IMM GRANULOCYTES NFR BLD AUTO: 0.1 %
LDLC SERPL CALC-MCNC: 73 MG/DL
LYMPHOCYTES # BLD AUTO: 0.94 K/UL
LYMPHOCYTES NFR BLD AUTO: 12.8 %
MAN DIFF?: NORMAL
MCHC RBC-ENTMCNC: 29.5 PG
MCHC RBC-ENTMCNC: 33.4 GM/DL
MCV RBC AUTO: 88.2 FL
MONOCYTES # BLD AUTO: 0.99 K/UL
MONOCYTES NFR BLD AUTO: 13.4 %
NEUTROPHILS # BLD AUTO: 5.03 K/UL
NEUTROPHILS NFR BLD AUTO: 68.3 %
PLATELET # BLD AUTO: 254 K/UL
POTASSIUM SERPL-SCNC: 3.4 MMOL/L
PROT SERPL-MCNC: 7.5 G/DL
PSA SERPL-MCNC: 0.57 NG/ML
RBC # BLD: 3.56 M/UL
RBC # FLD: 15.5 %
SAVE SPECIMEN: NORMAL
SODIUM SERPL-SCNC: 143 MMOL/L
T3RU NFR SERPL: 0.99 INDEX
T4 SERPL-MCNC: 9.2 UG/DL
TRIGL SERPL-MCNC: 164 MG/DL
TSH SERPL-ACNC: 0.4 UIU/ML
URATE SERPL-MCNC: 6.8 MG/DL
WBC # FLD AUTO: 7.37 K/UL

## 2017-06-21 ENCOUNTER — APPOINTMENT (OUTPATIENT)
Dept: INTERNAL MEDICINE | Facility: CLINIC | Age: 57
End: 2017-06-21

## 2017-06-23 ENCOUNTER — CLINICAL ADVICE (OUTPATIENT)
Age: 57
End: 2017-06-23

## 2017-06-23 LAB
BASOPHILS # BLD AUTO: 0.03 K/UL
BASOPHILS NFR BLD AUTO: 0.3 %
EOSINOPHIL # BLD AUTO: 0.4 K/UL
EOSINOPHIL NFR BLD AUTO: 3.6 %
HCT VFR BLD CALC: 30.9 %
HGB BLD-MCNC: 10.5 G/DL
IMM GRANULOCYTES NFR BLD AUTO: 0.1 %
LYMPHOCYTES # BLD AUTO: 1.28 K/UL
LYMPHOCYTES NFR BLD AUTO: 11.6 %
MAN DIFF?: NORMAL
MCHC RBC-ENTMCNC: 29.2 PG
MCHC RBC-ENTMCNC: 34 GM/DL
MCV RBC AUTO: 85.8 FL
MONOCYTES # BLD AUTO: 1.19 K/UL
MONOCYTES NFR BLD AUTO: 10.8 %
NEUTROPHILS # BLD AUTO: 8.12 K/UL
NEUTROPHILS NFR BLD AUTO: 73.6 %
PLATELET # BLD AUTO: 239 K/UL
RBC # BLD: 3.6 M/UL
RBC # FLD: 14 %
WBC # FLD AUTO: 11.03 K/UL

## 2017-07-13 ENCOUNTER — MEDICATION RENEWAL (OUTPATIENT)
Age: 57
End: 2017-07-13

## 2017-08-02 ENCOUNTER — APPOINTMENT (OUTPATIENT)
Dept: BARIATRICS | Facility: CLINIC | Age: 57
End: 2017-08-02

## 2017-08-03 ENCOUNTER — RX RENEWAL (OUTPATIENT)
Age: 57
End: 2017-08-03

## 2017-08-03 RX ORDER — TRAZODONE HYDROCHLORIDE 50 MG/1
50 TABLET ORAL
Qty: 30 | Refills: 3 | Status: DISCONTINUED | COMMUNITY
End: 2017-08-03

## 2017-08-17 ENCOUNTER — APPOINTMENT (OUTPATIENT)
Dept: BARIATRICS | Facility: CLINIC | Age: 57
End: 2017-08-17
Payer: MEDICARE

## 2017-08-17 VITALS
WEIGHT: 212 LBS | SYSTOLIC BLOOD PRESSURE: 180 MMHG | HEIGHT: 62 IN | BODY MASS INDEX: 39.01 KG/M2 | DIASTOLIC BLOOD PRESSURE: 88 MMHG

## 2017-08-17 PROCEDURE — 99214 OFFICE O/P EST MOD 30 MIN: CPT

## 2017-08-17 RX ORDER — METOCLOPRAMIDE 10 MG/1
10 TABLET ORAL 3 TIMES DAILY
Qty: 270 | Refills: 2 | Status: COMPLETED | COMMUNITY
Start: 2017-04-19 | End: 2017-08-17

## 2017-08-17 RX ORDER — POTASSIUM CHLORIDE 1.5 G/1
20 POWDER, FOR SOLUTION ORAL DAILY
Qty: 45 | Refills: 3 | Status: COMPLETED | COMMUNITY
End: 2017-08-17

## 2017-09-19 ENCOUNTER — LABORATORY RESULT (OUTPATIENT)
Age: 57
End: 2017-09-19

## 2017-09-19 ENCOUNTER — NON-APPOINTMENT (OUTPATIENT)
Age: 57
End: 2017-09-19

## 2017-09-19 ENCOUNTER — APPOINTMENT (OUTPATIENT)
Dept: INTERNAL MEDICINE | Facility: CLINIC | Age: 57
End: 2017-09-19
Payer: MEDICARE

## 2017-09-19 VITALS — DIASTOLIC BLOOD PRESSURE: 70 MMHG | SYSTOLIC BLOOD PRESSURE: 130 MMHG

## 2017-09-19 VITALS — HEIGHT: 62.5 IN | WEIGHT: 202 LBS | BODY MASS INDEX: 36.24 KG/M2

## 2017-09-19 PROCEDURE — 99214 OFFICE O/P EST MOD 30 MIN: CPT | Mod: 25

## 2017-09-19 PROCEDURE — 36415 COLL VENOUS BLD VENIPUNCTURE: CPT

## 2017-09-19 PROCEDURE — G0008: CPT

## 2017-09-19 PROCEDURE — 90686 IIV4 VACC NO PRSV 0.5 ML IM: CPT

## 2017-09-19 PROCEDURE — 93000 ELECTROCARDIOGRAM COMPLETE: CPT

## 2017-09-20 LAB
24R-OH-CALCIDIOL SERPL-MCNC: 54.4 PG/ML
25(OH)D3 SERPL-MCNC: 39.4 NG/ML
ALBUMIN SERPL ELPH-MCNC: 4.1 G/DL
ALP BLD-CCNC: 84 U/L
ALT SERPL-CCNC: 9 U/L
ANION GAP SERPL CALC-SCNC: 17 MMOL/L
APTT BLD: 27 SEC
AST SERPL-CCNC: 14 U/L
BASOPHILS # BLD AUTO: 0.03 K/UL
BASOPHILS NFR BLD AUTO: 0.4 %
BILIRUB SERPL-MCNC: 0.5 MG/DL
BUN SERPL-MCNC: 19 MG/DL
CALCIUM SERPL-MCNC: 10.1 MG/DL
CHLORIDE SERPL-SCNC: 101 MMOL/L
CHOLEST SERPL-MCNC: 130 MG/DL
CHOLEST/HDLC SERPL: 3.1 RATIO
CO2 SERPL-SCNC: 26 MMOL/L
CREAT SERPL-MCNC: 1.08 MG/DL
EOSINOPHIL # BLD AUTO: 0.37 K/UL
EOSINOPHIL NFR BLD AUTO: 4.6 %
FOLATE SERPL-MCNC: 8.6 NG/ML
GLUCOSE SERPL-MCNC: 88 MG/DL
HBA1C MFR BLD HPLC: 5.6 %
HCT VFR BLD CALC: 36.9 %
HDLC SERPL-MCNC: 42 MG/DL
HGB BLD-MCNC: 12 G/DL
IMM GRANULOCYTES NFR BLD AUTO: 0 %
INR PPP: 1.05 RATIO
IRON SATN MFR SERPL: 18 %
IRON SERPL-MCNC: 49 UG/DL
LDLC SERPL CALC-MCNC: 71 MG/DL
LYMPHOCYTES # BLD AUTO: 1.49 K/UL
LYMPHOCYTES NFR BLD AUTO: 18.4 %
MAN DIFF?: NORMAL
MCHC RBC-ENTMCNC: 29.1 PG
MCHC RBC-ENTMCNC: 32.5 GM/DL
MCV RBC AUTO: 89.6 FL
MONOCYTES # BLD AUTO: 1.23 K/UL
MONOCYTES NFR BLD AUTO: 15.2 %
NEUTROPHILS # BLD AUTO: 4.99 K/UL
NEUTROPHILS NFR BLD AUTO: 61.4 %
PLATELET # BLD AUTO: 228 K/UL
POTASSIUM SERPL-SCNC: 3.6 MMOL/L
PROT SERPL-MCNC: 7.4 G/DL
PT BLD: 11.9 SEC
RBC # BLD: 4.12 M/UL
RBC # FLD: 15.2 %
SAVE SPECIMEN: NORMAL
SODIUM SERPL-SCNC: 144 MMOL/L
T3RU NFR SERPL: 1.03 INDEX
T4 SERPL-MCNC: 8.7 UG/DL
TIBC SERPL-MCNC: 280 UG/DL
TRIGL SERPL-MCNC: 85 MG/DL
TSH SERPL-ACNC: 1.03 UIU/ML
UIBC SERPL-MCNC: 231 UG/DL
URATE SERPL-MCNC: 5.5 MG/DL
VIT B12 SERPL-MCNC: 1943 PG/ML
WBC # FLD AUTO: 8.11 K/UL

## 2017-09-22 LAB — VIT B12 USB SERPL-MCNC: 636 PG/ML

## 2017-09-23 LAB
VIT A SERPL-MCNC: 58 UG/DL
VIT B6 SERPL-MCNC: 9.1 UG/L

## 2017-09-25 LAB — VIT B2 SERPL-MCNC: 170 UG/L

## 2017-09-28 LAB — VIT B1 SERPL-MCNC: 154.6 NMOL/L

## 2017-11-29 ENCOUNTER — APPOINTMENT (OUTPATIENT)
Dept: BARIATRICS | Facility: CLINIC | Age: 57
End: 2017-11-29
Payer: MEDICARE

## 2017-11-29 VITALS
HEIGHT: 62.5 IN | SYSTOLIC BLOOD PRESSURE: 142 MMHG | HEART RATE: 57 BPM | DIASTOLIC BLOOD PRESSURE: 86 MMHG | BODY MASS INDEX: 38.04 KG/M2 | WEIGHT: 212 LBS | OXYGEN SATURATION: 96 %

## 2017-11-29 PROCEDURE — 99214 OFFICE O/P EST MOD 30 MIN: CPT

## 2017-11-29 RX ORDER — TRAZODONE HYDROCHLORIDE 50 MG/1
50 TABLET ORAL
Refills: 0 | Status: COMPLETED | COMMUNITY
End: 2017-11-29

## 2017-11-29 RX ORDER — DIPHENHYDRAMINE HCL 25 MG/1
25 TABLET ORAL
Refills: 0 | Status: COMPLETED | COMMUNITY
End: 2017-11-29

## 2017-11-29 RX ORDER — QUETIAPINE FUMARATE 25 MG/1
25 TABLET ORAL
Qty: 30 | Refills: 4 | Status: COMPLETED | COMMUNITY
Start: 2017-08-03 | End: 2017-11-29

## 2017-12-18 ENCOUNTER — APPOINTMENT (OUTPATIENT)
Dept: INTERNAL MEDICINE | Facility: CLINIC | Age: 57
End: 2017-12-18
Payer: MEDICARE

## 2017-12-18 ENCOUNTER — NON-APPOINTMENT (OUTPATIENT)
Age: 57
End: 2017-12-18

## 2017-12-18 ENCOUNTER — LABORATORY RESULT (OUTPATIENT)
Age: 57
End: 2017-12-18

## 2017-12-18 VITALS — DIASTOLIC BLOOD PRESSURE: 90 MMHG | SYSTOLIC BLOOD PRESSURE: 180 MMHG

## 2017-12-18 VITALS — HEIGHT: 62 IN | WEIGHT: 215 LBS | BODY MASS INDEX: 39.56 KG/M2

## 2017-12-18 PROCEDURE — 99214 OFFICE O/P EST MOD 30 MIN: CPT | Mod: 25

## 2017-12-18 PROCEDURE — 93000 ELECTROCARDIOGRAM COMPLETE: CPT

## 2017-12-18 PROCEDURE — 36415 COLL VENOUS BLD VENIPUNCTURE: CPT

## 2017-12-19 LAB
25(OH)D3 SERPL-MCNC: 37 NG/ML
ALBUMIN SERPL ELPH-MCNC: 3.9 G/DL
ALP BLD-CCNC: 78 U/L
ALT SERPL-CCNC: 13 U/L
ANION GAP SERPL CALC-SCNC: 14 MMOL/L
AST SERPL-CCNC: 14 U/L
BASOPHILS # BLD AUTO: 0.05 K/UL
BASOPHILS NFR BLD AUTO: 0.6 %
BILIRUB SERPL-MCNC: 0.3 MG/DL
BUN SERPL-MCNC: 19 MG/DL
CALCIUM SERPL-MCNC: 9.7 MG/DL
CHLORIDE SERPL-SCNC: 99 MMOL/L
CHOLEST SERPL-MCNC: 146 MG/DL
CHOLEST/HDLC SERPL: 3.3 RATIO
CO2 SERPL-SCNC: 29 MMOL/L
CREAT SERPL-MCNC: 0.9 MG/DL
EOSINOPHIL # BLD AUTO: 0.35 K/UL
EOSINOPHIL NFR BLD AUTO: 4.4 %
GLUCOSE SERPL-MCNC: 82 MG/DL
HBA1C MFR BLD HPLC: 5.4 %
HCT VFR BLD CALC: 39.2 %
HDLC SERPL-MCNC: 44 MG/DL
HGB BLD-MCNC: 12.9 G/DL
IMM GRANULOCYTES NFR BLD AUTO: 0.3 %
LDLC SERPL CALC-MCNC: 74 MG/DL
LYMPHOCYTES # BLD AUTO: 1.78 K/UL
LYMPHOCYTES NFR BLD AUTO: 22.6 %
MAN DIFF?: NORMAL
MCHC RBC-ENTMCNC: 28.9 PG
MCHC RBC-ENTMCNC: 32.9 GM/DL
MCV RBC AUTO: 87.7 FL
MONOCYTES # BLD AUTO: 0.63 K/UL
MONOCYTES NFR BLD AUTO: 8 %
NEUTROPHILS # BLD AUTO: 5.04 K/UL
NEUTROPHILS NFR BLD AUTO: 64.1 %
PLATELET # BLD AUTO: 230 K/UL
POTASSIUM SERPL-SCNC: 3.7 MMOL/L
PROT SERPL-MCNC: 7 G/DL
PSA SERPL-MCNC: 0.58 NG/ML
RBC # BLD: 4.47 M/UL
RBC # FLD: 14.7 %
SAVE SPECIMEN: NORMAL
SODIUM SERPL-SCNC: 142 MMOL/L
T3RU NFR SERPL: 1.08 INDEX
T4 SERPL-MCNC: 7.1 UG/DL
TRIGL SERPL-MCNC: 138 MG/DL
TSH SERPL-ACNC: 1.11 UIU/ML
URATE SERPL-MCNC: 4.3 MG/DL
WBC # FLD AUTO: 7.87 K/UL

## 2017-12-28 ENCOUNTER — LABORATORY RESULT (OUTPATIENT)
Age: 57
End: 2017-12-28

## 2017-12-28 ENCOUNTER — APPOINTMENT (OUTPATIENT)
Dept: INTERNAL MEDICINE | Facility: CLINIC | Age: 57
End: 2017-12-28
Payer: MEDICARE

## 2017-12-28 LAB
ALBUMIN SERPL ELPH-MCNC: 3.9 G/DL
ALP BLD-CCNC: 78 U/L
ALT SERPL-CCNC: 16 U/L
ANION GAP SERPL CALC-SCNC: 15 MMOL/L
APPEARANCE: CLEAR
AST SERPL-CCNC: 16 U/L
BILIRUB SERPL-MCNC: 0.5 MG/DL
BILIRUBIN URINE: NEGATIVE
BLOOD URINE: NEGATIVE
BUN SERPL-MCNC: 20 MG/DL
CALCIUM SERPL-MCNC: 9.8 MG/DL
CHLORIDE SERPL-SCNC: 99 MMOL/L
CO2 SERPL-SCNC: 28 MMOL/L
COLOR: YELLOW
CREAT SERPL-MCNC: 0.94 MG/DL
GLUCOSE QUALITATIVE U: NEGATIVE MG/DL
GLUCOSE SERPL-MCNC: 99 MG/DL
KETONES URINE: NEGATIVE
LEUKOCYTE ESTERASE URINE: ABNORMAL
NITRITE URINE: NEGATIVE
PH URINE: 5.5
POTASSIUM SERPL-SCNC: 3.6 MMOL/L
PROT SERPL-MCNC: 7 G/DL
PROTEIN URINE: 100 MG/DL
SODIUM SERPL-SCNC: 142 MMOL/L
SPECIFIC GRAVITY URINE: 1.02
UROBILINOGEN URINE: NEGATIVE MG/DL

## 2017-12-28 PROCEDURE — 36415 COLL VENOUS BLD VENIPUNCTURE: CPT

## 2018-03-08 ENCOUNTER — APPOINTMENT (OUTPATIENT)
Dept: BARIATRICS | Facility: CLINIC | Age: 58
End: 2018-03-08
Payer: MEDICARE

## 2018-03-08 VITALS
DIASTOLIC BLOOD PRESSURE: 87 MMHG | SYSTOLIC BLOOD PRESSURE: 172 MMHG | HEART RATE: 53 BPM | WEIGHT: 217.59 LBS | BODY MASS INDEX: 39.04 KG/M2 | HEIGHT: 62.5 IN | OXYGEN SATURATION: 100 %

## 2018-03-08 PROCEDURE — 99214 OFFICE O/P EST MOD 30 MIN: CPT

## 2018-03-22 ENCOUNTER — APPOINTMENT (OUTPATIENT)
Dept: INTERNAL MEDICINE | Facility: CLINIC | Age: 58
End: 2018-03-22
Payer: MEDICARE

## 2018-03-22 ENCOUNTER — LABORATORY RESULT (OUTPATIENT)
Age: 58
End: 2018-03-22

## 2018-03-22 ENCOUNTER — NON-APPOINTMENT (OUTPATIENT)
Age: 58
End: 2018-03-22

## 2018-03-22 ENCOUNTER — RX RENEWAL (OUTPATIENT)
Age: 58
End: 2018-03-22

## 2018-03-22 ENCOUNTER — CLINICAL ADVICE (OUTPATIENT)
Age: 58
End: 2018-03-22

## 2018-03-22 VITALS — BODY MASS INDEX: 35.65 KG/M2 | WEIGHT: 214 LBS | HEIGHT: 65 IN

## 2018-03-22 VITALS — SYSTOLIC BLOOD PRESSURE: 160 MMHG | DIASTOLIC BLOOD PRESSURE: 90 MMHG

## 2018-03-22 LAB
BILIRUB UR QL STRIP: NORMAL
CLARITY UR: CLEAR
COLLECTION METHOD: NORMAL
GLUCOSE UR-MCNC: NORMAL
HCG UR QL: 0.2 EU/DL
HGB UR QL STRIP.AUTO: NORMAL
KETONES UR-MCNC: NORMAL
LEUKOCYTE ESTERASE UR QL STRIP: NORMAL
NITRITE UR QL STRIP: NORMAL
PH UR STRIP: 5
PROT UR STRIP-MCNC: 100
SP GR UR STRIP: 1.02

## 2018-03-22 PROCEDURE — 93000 ELECTROCARDIOGRAM COMPLETE: CPT

## 2018-03-22 PROCEDURE — 99214 OFFICE O/P EST MOD 30 MIN: CPT | Mod: 25

## 2018-03-22 PROCEDURE — 81003 URINALYSIS AUTO W/O SCOPE: CPT | Mod: QW

## 2018-03-22 PROCEDURE — 36415 COLL VENOUS BLD VENIPUNCTURE: CPT

## 2018-03-22 NOTE — PATIENT PROFILE ADULT. - NS TRANSFER EYEGLASSES PAIRS
O2 sat 92% on nasal canula  cxr improved but still shows pulmonary congestion  ? LLL infiltrate - was seen on initial CXR/CT scan chest  will need to repeat CT scan when more stable  remains in afib  cont cefepime  DVT prophylaxis O2 sat 92% on nasal canula  cxr improved but still shows pulmonary congestion  ? LLL infiltrate - was seen on initial CXR/CT scan chest  will need to repeat CT scan when more stable  WBC remains markedly elevated - ? heme eval  remains in afib  cont cefepime  DVT prophylaxis 1 pair/to pacu

## 2018-03-23 LAB
24R-OH-CALCIDIOL SERPL-MCNC: 45.4 PG/ML
25(OH)D3 SERPL-MCNC: 42.9 NG/ML
ALBUMIN SERPL ELPH-MCNC: 4.1 G/DL
ALP BLD-CCNC: 80 U/L
ALT SERPL-CCNC: 11 U/L
ANION GAP SERPL CALC-SCNC: 13 MMOL/L
APTT BLD: 26.3 SEC
AST SERPL-CCNC: 17 U/L
BASOPHILS # BLD AUTO: 0.03 K/UL
BASOPHILS NFR BLD AUTO: 0.4 %
BILIRUB SERPL-MCNC: 0.5 MG/DL
BUN SERPL-MCNC: 16 MG/DL
CALCIUM SERPL-MCNC: 9.3 MG/DL
CHLORIDE SERPL-SCNC: 101 MMOL/L
CHOLEST SERPL-MCNC: 136 MG/DL
CHOLEST/HDLC SERPL: 3.7 RATIO
CO2 SERPL-SCNC: 28 MMOL/L
CREAT SERPL-MCNC: 0.96 MG/DL
EOSINOPHIL # BLD AUTO: 0.22 K/UL
EOSINOPHIL NFR BLD AUTO: 3.1 %
FOLATE SERPL-MCNC: >20 NG/ML
GLUCOSE SERPL-MCNC: 119 MG/DL
HBA1C MFR BLD HPLC: 5.7 %
HCT VFR BLD CALC: 39.6 %
HDLC SERPL-MCNC: 37 MG/DL
HGB BLD-MCNC: 13.5 G/DL
IMM GRANULOCYTES NFR BLD AUTO: 0.1 %
IRON SATN MFR SERPL: 30 %
IRON SERPL-MCNC: 84 UG/DL
LDLC SERPL CALC-MCNC: 75 MG/DL
LYMPHOCYTES # BLD AUTO: 1.38 K/UL
LYMPHOCYTES NFR BLD AUTO: 19.7 %
MAN DIFF?: NORMAL
MCHC RBC-ENTMCNC: 29.6 PG
MCHC RBC-ENTMCNC: 34.1 GM/DL
MCV RBC AUTO: 86.8 FL
MONOCYTES # BLD AUTO: 0.8 K/UL
MONOCYTES NFR BLD AUTO: 11.4 %
NEUTROPHILS # BLD AUTO: 4.58 K/UL
NEUTROPHILS NFR BLD AUTO: 65.3 %
PLATELET # BLD AUTO: 232 K/UL
POTASSIUM SERPL-SCNC: 3.7 MMOL/L
PROT SERPL-MCNC: 7.2 G/DL
PSA SERPL-MCNC: 0.56 NG/ML
RBC # BLD: 4.56 M/UL
RBC # FLD: 14.3 %
SAVE SPECIMEN: NORMAL
SODIUM SERPL-SCNC: 142 MMOL/L
T3RU NFR SERPL: 0.99 INDEX
T4 SERPL-MCNC: 7.9 UG/DL
TIBC SERPL-MCNC: 282 UG/DL
TRIGL SERPL-MCNC: 118 MG/DL
TSH SERPL-ACNC: 1.23 UIU/ML
UIBC SERPL-MCNC: 198 UG/DL
URATE SERPL-MCNC: 5.6 MG/DL
VIT B12 SERPL-MCNC: >2000 PG/ML
WBC # FLD AUTO: 7.02 K/UL

## 2018-03-25 LAB — VIT B2 SERPL-MCNC: 201 UG/L

## 2018-03-26 LAB
VIT A SERPL-MCNC: 79 UG/DL
VIT B6 SERPL-MCNC: 16.2 UG/L

## 2018-03-28 LAB
VIT B1 SERPL-MCNC: 141.5 NMOL/L
VIT B12 USB SERPL-MCNC: 356 PG/ML

## 2018-04-02 ENCOUNTER — APPOINTMENT (OUTPATIENT)
Dept: INTERNAL MEDICINE | Facility: CLINIC | Age: 58
End: 2018-04-02
Payer: MEDICARE

## 2018-04-02 VITALS — SYSTOLIC BLOOD PRESSURE: 130 MMHG | DIASTOLIC BLOOD PRESSURE: 70 MMHG

## 2018-04-02 DIAGNOSIS — R06.00 DYSPNEA, UNSPECIFIED: ICD-10-CM

## 2018-04-02 PROCEDURE — G0009: CPT

## 2018-04-02 PROCEDURE — 90732 PPSV23 VACC 2 YRS+ SUBQ/IM: CPT

## 2018-04-02 PROCEDURE — 99214 OFFICE O/P EST MOD 30 MIN: CPT | Mod: 25

## 2018-04-25 ENCOUNTER — MEDICATION RENEWAL (OUTPATIENT)
Age: 58
End: 2018-04-25

## 2018-06-07 ENCOUNTER — APPOINTMENT (OUTPATIENT)
Dept: INTERNAL MEDICINE | Facility: CLINIC | Age: 58
End: 2018-06-07
Payer: MEDICARE

## 2018-06-07 ENCOUNTER — LABORATORY RESULT (OUTPATIENT)
Age: 58
End: 2018-06-07

## 2018-06-07 ENCOUNTER — NON-APPOINTMENT (OUTPATIENT)
Age: 58
End: 2018-06-07

## 2018-06-07 VITALS — DIASTOLIC BLOOD PRESSURE: 80 MMHG | SYSTOLIC BLOOD PRESSURE: 130 MMHG

## 2018-06-07 VITALS — SYSTOLIC BLOOD PRESSURE: 130 MMHG | DIASTOLIC BLOOD PRESSURE: 70 MMHG

## 2018-06-07 VITALS — WEIGHT: 217 LBS | BODY MASS INDEX: 39.93 KG/M2 | HEIGHT: 62 IN

## 2018-06-07 LAB
BILIRUB UR QL STRIP: NORMAL
CLARITY UR: CLEAR
COLLECTION METHOD: NORMAL
GLUCOSE UR-MCNC: NORMAL
HCG UR QL: 0.2 EU/DL
HGB UR QL STRIP.AUTO: NORMAL
KETONES UR-MCNC: NORMAL
LEUKOCYTE ESTERASE UR QL STRIP: NORMAL
NITRITE UR QL STRIP: NORMAL
PH UR STRIP: 0.2
PROT UR STRIP-MCNC: NORMAL
SAVE SPECIMEN: NORMAL
SP GR UR STRIP: 1.02

## 2018-06-07 PROCEDURE — 99214 OFFICE O/P EST MOD 30 MIN: CPT | Mod: 25

## 2018-06-07 PROCEDURE — 81003 URINALYSIS AUTO W/O SCOPE: CPT | Mod: QW

## 2018-06-07 PROCEDURE — 36415 COLL VENOUS BLD VENIPUNCTURE: CPT

## 2018-06-07 PROCEDURE — 93000 ELECTROCARDIOGRAM COMPLETE: CPT

## 2018-06-07 NOTE — ASSESSMENT
[FreeTextEntry1] : This a 58-year-old gentleman who recently underwent a paramedic for the sleep surgery for weight loss hypercholesterolemia, hypertension which he did today for a followup visit. The patient has not lost weight since his last visit. He denies any chest pains or shortness of breath. Vital signs are stable his physical examination is positive for superficial varicosities and obesity. Bloods were drawn to check his A1c cholesterol and electrolytes.

## 2018-06-07 NOTE — PHYSICAL EXAM

## 2018-06-08 LAB
25(OH)D3 SERPL-MCNC: 45.3 NG/ML
ALBUMIN SERPL ELPH-MCNC: 3.9 G/DL
ALP BLD-CCNC: 70 U/L
ALT SERPL-CCNC: 15 U/L
ANION GAP SERPL CALC-SCNC: 15 MMOL/L
AST SERPL-CCNC: 17 U/L
BASOPHILS # BLD AUTO: 0.04 K/UL
BASOPHILS NFR BLD AUTO: 0.5 %
BILIRUB SERPL-MCNC: 0.7 MG/DL
BUN SERPL-MCNC: 23 MG/DL
CALCIUM SERPL-MCNC: 10 MG/DL
CHLORIDE SERPL-SCNC: 97 MMOL/L
CHOLEST SERPL-MCNC: 129 MG/DL
CHOLEST/HDLC SERPL: 2.9 RATIO
CO2 SERPL-SCNC: 27 MMOL/L
CREAT SERPL-MCNC: 1.05 MG/DL
EOSINOPHIL # BLD AUTO: 0.23 K/UL
EOSINOPHIL NFR BLD AUTO: 2.8 %
GLUCOSE SERPL-MCNC: 99 MG/DL
HBA1C MFR BLD HPLC: 5.7 %
HCT VFR BLD CALC: 35.7 %
HDLC SERPL-MCNC: 45 MG/DL
HGB BLD-MCNC: 12.3 G/DL
IMM GRANULOCYTES NFR BLD AUTO: 0.1 %
LDLC SERPL CALC-MCNC: 67 MG/DL
LYMPHOCYTES # BLD AUTO: 1.77 K/UL
LYMPHOCYTES NFR BLD AUTO: 21.7 %
MAN DIFF?: NORMAL
MCHC RBC-ENTMCNC: 29.9 PG
MCHC RBC-ENTMCNC: 34.5 GM/DL
MCV RBC AUTO: 86.7 FL
MONOCYTES # BLD AUTO: 1.04 K/UL
MONOCYTES NFR BLD AUTO: 12.7 %
NEUTROPHILS # BLD AUTO: 5.08 K/UL
NEUTROPHILS NFR BLD AUTO: 62.2 %
PLATELET # BLD AUTO: 207 K/UL
POTASSIUM SERPL-SCNC: 3.9 MMOL/L
PROT SERPL-MCNC: 7.3 G/DL
RBC # BLD: 4.12 M/UL
RBC # FLD: 14.9 %
SODIUM SERPL-SCNC: 139 MMOL/L
T3RU NFR SERPL: 1.04 INDEX
T4 SERPL-MCNC: 8.2 UG/DL
TRIGL SERPL-MCNC: 84 MG/DL
TSH SERPL-ACNC: 1.2 UIU/ML
URATE SERPL-MCNC: 5.5 MG/DL
WBC # FLD AUTO: 8.17 K/UL

## 2018-06-14 ENCOUNTER — APPOINTMENT (OUTPATIENT)
Dept: BARIATRICS | Facility: CLINIC | Age: 58
End: 2018-06-14
Payer: MEDICARE

## 2018-06-14 VITALS
SYSTOLIC BLOOD PRESSURE: 136 MMHG | BODY MASS INDEX: 39.93 KG/M2 | OXYGEN SATURATION: 97 % | HEART RATE: 56 BPM | DIASTOLIC BLOOD PRESSURE: 86 MMHG | HEIGHT: 62 IN | WEIGHT: 217 LBS

## 2018-06-14 DIAGNOSIS — Z87.898 PERSONAL HISTORY OF OTHER SPECIFIED CONDITIONS: ICD-10-CM

## 2018-06-14 PROCEDURE — 99214 OFFICE O/P EST MOD 30 MIN: CPT

## 2018-06-14 RX ORDER — FUROSEMIDE 20 MG/1
20 TABLET ORAL
Qty: 90 | Refills: 3 | Status: DISCONTINUED | COMMUNITY
Start: 2017-12-18 | End: 2018-06-14

## 2018-06-14 RX ORDER — FERROUS GLUCONATE 324(38)MG
324 (38 FE) TABLET ORAL
Qty: 30 | Refills: 0 | Status: COMPLETED | COMMUNITY
Start: 2017-07-03

## 2018-06-14 RX ORDER — BLOOD SUGAR DIAGNOSTIC
STRIP MISCELLANEOUS
Qty: 100 | Refills: 2 | Status: COMPLETED | COMMUNITY
Start: 2017-03-23 | End: 2018-06-14

## 2018-08-26 NOTE — ED PROVIDER NOTE - CONSTITUTIONAL, MLM
normal... Well appearing, well nourished, awake, alert, oriented to person, place, time/situation and in no apparent distress. 26-Aug-2018 21:01

## 2018-09-04 ENCOUNTER — LABORATORY RESULT (OUTPATIENT)
Age: 58
End: 2018-09-04

## 2018-09-04 ENCOUNTER — APPOINTMENT (OUTPATIENT)
Dept: INTERNAL MEDICINE | Facility: CLINIC | Age: 58
End: 2018-09-04
Payer: MEDICARE

## 2018-09-04 ENCOUNTER — NON-APPOINTMENT (OUTPATIENT)
Age: 58
End: 2018-09-04

## 2018-09-04 VITALS — WEIGHT: 224 LBS | HEIGHT: 62 IN | BODY MASS INDEX: 41.22 KG/M2

## 2018-09-04 VITALS — DIASTOLIC BLOOD PRESSURE: 70 MMHG | SYSTOLIC BLOOD PRESSURE: 130 MMHG

## 2018-09-04 LAB
25(OH)D3 SERPL-MCNC: 41.9 NG/ML
ALBUMIN SERPL ELPH-MCNC: 4.3 G/DL
ALP BLD-CCNC: 84 U/L
ALT SERPL-CCNC: 14 U/L
ANION GAP SERPL CALC-SCNC: 14 MMOL/L
APPEARANCE: ABNORMAL
AST SERPL-CCNC: 21 U/L
BACTERIA: ABNORMAL
BASOPHILS # BLD AUTO: 0.05 K/UL
BASOPHILS NFR BLD AUTO: 0.5 %
BILIRUB SERPL-MCNC: 0.6 MG/DL
BILIRUB UR QL STRIP: NORMAL
BILIRUBIN URINE: NEGATIVE
BLOOD URINE: ABNORMAL
BUN SERPL-MCNC: 18 MG/DL
CALCIUM SERPL-MCNC: 9.9 MG/DL
CHLORIDE SERPL-SCNC: 99 MMOL/L
CHOLEST SERPL-MCNC: 142 MG/DL
CHOLEST/HDLC SERPL: 3 RATIO
CLARITY UR: CLEAR
CO2 SERPL-SCNC: 29 MMOL/L
COLLECTION METHOD: NORMAL
COLOR: YELLOW
CREAT SERPL-MCNC: 1.05 MG/DL
EOSINOPHIL # BLD AUTO: 0.35 K/UL
EOSINOPHIL NFR BLD AUTO: 3.2 %
GLUCOSE QUALITATIVE U: NEGATIVE MG/DL
GLUCOSE SERPL-MCNC: 124 MG/DL
GLUCOSE UR-MCNC: NORMAL
HBA1C MFR BLD HPLC: 5.8 %
HCG UR QL: 0.2 EU/DL
HCT VFR BLD CALC: 39.5 %
HDLC SERPL-MCNC: 47 MG/DL
HGB BLD-MCNC: 13.7 G/DL
HGB UR QL STRIP.AUTO: NORMAL
HYALINE CASTS: 5 /LPF
IMM GRANULOCYTES NFR BLD AUTO: 0.3 %
KETONES UR-MCNC: NORMAL
KETONES URINE: NEGATIVE
LDLC SERPL CALC-MCNC: 73 MG/DL
LEUKOCYTE ESTERASE UR QL STRIP: NORMAL
LEUKOCYTE ESTERASE URINE: ABNORMAL
LYMPHOCYTES # BLD AUTO: 2.13 K/UL
LYMPHOCYTES NFR BLD AUTO: 19.6 %
MAN DIFF?: NORMAL
MCHC RBC-ENTMCNC: 30.2 PG
MCHC RBC-ENTMCNC: 34.7 GM/DL
MCV RBC AUTO: 87.2 FL
MICROSCOPIC-UA: NORMAL
MONOCYTES # BLD AUTO: 1.31 K/UL
MONOCYTES NFR BLD AUTO: 12.1 %
NEUTROPHILS # BLD AUTO: 6.97 K/UL
NEUTROPHILS NFR BLD AUTO: 64.3 %
NITRITE UR QL STRIP: POSITIVE
NITRITE URINE: NEGATIVE
PH UR STRIP: 5.5
PH URINE: 5.5
PLATELET # BLD AUTO: 222 K/UL
POTASSIUM SERPL-SCNC: 4 MMOL/L
PROT SERPL-MCNC: 7.5 G/DL
PROT UR STRIP-MCNC: 100
PROTEIN URINE: 100 MG/DL
PSA SERPL-MCNC: 0.79 NG/ML
RBC # BLD: 4.53 M/UL
RBC # FLD: 14.1 %
RED BLOOD CELLS URINE: 4 /HPF
SAVE SPECIMEN: NORMAL
SODIUM SERPL-SCNC: 142 MMOL/L
SP GR UR STRIP: 1.02
SPECIFIC GRAVITY URINE: 1.02
SQUAMOUS EPITHELIAL CELLS: 0 /HPF
T3RU NFR SERPL: 1.05 INDEX
T4 SERPL-MCNC: 8.3 UG/DL
TRIGL SERPL-MCNC: 111 MG/DL
TSH SERPL-ACNC: 1.63 UIU/ML
URATE SERPL-MCNC: 5.4 MG/DL
UROBILINOGEN URINE: NEGATIVE MG/DL
WBC # FLD AUTO: 10.84 K/UL
WHITE BLOOD CELLS URINE: 104 /HPF

## 2018-09-04 PROCEDURE — 99214 OFFICE O/P EST MOD 30 MIN: CPT | Mod: 25

## 2018-09-04 PROCEDURE — 36415 COLL VENOUS BLD VENIPUNCTURE: CPT

## 2018-09-04 PROCEDURE — 93000 ELECTROCARDIOGRAM COMPLETE: CPT

## 2018-09-04 PROCEDURE — 81003 URINALYSIS AUTO W/O SCOPE: CPT | Mod: QW

## 2018-09-04 NOTE — ASSESSMENT
[FreeTextEntry1] : The patient's physical examination was positive for trace edema. His EKG was normal. Bloods were obtained. He was referred to cardiology an echocardiogram

## 2018-09-04 NOTE — REVIEW OF SYSTEMS
[Fatigue] : fatigue [Vision Problems] : vision problems [Hearing Loss] : hearing loss [Lower Ext Edema] : lower extremity edema [Shortness Of Breath] : shortness of breath [Dyspnea on Exertion] : dyspnea on exertion [Hesitancy] : hesitancy [Frequency] : frequency [Dizziness] : dizziness [Unsteady Walk] : ataxia [Anxiety] : anxiety [Negative] : Integumentary

## 2018-09-04 NOTE — HISTORY OF PRESENT ILLNESS
[de-identified] : This is a 58-year-old gentleman with a history of a gastric bypass, hypertension, diabetes mellitus, hypercholesterolemia who shifted for a followup visit. His main complaint is feeling fatigued and persistent weight gain in spite of the surgery.

## 2018-09-07 ENCOUNTER — RX RENEWAL (OUTPATIENT)
Age: 58
End: 2018-09-07

## 2018-09-07 LAB — BACTERIA UR CULT: ABNORMAL

## 2018-09-18 ENCOUNTER — INPATIENT (INPATIENT)
Facility: HOSPITAL | Age: 58
LOS: 2 days | Discharge: HOME | DRG: 178 | End: 2018-09-21
Attending: SURGERY | Admitting: SURGERY
Payer: MEDICARE

## 2018-09-18 VITALS
OXYGEN SATURATION: 93 % | HEART RATE: 88 BPM | DIASTOLIC BLOOD PRESSURE: 89 MMHG | WEIGHT: 220.9 LBS | SYSTOLIC BLOOD PRESSURE: 157 MMHG | TEMPERATURE: 100 F | RESPIRATION RATE: 14 BRPM | HEIGHT: 65 IN

## 2018-09-18 DIAGNOSIS — Z98.890 OTHER SPECIFIED POSTPROCEDURAL STATES: Chronic | ICD-10-CM

## 2018-09-18 DIAGNOSIS — Z98.84 BARIATRIC SURGERY STATUS: Chronic | ICD-10-CM

## 2018-09-18 DIAGNOSIS — R11.10 VOMITING, UNSPECIFIED: ICD-10-CM

## 2018-09-18 DIAGNOSIS — Z98.84 BARIATRIC SURGERY STATUS: ICD-10-CM

## 2018-09-18 DIAGNOSIS — K21.9 GASTRO-ESOPHAGEAL REFLUX DISEASE WITHOUT ESOPHAGITIS: ICD-10-CM

## 2018-09-18 DIAGNOSIS — B02.8 ZOSTER WITH OTHER COMPLICATIONS: ICD-10-CM

## 2018-09-18 LAB
ALBUMIN SERPL ELPH-MCNC: 3.7 G/DL — SIGNIFICANT CHANGE UP (ref 3.3–5)
ALP SERPL-CCNC: 70 U/L — SIGNIFICANT CHANGE UP (ref 30–120)
ALT FLD-CCNC: 24 U/L DA — SIGNIFICANT CHANGE UP (ref 10–60)
ANION GAP SERPL CALC-SCNC: 6 MMOL/L — SIGNIFICANT CHANGE UP (ref 5–17)
APPEARANCE UR: CLEAR — SIGNIFICANT CHANGE UP
AST SERPL-CCNC: 17 U/L — SIGNIFICANT CHANGE UP (ref 10–40)
BASOPHILS # BLD AUTO: 0.05 K/UL — SIGNIFICANT CHANGE UP (ref 0–0.2)
BASOPHILS NFR BLD AUTO: 0.2 % — SIGNIFICANT CHANGE UP (ref 0–2)
BILIRUB SERPL-MCNC: 0.9 MG/DL — SIGNIFICANT CHANGE UP (ref 0.2–1.2)
BILIRUB UR-MCNC: NEGATIVE — SIGNIFICANT CHANGE UP
BUN SERPL-MCNC: 24 MG/DL — HIGH (ref 7–23)
CALCIUM SERPL-MCNC: 9.5 MG/DL — SIGNIFICANT CHANGE UP (ref 8.4–10.5)
CHLORIDE SERPL-SCNC: 99 MMOL/L — SIGNIFICANT CHANGE UP (ref 96–108)
CO2 SERPL-SCNC: 33 MMOL/L — HIGH (ref 22–31)
COLOR SPEC: YELLOW — SIGNIFICANT CHANGE UP
CREAT SERPL-MCNC: 1.18 MG/DL — SIGNIFICANT CHANGE UP (ref 0.5–1.3)
DIFF PNL FLD: NEGATIVE — SIGNIFICANT CHANGE UP
EOSINOPHIL # BLD AUTO: 0.01 K/UL — SIGNIFICANT CHANGE UP (ref 0–0.5)
EOSINOPHIL NFR BLD AUTO: 0 % — SIGNIFICANT CHANGE UP (ref 0–6)
GLUCOSE SERPL-MCNC: 141 MG/DL — HIGH (ref 70–99)
GLUCOSE UR QL: 250 MG/DL
HCT VFR BLD CALC: 38.1 % — LOW (ref 39–50)
HGB BLD-MCNC: 13.5 G/DL — SIGNIFICANT CHANGE UP (ref 13–17)
IMM GRANULOCYTES NFR BLD AUTO: 0.3 % — SIGNIFICANT CHANGE UP (ref 0–1.5)
KETONES UR-MCNC: ABNORMAL
LEUKOCYTE ESTERASE UR-ACNC: ABNORMAL
LIDOCAIN IGE QN: 130 U/L — SIGNIFICANT CHANGE UP (ref 73–393)
LYMPHOCYTES # BLD AUTO: 0.83 K/UL — LOW (ref 1–3.3)
LYMPHOCYTES # BLD AUTO: 3.6 % — LOW (ref 13–44)
MCHC RBC-ENTMCNC: 30.2 PG — SIGNIFICANT CHANGE UP (ref 27–34)
MCHC RBC-ENTMCNC: 35.4 GM/DL — SIGNIFICANT CHANGE UP (ref 32–36)
MCV RBC AUTO: 85.2 FL — SIGNIFICANT CHANGE UP (ref 80–100)
MONOCYTES # BLD AUTO: 1.51 K/UL — HIGH (ref 0–0.9)
MONOCYTES NFR BLD AUTO: 6.6 % — SIGNIFICANT CHANGE UP (ref 2–14)
NEUTROPHILS # BLD AUTO: 20.53 K/UL — HIGH (ref 1.8–7.4)
NEUTROPHILS NFR BLD AUTO: 89.3 % — HIGH (ref 43–77)
NITRITE UR-MCNC: NEGATIVE — SIGNIFICANT CHANGE UP
PH UR: 5 — SIGNIFICANT CHANGE UP (ref 5–8)
PLATELET # BLD AUTO: 207 K/UL — SIGNIFICANT CHANGE UP (ref 150–400)
POTASSIUM SERPL-MCNC: 3 MMOL/L — LOW (ref 3.5–5.3)
POTASSIUM SERPL-SCNC: 3 MMOL/L — LOW (ref 3.5–5.3)
PROT SERPL-MCNC: 7.3 G/DL — SIGNIFICANT CHANGE UP (ref 6–8.3)
PROT UR-MCNC: 30 MG/DL
RBC # BLD: 4.47 M/UL — SIGNIFICANT CHANGE UP (ref 4.2–5.8)
RBC # FLD: 13.1 % — SIGNIFICANT CHANGE UP (ref 10.3–14.5)
SODIUM SERPL-SCNC: 138 MMOL/L — SIGNIFICANT CHANGE UP (ref 135–145)
SP GR SPEC: 1.01 — SIGNIFICANT CHANGE UP (ref 1.01–1.02)
TROPONIN I SERPL-MCNC: 0.01 NG/ML — LOW (ref 0.02–0.06)
UROBILINOGEN FLD QL: NEGATIVE MG/DL — SIGNIFICANT CHANGE UP
WBC # BLD: 23 K/UL — HIGH (ref 3.8–10.5)
WBC # FLD AUTO: 23 K/UL — HIGH (ref 3.8–10.5)

## 2018-09-18 PROCEDURE — 99223 1ST HOSP IP/OBS HIGH 75: CPT

## 2018-09-18 PROCEDURE — 71250 CT THORAX DX C-: CPT | Mod: 26

## 2018-09-18 PROCEDURE — 99285 EMERGENCY DEPT VISIT HI MDM: CPT

## 2018-09-18 PROCEDURE — 71045 X-RAY EXAM CHEST 1 VIEW: CPT | Mod: 26

## 2018-09-18 PROCEDURE — 99222 1ST HOSP IP/OBS MODERATE 55: CPT

## 2018-09-18 PROCEDURE — 93010 ELECTROCARDIOGRAM REPORT: CPT

## 2018-09-18 RX ORDER — POTASSIUM CHLORIDE 20 MEQ
40 PACKET (EA) ORAL ONCE
Qty: 0 | Refills: 0 | Status: COMPLETED | OUTPATIENT
Start: 2018-09-18 | End: 2018-09-18

## 2018-09-18 RX ORDER — ONDANSETRON 8 MG/1
4 TABLET, FILM COATED ORAL EVERY 6 HOURS
Qty: 0 | Refills: 0 | Status: DISCONTINUED | OUTPATIENT
Start: 2018-09-18 | End: 2018-09-21

## 2018-09-18 RX ORDER — PANTOPRAZOLE SODIUM 20 MG/1
8 TABLET, DELAYED RELEASE ORAL
Qty: 80 | Refills: 0 | Status: DISCONTINUED | OUTPATIENT
Start: 2018-09-18 | End: 2018-09-19

## 2018-09-18 RX ORDER — OFLOXACIN 0.3 %
10 DROPS OPHTHALMIC (EYE) DAILY
Qty: 0 | Refills: 0 | Status: DISCONTINUED | OUTPATIENT
Start: 2018-09-18 | End: 2018-09-21

## 2018-09-18 RX ORDER — ACETAMINOPHEN 500 MG
650 TABLET ORAL EVERY 6 HOURS
Qty: 0 | Refills: 0 | Status: DISCONTINUED | OUTPATIENT
Start: 2018-09-18 | End: 2018-09-21

## 2018-09-18 RX ORDER — METOCLOPRAMIDE HCL 10 MG
10 TABLET ORAL THREE TIMES A DAY
Qty: 0 | Refills: 0 | Status: DISCONTINUED | OUTPATIENT
Start: 2018-09-18 | End: 2018-09-21

## 2018-09-18 RX ORDER — HEPARIN SODIUM 5000 [USP'U]/ML
5000 INJECTION INTRAVENOUS; SUBCUTANEOUS EVERY 12 HOURS
Qty: 0 | Refills: 0 | Status: DISCONTINUED | OUTPATIENT
Start: 2018-09-21 | End: 2018-09-21

## 2018-09-18 RX ORDER — POTASSIUM CHLORIDE 20 MEQ
10 PACKET (EA) ORAL
Qty: 0 | Refills: 0 | Status: COMPLETED | OUTPATIENT
Start: 2018-09-18 | End: 2018-09-18

## 2018-09-18 RX ORDER — DOCUSATE SODIUM 100 MG
100 CAPSULE ORAL THREE TIMES A DAY
Qty: 0 | Refills: 0 | Status: DISCONTINUED | OUTPATIENT
Start: 2018-09-18 | End: 2018-09-21

## 2018-09-18 RX ORDER — VALACYCLOVIR 500 MG/1
1000 TABLET, FILM COATED ORAL EVERY 8 HOURS
Qty: 0 | Refills: 0 | Status: DISCONTINUED | OUTPATIENT
Start: 2018-09-18 | End: 2018-09-21

## 2018-09-18 RX ORDER — VALACYCLOVIR 500 MG/1
1000 TABLET, FILM COATED ORAL ONCE
Qty: 0 | Refills: 0 | Status: COMPLETED | OUTPATIENT
Start: 2018-09-18 | End: 2018-09-18

## 2018-09-18 RX ORDER — SODIUM CHLORIDE 9 MG/ML
3 INJECTION INTRAMUSCULAR; INTRAVENOUS; SUBCUTANEOUS EVERY 8 HOURS
Qty: 0 | Refills: 0 | Status: DISCONTINUED | OUTPATIENT
Start: 2018-09-18 | End: 2018-09-21

## 2018-09-18 RX ORDER — SENNA PLUS 8.6 MG/1
2 TABLET ORAL AT BEDTIME
Qty: 0 | Refills: 0 | Status: DISCONTINUED | OUTPATIENT
Start: 2018-09-18 | End: 2018-09-21

## 2018-09-18 RX ORDER — ONDANSETRON 8 MG/1
4 TABLET, FILM COATED ORAL ONCE
Qty: 0 | Refills: 0 | Status: COMPLETED | OUTPATIENT
Start: 2018-09-18 | End: 2018-09-18

## 2018-09-18 RX ORDER — SODIUM CHLORIDE 9 MG/ML
1000 INJECTION INTRAMUSCULAR; INTRAVENOUS; SUBCUTANEOUS ONCE
Qty: 0 | Refills: 0 | Status: COMPLETED | OUTPATIENT
Start: 2018-09-18 | End: 2018-09-18

## 2018-09-18 RX ORDER — METRONIDAZOLE 500 MG
500 TABLET ORAL ONCE
Qty: 0 | Refills: 0 | Status: COMPLETED | OUTPATIENT
Start: 2018-09-18 | End: 2018-09-18

## 2018-09-18 RX ORDER — SODIUM CHLORIDE 9 MG/ML
1000 INJECTION INTRAMUSCULAR; INTRAVENOUS; SUBCUTANEOUS
Qty: 0 | Refills: 0 | Status: DISCONTINUED | OUTPATIENT
Start: 2018-09-18 | End: 2018-09-21

## 2018-09-18 RX ORDER — ATENOLOL 25 MG/1
50 TABLET ORAL
Qty: 0 | Refills: 0 | Status: DISCONTINUED | OUTPATIENT
Start: 2018-09-18 | End: 2018-09-21

## 2018-09-18 RX ADMIN — PANTOPRAZOLE SODIUM 10 MG/HR: 20 TABLET, DELAYED RELEASE ORAL at 17:15

## 2018-09-18 RX ADMIN — SODIUM CHLORIDE 80 MILLILITER(S): 9 INJECTION INTRAMUSCULAR; INTRAVENOUS; SUBCUTANEOUS at 21:12

## 2018-09-18 RX ADMIN — Medication 40 MILLIEQUIVALENT(S): at 16:12

## 2018-09-18 RX ADMIN — SODIUM CHLORIDE 1000 MILLILITER(S): 9 INJECTION INTRAMUSCULAR; INTRAVENOUS; SUBCUTANEOUS at 14:15

## 2018-09-18 RX ADMIN — Medication 100 MILLIEQUIVALENT(S): at 17:12

## 2018-09-18 RX ADMIN — PANTOPRAZOLE SODIUM 10 MG/HR: 20 TABLET, DELAYED RELEASE ORAL at 16:12

## 2018-09-18 RX ADMIN — PANTOPRAZOLE SODIUM 10 MG/HR: 20 TABLET, DELAYED RELEASE ORAL at 21:12

## 2018-09-18 RX ADMIN — Medication 100 MILLIGRAM(S): at 22:13

## 2018-09-18 RX ADMIN — SODIUM CHLORIDE 1000 MILLILITER(S): 9 INJECTION INTRAMUSCULAR; INTRAVENOUS; SUBCUTANEOUS at 16:15

## 2018-09-18 RX ADMIN — Medication 100 MILLIGRAM(S): at 22:06

## 2018-09-18 RX ADMIN — Medication 10 MILLIGRAM(S): at 22:06

## 2018-09-18 RX ADMIN — SODIUM CHLORIDE 3 MILLILITER(S): 9 INJECTION INTRAMUSCULAR; INTRAVENOUS; SUBCUTANEOUS at 22:09

## 2018-09-18 RX ADMIN — PANTOPRAZOLE SODIUM 10 MG/HR: 20 TABLET, DELAYED RELEASE ORAL at 18:41

## 2018-09-18 RX ADMIN — VALACYCLOVIR 1000 MILLIGRAM(S): 500 TABLET, FILM COATED ORAL at 22:05

## 2018-09-18 RX ADMIN — Medication 100 MILLIEQUIVALENT(S): at 18:21

## 2018-09-18 RX ADMIN — VALACYCLOVIR 1000 MILLIGRAM(S): 500 TABLET, FILM COATED ORAL at 16:13

## 2018-09-18 RX ADMIN — Medication 100 MILLIEQUIVALENT(S): at 16:07

## 2018-09-18 RX ADMIN — Medication 10 DROP(S): at 22:05

## 2018-09-18 NOTE — CONSULT NOTE ADULT - ASSESSMENT
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 N/V.  Hx of S/P laparoscopic sleeve gastrectomy.  Esophogram order.  Further care as per Surgery  2. ??Questionable facial Shingle.  Continue with valtrex pending ID evaluation  3.  JOSÉ.  Continue with CPAP.  Pulmonary consult  4. DM2.  Continue with ISS, and Monitor POC  5.  Leukocytosis.  Unclear Etiology.  UA/CXR is negative.  possible aspiration PNA.  Given his  regurgitation, and persistent cough. Will obtain chest CT scan.  Empirical Levaquin, and flagyl X1 dose.  Obtain UC/BC/ID consult.     6.  HTN.  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 N/V.  Hx of S/P laparoscopic sleeve gastrectomy.  Esophogram order.  Further care as per Surgery  2. ??Questionable facial Shingle.  Continue with valtrex pending ID evaluation  3.  JOSÉ.  Continue with CPAP.  Pulmonary consult  4. DM2.  Continue with ISS, and Monitor POC  5.  Leukocytosis.  Unclear Etiology.  UA/CXR is negative.  possible aspiration PNA.  Given his  regurgitation, and persistent cough. Will obtain chest CT scan.  Empirical Levaquin, and flagyl X1 dose.  Obtain UC/BC/ID consult.     6.  HTN.  Continue with home medications with holding parameter, and Monitor BP  7.  Otitis media/externa.  continue with Levaquin and add ofloxacin otic    Plan of care was discussed with patient in great details, All questions were answered to their satisfaction  Seems to understand, and in agreement
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
## 2018-09-18 NOTE — ED ADULT NURSE NOTE - OBJECTIVE STATEMENT
Patient report having nausea and vomiting several times since 4AM, Has Hx of gastric bypass surgery sleeve done February 2018.

## 2018-09-18 NOTE — ED ADULT NURSE NOTE - PSH
History of Arthroscopic Knee Surgery  right  S/P cystoscopy  2003- removal of bladder tumor 2003  S/P Cystoscopy  every year;  S/P laparoscopic sleeve gastrectomy

## 2018-09-18 NOTE — ED PROVIDER NOTE - MEDICAL DECISION MAKING DETAILS
57 y/o male with mult medical problems  presents with n/v since this morning belly soft non tendern non distended, +wbc, hypokalemic, fluids and Kriders given vesicular rash noted on right side of face, suspected shingles started on valtrex admit to Surgery for further work up

## 2018-09-18 NOTE — CONSULT NOTE ADULT - PROBLEM SELECTOR RECOMMENDATION 3
Plan to replenish K  - recheck .   Chest xray / EKG  Will consider GI consult and upper EGD.    Continued monitoring / assessment in ED  IV hydration- including multi vitamin bag.  IV PPI- Protonix 40 mg . May consider Carafate suspension.   IV Zofran as needed.   Plan to discharge to home  when patient is stable. Plan to replenish K  - recheck .   Chest xray / EKG  Plan to be seen by medicine. Will consider starting anti viral medication.  Will have a video Esophagram tomorrow am  Will consider additional imaging if symptoms persist or worsen.   Will consider GI consult and upper EGD.    Continued monitoring / assessment in ED  IV hydration- including multi vitamin bag.  IV PPI- Protonix 40 mg . May consider Carafate suspension.   IV Zofran as needed.   Plan to discharge to home  when patient is stable.  IV hydration- including multi vitamin bag.  IV PPI- Protonix 40 mg . May consider Carafate suspension.   IV Zofran as needed.   Plan to discharge to home  when patient is stable.

## 2018-09-18 NOTE — PATIENT PROFILE ADULT. - NS TRANSFER PATIENT BELONGINGS
Clothing/Jewelry/Money (specify)/Cell Phone/PDA (specify)/Other belongings Clothing/Cell Phone/PDA (specify)/Jewelry/Money (specify)

## 2018-09-18 NOTE — CONSULT NOTE ADULT - ASSESSMENT
59 yo M s/p lap sleeve gastrectomy with intra op EGD and hiatal hernia repair - 2/8/17- Dr. Garcia - with complaints of exacerbation of GERD symptoms/ chest pain / episodic vomiting since this am. Pt reports with worsening nocturnal reflux symptoms - and regurgitation of food > 3  months. Pt states getting up this morning ~ 4 am with complaints of chest discomfort  and continued GERD symptoms.    Awaiting results of chest xray - rule out aspiration pneumonia.      + Hypokalemia  / + Leukocytosis. ( plan to recheck ).      Plan to replenish K  - recheck .   Chest xray / EKG  Will consider GI consult and upper EGD.    Continued monitoring / assessment in ED  IV hydration- including multi vitamin bag.  IV PPI- Protonix 40 mg . May consider Carafate suspension.   IV Zofran as needed.   Plan to discharge to home  when patient is stable. 59 yo M s/p lap sleeve gastrectomy with intra op EGD and hiatal hernia repair - 2/8/17- Dr. Garcia - with complaints of exacerbation of GERD symptoms/ chest pain / episodic vomiting since this am. Pt reports with worsening nocturnal reflux symptoms - and regurgitation of food > 3  months. Pt states getting up this morning ~ 4 am with complaints of chest discomfort  and continued GERD symptoms.    Awaiting results of chest xray - rule out aspiration pneumonia.      + Hypokalemia  / + Leukocytosis. ( plan to recheck ).   +Herpes zoster.     Plan to replenish K  - recheck .   Chest xray / EKG  Plan to be seen by medicine. Will consider starting anti viral medication.  Will have a video Esophagram tomorrow am  Will consider additional imaging if symptoms persist or worsen.   Will consider GI consult and upper EGD.    Continued monitoring / assessment in ED  IV hydration- including multi vitamin bag.  IV PPI- Protonix 40 mg . May consider Carafate suspension.   IV Zofran as needed.   Plan to discharge to home  when patient is stable.

## 2018-09-18 NOTE — ED PROVIDER NOTE - CARE PLAN
Principal Discharge DX:	Herpes zoster with complication  Secondary Diagnosis:	Non-intractable vomiting with nausea, unspecified vomiting type  Secondary Diagnosis:	Hypokalemia

## 2018-09-18 NOTE — ED PROVIDER NOTE - PROGRESS NOTE DETAILS
Jacquelyn PANG for Dr. Hess: 59y/o male with PMHx of DM, bladder CA, HTN presents to the ED with NV and dizziness started his AM after taking morning medications. Pt states that at this time NV has relieved. Pt states he has just finished a course of abx for a UTI. Denies fever. Pt with rash to right side of cheek x three days, pt states he started feeling an ear pain and then the rash began. PE: vesicular rash to inner ear ricky extending out to tragus and to right maxillary facial area. Decreased breathe sounds B/L, no wheezing. Mild epigastric TTP, abd soft. No pedal edema moving all extremities. A&O. MDM: 59y/o male hx of gastric sleeve presents with NV f/u with bariatric sx IV fluid, antiemetics, supplement electrolytes, labs, antiviral therapy for shingles.

## 2018-09-18 NOTE — ED ADULT NURSE NOTE - NSIMPLEMENTINTERV_GEN_ALL_ED
Implemented All Universal Safety Interventions:  [place] to call system. Call bell, personal items and telephone within reach. Instruct patient to call for assistance. Room bathroom lighting operational. Non-slip footwear when patient is off stretcher. Physically safe environment: no spills, clutter or unnecessary equipment. Stretcher in lowest position, wheels locked, appropriate side rails in place.

## 2018-09-18 NOTE — CONSULT NOTE ADULT - ATTENDING COMMENTS
Patient seen and examined. Pt s/p sleeve gastrectomy 2/2017 post op patient has and GERD and nocturnal reflux. Today he comes to the ER complaining of vomiting and not feeling well.  Found to have shingles by ER.  Afeb VSS, abdomen soft.  WBC 23 unclear source, CXR neg, recent UTI treated as outpatient.  Will admit, IVF, Medicine consult, PPI, videoesophagram in AM possible GI evaluation.

## 2018-09-18 NOTE — CONSULT NOTE ADULT - SUBJECTIVE AND OBJECTIVE BOX
57 yo M s/p lap sleeve gastrectomy with intra op EGD and hiatal hernia repair - 2/8/17- Dr. Garcia - with complaints of exacerbation of GERD symptoms/ chest pain / episodic vomiting since this am. Pt reports with worsening nocturnal reflux symptoms - and regurgitation of food > 3  months. Pt states getting up this morning ~ 4 am with complaints of chest discomfort  and continued GERD symptoms. Pt states he started vomiting multiple times this morning ~ 8 am. Denies any fever or chills. Denies any urinary symptoms - no dysuria, no polyuria , no urinary urgency. Currently taking omeprazole 40 mg po qd with little relief.        CBC - elevated WBC - 23 otherwise normal.    Low K - 3.0        REVIEW OF SYSTEMS :    General: Fatigue malaise.   Cardiovascular : Negative  Gastrointestinal : + vomited this am. GERD symptoms.  Extremities : Negative  Neuro : Negative  Psych : Negative       General: Alert and oriented x 3 . NAD  Cardio : S1 S2 . Normal sinus rhythm   Abdomen : Soft non tender non distended. Normoactive BS. No erythema noted. No palpable masses noted.  Extremity : No clubbing / cyanosis / edema noted bilateral Lower/ Upper Extremities.  Neuro : Motor / Sensory Function grossly intact bilateral  All Extremities  Psych : Affect appropriate. 57 yo M s/p lap sleeve gastrectomy with intra op EGD and hiatal hernia repair - 2/8/17- Dr. Garcia - with complaints of exacerbation of GERD symptoms/ chest pain / episodic vomiting since this am. Pt reports with worsening nocturnal reflux symptoms - and regurgitation of food-  > 3  months. Pt states getting up this morning ~ 4 am with complaints of chest discomfort  and continued GERD symptoms. Pt states he started vomiting multiple times this morning ~ 8 am. Denies any fever or chills. Denies any urinary symptoms - no dysuria, no polyuria , no urinary urgency. Currently taking omeprazole 40 mg po qd with little relief. Shingles/ herpes zoster on face.         CBC - elevated WBC - 23 otherwise normal.    Low K - 3.0        REVIEW OF SYSTEMS :    General: Fatigue malaise.   Cardiovascular : Negative  Gastrointestinal : + vomited this am. GERD symptoms.  Extremities : Negative  Neuro : Negative  Psych : Negative       General: Alert and oriented x 3 . NAD + herpes zoster.   Cardio : S1 S2 . Normal sinus rhythm   Abdomen : Soft non tender non distended. Normoactive BS. No erythema noted. No palpable masses noted.  Extremity : No clubbing / cyanosis / edema noted bilateral Lower/ Upper Extremities.  Neuro : Motor / Sensory Function grossly intact bilateral  All Extremities  Psych : Affect appropriate.

## 2018-09-18 NOTE — ED PROVIDER NOTE - OBJECTIVE STATEMENT
59 y/o male  with mult medical problems  1 year s/p gastric sleeve surgery at [facility] by dr Garcia  presents to the ED today with rapid onset of N/V  pt states that he woke up at around 4am with this burning sensation in epigastric region walked around had water and went back to bed  then at 8am woke up and had mult episodes of vomiting   pt took his home medications but threw them up  denies any abd pain or chest pain  pt able to tolerate liquids but no food 57 y/o male  Bladder CA/ DM / History of herniated discs/ HTN / JOSÉ   1 year s/p gastric sleeve surgery at [facility] by dr Garcia  presents to the ED today with rapid onset of N/V since this morning   pt states that he woke up at around 4am with this burning sensation in epigastric region walked around had water and went back to bed  then at 8am woke up and had mult episodes of vomiting   vomit is non bloody and non billious   pt took his home medications but threw them up  denies any abd pain or chest pain  pt able to tolerate liquids but no food  ** rec'd call from GI office AIMEE Man informed that pt was coming in, and informed of symptoms and to get basic labs and hydrate

## 2018-09-18 NOTE — ED ADULT NURSE REASSESSMENT NOTE - NS ED NURSE REASSESS COMMENT FT1
Patient declines Zofran. Irina all symptoms resolved at 11:30 am. States that he came in as a precaution "just in case it starts again".

## 2018-09-18 NOTE — CONSULT NOTE ADULT - PROBLEM SELECTOR RECOMMENDATION 9
Plan to replenish K  - recheck .   Chest xray / EKG  Will consider GI consult and upper EGD.    Continued monitoring / assessment in ED  IV hydration- including multi vitamin bag.  IV PPI- Protonix 40 mg . May consider Carafate suspension.   IV Zofran as needed.   Plan to discharge to home  when patient is stable. Plan to replenish K  - recheck .   Chest xray / EKG  Plan to be seen by medicine. Will consider starting anti viral medication.  Will have a video Esophagram tomorrow am  Will consider additional imaging if symptoms persist or worsen.   Will consider GI consult and upper EGD.    Continued monitoring / assessment in ED  IV hydration- including multi vitamin bag.  IV PPI- Protonix 40 mg . May consider Carafate suspension.   IV Zofran as needed.   Plan to discharge to home  when patient is stable.

## 2018-09-18 NOTE — CONSULT NOTE ADULT - SUBJECTIVE AND OBJECTIVE BOX
CC.  N/V  HPI.  Patient is 59 yo male with hx of s/p lap sleeve gastrectomy with intra op EGD and hiatal hernia repair - 17- Dr. Garcia  presenting with N/V, and reflex symptoms that started this morning.  Pt reports with worsening nocturnal reflux symptoms - and regurgitation of food-  > 3  months. Pt states he started vomiting multiple times this morning ~ 8 am. Denies any fever, chills. CP, SOB, palpitation, Diarrhea, Blurry vision, decrease in vision, abdominal pain, Numbness, weakness, headache, dizziness, or neck pain.  Patient though to have rash on the right side of the face that has been going on for the past several days.  rash is not painful. Hx of chicken pox as child.   Shingles/ herpes zoster on face.     Patient reports that he finished a course of doxycycline for UTI.  Urine culture from  shows ECOLI.      Constitutional: No fever, fatigue or weight loss.  Skin: No rash.  Eyes: No recent vision problems or eye pain.  ENT: No congestion, ear pain, or sore throat.  Endocrine: No thyroid problems.  Cardiovascular: No chest pain or palpation.  Respiratory: No cough, shortness of breath, congestion, or wheezing.  Gastrointestinal: No abdominal pain, nausea, vomiting, or diarrhea.  Genitourinary: No dysuria.  Musculoskeletal: No joint swelling.  Neurologic: No headache.        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  WANTS TO BE POSITIONED ON SIDE WITH 2 TOWELS UNDER HEAD  Febrile Seizure: during childhood  Morbidly Obese  Bladder Cancer: since , tumor removed and now reportedly in remission  Diabetes Mellitus Type II  Hyperlipidemia  HTN (Hypertension)  S/P laparoscopic sleeve gastrectomy  S/P cystoscopy: - removal of bladder tumor   S/P Cystoscopy: every year;  History of Arthroscopic Knee Surgery: right      HOME MEDICATIONS:    · 	omeprazole 20 mg oral delayed release capsule: 1 cap(s) orally once a day, Last Dose Taken:    · 	doxazosin 8 mg oral tablet: 1 tab(s) orally once a day, Last Dose Taken:    · 	benazepril 40 mg oral tablet: 1 tab(s) orally once a day, Last Dose Taken:    · 	metFORMIN 500 mg oral tablet: 1 tab(s) orally 2 times a day, Last Dose Taken:    · 	amLODIPine 10 mg oral tablet: 1 tab(s) orally once a day, Last Dose Taken:    · 	losartan-hydroCHLOROthiazide 50mg-12.5mg oral tablet: 1 tab(s) orally once a day, Last Dose Taken:    · 	atenolol 100 mg oral tablet: 0.5 tab(s) orally 2 times a day, Last Dose Taken:    · 	Klor-Con Sprinkle 8 mEq oral capsule, extended release: 1 cap(s) orally once a day  · 	Vitamin D3 1000 intl units oral capsule: 1 cap(s) orally once a day  · 	Vitamin B12 1000 mcg oral tablet: 1 tab(s) orally once a day  · 	Reglan 10 mg oral tablet:  orally 3 times a day, Last Dose Taken:      ALLERGIES and INTOLERANCES:  penicillins (Rash)  STARCH used in bedding (Hives; Rash)      SOCIAL HISTORY:  Denies ETOH, Tobacco, Illicit Drugs    FAMILY HISTORY:  Family history of coronary artery disease (Father)  Family history of cerebrovascular accident (CVA) (Father)    Vital Signs Last 24 Hrs  T(C): 37.3 (18 Sep 2018 16:24), Max: 37.6 (18 Sep 2018 13:19)  T(F): 99.1 (18 Sep 2018 16:24), Max: 99.7 (18 Sep 2018 13:19)  HR: 82 (18 Sep 2018 16:24) (82 - 88)  BP: 158/74 (18 Sep 2018 16:24) (157/89 - 158/74)  BP(mean): --  RR: 14 (18 Sep 2018 16:24) (14 - 14)  SpO2: 99% (18 Sep 2018 16:24) (93% - 99%)      PHYSICAL EXAM-  GENERAL: NAD, well-groomed, well-developed  HEAD:  Atraumatic, Normocephalic  EYES: EOMI, PERRLA, conjunctiva and sclera clear  NECK: Supple, No JVD, Normal thyroid  NERVOUS SYSTEM:  Alert & Oriented X3, Motor Strength 5/5 B/L upper and lower extremities; DTRs 2+ intact and symmetric  CHEST/LUNG: Clear to percussion bilaterally; No rales, rhonchi, wheezing, or rubs  HEART: Regular rate and rhythm; No murmurs, rubs, or gallops  ABDOMEN: Soft, Nontender, Nondistended; Bowel sounds present  EXTREMITIES:  2+ Peripheral Pulses, No clubbing, cyanosis, or edema  SKIN: No rashes or lesions                              13.5   23.00 )-----------( 207      ( 18 Sep 2018 14:29 )             38.1         138  |  99  |  24<H>  ----------------------------<  141<H>  3.0<L>   |  33<H>  |  1.18    Ca    9.5      18 Sep 2018 14:29    TPro  7.3  /  Alb  3.7  /  TBili  0.9  /  DBili  x   /  AST  17  /  ALT  24  /  AlkPhos  70            Urinalysis Basic - ( 18 Sep 2018 14:29 )    Color: Yellow / Appearance: Clear / S.015 / pH: x  Gluc: x / Ketone: Trace  / Bili: Negative / Urobili: Negative mg/dL   Blood: x / Protein: 30 mg/dL / Nitrite: Negative   Leuk Esterase: Trace / RBC: 0-2 /HPF / WBC 0-2   Sq Epi: x / Non Sq Epi: Few / Bacteria: Few      Imaging Personally Reviewed:     [x ] YES  [ ] NO    Consultant(s) Notes Reviewed:  [x ] YES  [ ] NO    Care Discussed with Consultants/Other Providers [x ] YES  [ ] NO Care Discussed with Consultants/Other Providers [x ] YES  [ ] No medical contraindication for dischargeHPI.  Patient is 57 yo male with hx of s/p lap sleeve gastrectomy with intra op EGD and hiatal hernia repair - 17- Dr. Garcia  presenting with N/V, and reflex symptoms that started this morning.  Pt reports with worsening nocturnal reflux symptoms - and regurgitation of food-  > 3  months. Pt states he started vomiting multiple times this morning ~ 8 am. Denies any fever, chills. CP, SOB, palpitation, Diarrhea, Blurry vision, decrease in vision, abdominal pain, Numbness, weakness, headache, dizziness, or neck pain.  Patient though to have rash on the right side of the face that has been going on for the past several days.  rash is not painful. Hx of chicken pox as child.   Shingles/ herpes zoster on face.     Patient reports that he finished a course of doxycycline for UTI.  Urine culture from  shows ECOLI.      Constitutional: No fever, fatigue or weight loss.  Skin: No rash.  Eyes: No recent vision problems or eye pain.  ENT: No congestion, ear pain, or sore throat.  Endocrine: No thyroid problems.  Cardiovascular: No chest pain or palpation.  Respiratory: No cough, shortness of breath, congestion, or wheezing.  Gastrointestinal: No abdominal pain, nausea, vomiting, or diarrhea.  Genitourinary: No dysuria.  Musculoskeletal: No joint swelling.  Neurologic: No headache.        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  WANTS TO BE POSITIONED ON SIDE WITH 2 TOWELS UNDER HEAD  Febrile Seizure: during childhood  Morbidly Obese  Bladder Cancer: since , tumor removed and now reportedly in remission  Diabetes Mellitus Type II  Hyperlipidemia  HTN (Hypertension)  S/P laparoscopic sleeve gastrectomy  S/P cystoscopy: - removal of bladder tumor   S/P Cystoscopy: every year;  History of Arthroscopic Knee Surgery: right      HOME MEDICATIONS:    · 	omeprazole 20 mg oral delayed release capsule: 1 cap(s) orally once a day, Last Dose Taken:    · 	doxazosin 8 mg oral tablet: 1 tab(s) orally once a day, Last Dose Taken:    · 	benazepril 40 mg oral tablet: 1 tab(s) orally once a day, Last Dose Taken:    · 	metFORMIN 500 mg oral tablet: 1 tab(s) orally 2 times a day, Last Dose Taken:    · 	amLODIPine 10 mg oral tablet: 1 tab(s) orally once a day, Last Dose Taken:    · 	losartan-hydroCHLOROthiazide 50mg-12.5mg oral tablet: 1 tab(s) orally once a day, Last Dose Taken:    · 	atenolol 100 mg oral tablet: 0.5 tab(s) orally 2 times a day, Last Dose Taken:    · 	Klor-Con Sprinkle 8 mEq oral capsule, extended release: 1 cap(s) orally once a day  · 	Vitamin D3 1000 intl units oral capsule: 1 cap(s) orally once a day  · 	Vitamin B12 1000 mcg oral tablet: 1 tab(s) orally once a day  · 	Reglan 10 mg oral tablet:  orally 3 times a day, Last Dose Taken:      ALLERGIES and INTOLERANCES:  penicillins (Rash)  STARCH used in bedding (Hives; Rash)      SOCIAL HISTORY:  Denies ETOH, Tobacco, Illicit Drugs    FAMILY HISTORY:  Family history of coronary artery disease (Father)  Family history of cerebrovascular accident (CVA) (Father)    Vital Signs Last 24 Hrs  T(C): 37.3 (18 Sep 2018 16:24), Max: 37.6 (18 Sep 2018 13:19)  T(F): 99.1 (18 Sep 2018 16:24), Max: 99.7 (18 Sep 2018 13:19)  HR: 82 (18 Sep 2018 16:24) (82 - 88)  BP: 158/74 (18 Sep 2018 16:24) (157/89 - 158/74)  BP(mean): --  RR: 14 (18 Sep 2018 16:24) (14 - 14)  SpO2: 99% (18 Sep 2018 16:24) (93% - 99%)      PHYSICAL EXAM-  GENERAL: NAD, well-groomed, well-developed  HEAD:  Atraumatic, Normocephalic  EYES: EOMI, PERRLA, conjunctiva and sclera clear  NECK: Supple, No JVD, Normal thyroid  NERVOUS SYSTEM:  Alert & Oriented X3, Motor Strength 5/5 B/L upper and lower extremities; DTRs 2+ intact and symmetric  CHEST/LUNG: Clear to percussion bilaterally; No rales, rhonchi, wheezing, or rubs  HEART: Regular rate and rhythm; No murmurs, rubs, or gallops  ABDOMEN: Soft, Nontender, Nondistended; Bowel sounds present  EXTREMITIES:  2+ Peripheral Pulses, No clubbing, cyanosis, or edema  SKIN: No rashes or lesions                              13.5   23.00 )-----------( 207      ( 18 Sep 2018 14:29 )             38.1         138  |  99  |  24<H>  ----------------------------<  141<H>  3.0<L>   |  33<H>  |  1.18    Ca    9.5      18 Sep 2018 14:29    TPro  7.3  /  Alb  3.7  /  TBili  0.9  /  DBili  x   /  AST  17  /  ALT  24  /  AlkPhos  70            Urinalysis Basic - ( 18 Sep 2018 14:29 )    Color: Yellow / Appearance: Clear / S.015 / pH: x  Gluc: x / Ketone: Trace  / Bili: Negative / Urobili: Negative mg/dL   Blood: x / Protein: 30 mg/dL / Nitrite: Negative   Leuk Esterase: Trace / RBC: 0-2 /HPF / WBC 0-2   Sq Epi: x / Non Sq Epi: Few / Bacteria: Few      Imaging Personally Reviewed:     [x ] YES  [ ] NO    Consultant(s) Notes Reviewed:  [x ] YES  [ ] NO    Care Discussed with Consultants/Other Providers [x ] YES  [ ] NO    EKG shows NSR no ischemic changes Care Discussed with Consultants/Other Providers [x ] YES  [ ] No medical contraindication for dischargeHPI.  Patient is 59 yo male with hx of s/p lap sleeve gastrectomy with intra op EGD and hiatal hernia repair - 17- Dr. Garcia  presenting with N/V, and reflex symptoms that started this morning.  Pt reports with worsening nocturnal reflux symptoms - and regurgitation of food-  > 3  months. Pt states he started vomiting multiple times this morning ~ 8 am. Denies any fever, chills. CP, SOB, palpitation, Diarrhea, Blurry vision, decrease in vision, abdominal pain, Numbness, weakness, headache, dizziness, or neck pain.  Patient though to have rash on the right side of the face that has been going on for the past several days.  rash is not painful. Hx of chicken pox as child.   Shingles/ herpes zoster on face.     Patient reports that he finished a course of doxycycline for UTI.  Urine culture from  shows ECOLI.      +right ear pain    Constitutional: No fever, fatigue or weight loss.  Skin: No rash.  Eyes: No recent vision problems or eye pain.  ENT: No congestion, ear pain, or sore throat.  Endocrine: No thyroid problems.  Cardiovascular: No chest pain or palpation.  Respiratory: No cough, shortness of breath, congestion, or wheezing.  Gastrointestinal: No abdominal pain, nausea, vomiting, or diarrhea.  Genitourinary: No dysuria.  Musculoskeletal: No joint swelling.  Neurologic: No headache.        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  WANTS TO BE POSITIONED ON SIDE WITH 2 TOWELS UNDER HEAD  Febrile Seizure: during childhood  Morbidly Obese  Bladder Cancer: since , tumor removed and now reportedly in remission  Diabetes Mellitus Type II  Hyperlipidemia  HTN (Hypertension)  S/P laparoscopic sleeve gastrectomy  S/P cystoscopy: - removal of bladder tumor   S/P Cystoscopy: every year;  History of Arthroscopic Knee Surgery: right      HOME MEDICATIONS:    · 	omeprazole 20 mg oral delayed release capsule: 1 cap(s) orally once a day, Last Dose Taken:    · 	doxazosin 8 mg oral tablet: 1 tab(s) orally once a day, Last Dose Taken:    · 	benazepril 40 mg oral tablet: 1 tab(s) orally once a day, Last Dose Taken:    · 	metFORMIN 500 mg oral tablet: 1 tab(s) orally 2 times a day, Last Dose Taken:    · 	amLODIPine 10 mg oral tablet: 1 tab(s) orally once a day, Last Dose Taken:    · 	losartan-hydroCHLOROthiazide 50mg-12.5mg oral tablet: 1 tab(s) orally once a day, Last Dose Taken:    · 	atenolol 100 mg oral tablet: 0.5 tab(s) orally 2 times a day, Last Dose Taken:    · 	Klor-Con Sprinkle 8 mEq oral capsule, extended release: 1 cap(s) orally once a day  · 	Vitamin D3 1000 intl units oral capsule: 1 cap(s) orally once a day  · 	Vitamin B12 1000 mcg oral tablet: 1 tab(s) orally once a day  · 	Reglan 10 mg oral tablet:  orally 3 times a day, Last Dose Taken:      ALLERGIES and INTOLERANCES:  penicillins (Rash)  STARCH used in bedding (Hives; Rash)      SOCIAL HISTORY:  Denies ETOH, Tobacco, Illicit Drugs    FAMILY HISTORY:  Family history of coronary artery disease (Father)  Family history of cerebrovascular accident (CVA) (Father)    Vital Signs Last 24 Hrs  T(C): 37.3 (18 Sep 2018 16:24), Max: 37.6 (18 Sep 2018 13:19)  T(F): 99.1 (18 Sep 2018 16:24), Max: 99.7 (18 Sep 2018 13:19)  HR: 82 (18 Sep 2018 16:24) (82 - 88)  BP: 158/74 (18 Sep 2018 16:24) (157/89 - 158/74)  BP(mean): --  RR: 14 (18 Sep 2018 16:24) (14 - 14)  SpO2: 99% (18 Sep 2018 16:24) (93% - 99%)      PHYSICAL EXAM-  GENERAL: NAD, well-groomed, well-developed  HEAD:  Atraumatic, Normocephalic  EYES: EOMI, PERRLA, conjunctiva and sclera clear.  Mild tenderness on the outer aspect of the right ear with erythematous ear canal, and tympanic membrance  NECK: Supple, No JVD, Normal thyroid  NERVOUS SYSTEM:  Alert & Oriented X3, Motor Strength 5/5 B/L upper and lower extremities; DTRs 2+ intact and symmetric  CHEST/LUNG: Clear to percussion bilaterally; No rales, rhonchi, wheezing, or rubs  HEART: Regular rate and rhythm; No murmurs, rubs, or gallops  ABDOMEN: Soft, Nontender, Nondistended; Bowel sounds present  EXTREMITIES:  2+ Peripheral Pulses, No clubbing, cyanosis, or edema  SKIN: No rashes or lesions                              13.5   23.00 )-----------( 207      ( 18 Sep 2018 14:29 )             38.1         138  |  99  |  24<H>  ----------------------------<  141<H>  3.0<L>   |  33<H>  |  1.18    Ca    9.5      18 Sep 2018 14:29    TPro  7.3  /  Alb  3.7  /  TBili  0.9  /  DBili  x   /  AST  17  /  ALT  24  /  AlkPhos  70            Urinalysis Basic - ( 18 Sep 2018 14:29 )    Color: Yellow / Appearance: Clear / S.015 / pH: x  Gluc: x / Ketone: Trace  / Bili: Negative / Urobili: Negative mg/dL   Blood: x / Protein: 30 mg/dL / Nitrite: Negative   Leuk Esterase: Trace / RBC: 0-2 /HPF / WBC 0-2   Sq Epi: x / Non Sq Epi: Few / Bacteria: Few      Imaging Personally Reviewed:     [x ] YES  [ ] NO    Consultant(s) Notes Reviewed:  [x ] YES  [ ] NO    Care Discussed with Consultants/Other Providers [x ] YES  [ ] NO    EKG shows NSR no ischemic changes

## 2018-09-18 NOTE — ED PROVIDER NOTE - NS_ ATTENDINGSCRIBEDETAILS _ED_A_ED_FT
The scribe's documentation has been prepared under my direction and personally reviewed by me in its entirety. I confirm that the note above accurately reflects all work, treatment, procedures, and medical decision making performed by me (Dr. Hess).

## 2018-09-19 DIAGNOSIS — I10 ESSENTIAL (PRIMARY) HYPERTENSION: ICD-10-CM

## 2018-09-19 DIAGNOSIS — E11.9 TYPE 2 DIABETES MELLITUS WITHOUT COMPLICATIONS: ICD-10-CM

## 2018-09-19 DIAGNOSIS — D50.0 IRON DEFICIENCY ANEMIA SECONDARY TO BLOOD LOSS (CHRONIC): ICD-10-CM

## 2018-09-19 DIAGNOSIS — E66.01 MORBID (SEVERE) OBESITY DUE TO EXCESS CALORIES: ICD-10-CM

## 2018-09-19 DIAGNOSIS — B02.8 ZOSTER WITH OTHER COMPLICATIONS: ICD-10-CM

## 2018-09-19 DIAGNOSIS — G47.33 OBSTRUCTIVE SLEEP APNEA (ADULT) (PEDIATRIC): ICD-10-CM

## 2018-09-19 LAB
ALBUMIN SERPL ELPH-MCNC: 3 G/DL — LOW (ref 3.3–5)
ALP SERPL-CCNC: 64 U/L — SIGNIFICANT CHANGE UP (ref 30–120)
ALT FLD-CCNC: 18 U/L DA — SIGNIFICANT CHANGE UP (ref 10–60)
ANION GAP SERPL CALC-SCNC: 3 MMOL/L — LOW (ref 5–17)
AST SERPL-CCNC: 12 U/L — SIGNIFICANT CHANGE UP (ref 10–40)
BILIRUB SERPL-MCNC: 1.1 MG/DL — SIGNIFICANT CHANGE UP (ref 0.2–1.2)
BUN SERPL-MCNC: 18 MG/DL — SIGNIFICANT CHANGE UP (ref 7–23)
CALCIUM SERPL-MCNC: 8.5 MG/DL — SIGNIFICANT CHANGE UP (ref 8.4–10.5)
CHLORIDE SERPL-SCNC: 101 MMOL/L — SIGNIFICANT CHANGE UP (ref 96–108)
CO2 SERPL-SCNC: 31 MMOL/L — SIGNIFICANT CHANGE UP (ref 22–31)
CREAT SERPL-MCNC: 0.93 MG/DL — SIGNIFICANT CHANGE UP (ref 0.5–1.3)
CULTURE RESULTS: NO GROWTH — SIGNIFICANT CHANGE UP
GLUCOSE BLDC GLUCOMTR-MCNC: 126 MG/DL — HIGH (ref 70–99)
GLUCOSE BLDC GLUCOMTR-MCNC: 142 MG/DL — HIGH (ref 70–99)
GLUCOSE BLDC GLUCOMTR-MCNC: 159 MG/DL — HIGH (ref 70–99)
GLUCOSE SERPL-MCNC: 117 MG/DL — HIGH (ref 70–99)
HCT VFR BLD CALC: 33.8 % — LOW (ref 39–50)
HGB BLD-MCNC: 11.6 G/DL — LOW (ref 13–17)
LACTATE SERPL-SCNC: 1 MMOL/L — SIGNIFICANT CHANGE UP (ref 0.7–2)
MCHC RBC-ENTMCNC: 29.6 PG — SIGNIFICANT CHANGE UP (ref 27–34)
MCHC RBC-ENTMCNC: 34.3 GM/DL — SIGNIFICANT CHANGE UP (ref 32–36)
MCV RBC AUTO: 86.2 FL — SIGNIFICANT CHANGE UP (ref 80–100)
NRBC # BLD: 0 /100 WBCS — SIGNIFICANT CHANGE UP (ref 0–0)
PLATELET # BLD AUTO: 186 K/UL — SIGNIFICANT CHANGE UP (ref 150–400)
POTASSIUM SERPL-MCNC: 3.5 MMOL/L — SIGNIFICANT CHANGE UP (ref 3.5–5.3)
POTASSIUM SERPL-SCNC: 3.5 MMOL/L — SIGNIFICANT CHANGE UP (ref 3.5–5.3)
PROT SERPL-MCNC: 6.6 G/DL — SIGNIFICANT CHANGE UP (ref 6–8.3)
RBC # BLD: 3.92 M/UL — LOW (ref 4.2–5.8)
RBC # FLD: 13.2 % — SIGNIFICANT CHANGE UP (ref 10.3–14.5)
SODIUM SERPL-SCNC: 135 MMOL/L — SIGNIFICANT CHANGE UP (ref 135–145)
SPECIMEN SOURCE: SIGNIFICANT CHANGE UP
WBC # BLD: 16.83 K/UL — HIGH (ref 3.8–10.5)
WBC # FLD AUTO: 16.83 K/UL — HIGH (ref 3.8–10.5)

## 2018-09-19 PROCEDURE — 99233 SBSQ HOSP IP/OBS HIGH 50: CPT

## 2018-09-19 PROCEDURE — 74220 X-RAY XM ESOPHAGUS 1CNTRST: CPT | Mod: 26

## 2018-09-19 PROCEDURE — 71046 X-RAY EXAM CHEST 2 VIEWS: CPT | Mod: 26

## 2018-09-19 RX ORDER — OXYCODONE HYDROCHLORIDE 5 MG/1
5 TABLET ORAL ONCE
Qty: 0 | Refills: 0 | Status: DISCONTINUED | OUTPATIENT
Start: 2018-09-19 | End: 2018-09-19

## 2018-09-19 RX ORDER — METRONIDAZOLE 500 MG
500 TABLET ORAL EVERY 8 HOURS
Qty: 0 | Refills: 0 | Status: DISCONTINUED | OUTPATIENT
Start: 2018-09-19 | End: 2018-09-21

## 2018-09-19 RX ORDER — TRAMADOL HYDROCHLORIDE 50 MG/1
50 TABLET ORAL EVERY 6 HOURS
Qty: 0 | Refills: 0 | Status: DISCONTINUED | OUTPATIENT
Start: 2018-09-19 | End: 2018-09-21

## 2018-09-19 RX ORDER — METRONIDAZOLE 500 MG
TABLET ORAL
Qty: 0 | Refills: 0 | Status: DISCONTINUED | OUTPATIENT
Start: 2018-09-19 | End: 2018-09-21

## 2018-09-19 RX ORDER — TRAMADOL HYDROCHLORIDE 50 MG/1
25 TABLET ORAL EVERY 6 HOURS
Qty: 0 | Refills: 0 | Status: DISCONTINUED | OUTPATIENT
Start: 2018-09-19 | End: 2018-09-21

## 2018-09-19 RX ORDER — METRONIDAZOLE 500 MG
500 TABLET ORAL ONCE
Qty: 0 | Refills: 0 | Status: COMPLETED | OUTPATIENT
Start: 2018-09-19 | End: 2018-09-19

## 2018-09-19 RX ORDER — INSULIN LISPRO 100/ML
VIAL (ML) SUBCUTANEOUS EVERY 6 HOURS
Qty: 0 | Refills: 0 | Status: DISCONTINUED | OUTPATIENT
Start: 2018-09-19 | End: 2018-09-21

## 2018-09-19 RX ORDER — PANTOPRAZOLE SODIUM 20 MG/1
40 TABLET, DELAYED RELEASE ORAL
Qty: 0 | Refills: 0 | Status: DISCONTINUED | OUTPATIENT
Start: 2018-09-19 | End: 2018-09-21

## 2018-09-19 RX ADMIN — VALACYCLOVIR 1000 MILLIGRAM(S): 500 TABLET, FILM COATED ORAL at 13:36

## 2018-09-19 RX ADMIN — Medication 1: at 23:52

## 2018-09-19 RX ADMIN — Medication 10 MILLIGRAM(S): at 05:25

## 2018-09-19 RX ADMIN — VALACYCLOVIR 1000 MILLIGRAM(S): 500 TABLET, FILM COATED ORAL at 05:25

## 2018-09-19 RX ADMIN — VALACYCLOVIR 1000 MILLIGRAM(S): 500 TABLET, FILM COATED ORAL at 21:06

## 2018-09-19 RX ADMIN — ATENOLOL 50 MILLIGRAM(S): 25 TABLET ORAL at 05:25

## 2018-09-19 RX ADMIN — PANTOPRAZOLE SODIUM 40 MILLIGRAM(S): 20 TABLET, DELAYED RELEASE ORAL at 17:17

## 2018-09-19 RX ADMIN — Medication 100 MILLIGRAM(S): at 21:06

## 2018-09-19 RX ADMIN — SODIUM CHLORIDE 3 MILLILITER(S): 9 INJECTION INTRAMUSCULAR; INTRAVENOUS; SUBCUTANEOUS at 13:38

## 2018-09-19 RX ADMIN — Medication 100 MILLIGRAM(S): at 13:37

## 2018-09-19 RX ADMIN — ATENOLOL 50 MILLIGRAM(S): 25 TABLET ORAL at 17:17

## 2018-09-19 RX ADMIN — SODIUM CHLORIDE 3 MILLILITER(S): 9 INJECTION INTRAMUSCULAR; INTRAVENOUS; SUBCUTANEOUS at 21:16

## 2018-09-19 RX ADMIN — Medication 10 MILLIGRAM(S): at 21:06

## 2018-09-19 RX ADMIN — Medication 10 MILLIGRAM(S): at 13:36

## 2018-09-19 RX ADMIN — Medication 60 MILLIGRAM(S): at 14:34

## 2018-09-19 RX ADMIN — Medication 100 MILLIGRAM(S): at 09:07

## 2018-09-19 RX ADMIN — SODIUM CHLORIDE 3 MILLILITER(S): 9 INJECTION INTRAMUSCULAR; INTRAVENOUS; SUBCUTANEOUS at 05:23

## 2018-09-19 RX ADMIN — OXYCODONE HYDROCHLORIDE 5 MILLIGRAM(S): 5 TABLET ORAL at 14:56

## 2018-09-19 RX ADMIN — Medication 100 MILLIGRAM(S): at 14:33

## 2018-09-19 RX ADMIN — Medication 100 MILLIGRAM(S): at 05:25

## 2018-09-19 RX ADMIN — SODIUM CHLORIDE 80 MILLILITER(S): 9 INJECTION INTRAMUSCULAR; INTRAVENOUS; SUBCUTANEOUS at 09:07

## 2018-09-19 RX ADMIN — PANTOPRAZOLE SODIUM 10 MG/HR: 20 TABLET, DELAYED RELEASE ORAL at 09:19

## 2018-09-19 RX ADMIN — Medication 10 DROP(S): at 13:37

## 2018-09-19 RX ADMIN — OXYCODONE HYDROCHLORIDE 5 MILLIGRAM(S): 5 TABLET ORAL at 13:36

## 2018-09-19 NOTE — CONSULT NOTE ADULT - SUBJECTIVE AND OBJECTIVE BOX
Chief Complaint:  Patient is a 58y old  Male who presents with a chief complaint of PN (19 Sep 2018 08:54)    Allergy  Nevus comedonicus of face  Cholecystitis  Obstructive Sleep Apnea  Herniated Disc  Febrile Seizure  Morbidly Obese  Bladder Cancer  Diabetes Mellitus Type II  Hyperlipidemia  HTN (Hypertension)  S/P laparoscopic sleeve gastrectomy  S/P cystoscopy  S/P Cystoscopy  History of Arthroscopic Knee Surgery     HPI:Post sleeve gastrectomy w/ 75 lb weight loss. Had issues immediately post op w/ difficulty keeping food down  EGD done 18 months ago reviewed, narrowed lumen at genu.   pt reports nocturnal regurgitation several times weekly. now with pneumonia, suspected aspiration        penicillins (Rash)  STARCH used in bedding (Hives; Rash)      acetaminophen   Tablet .. 650 milliGRAM(s) Oral every 6 hours PRN  ATENolol  Tablet 50 milliGRAM(s) Oral two times a day  docusate sodium 100 milliGRAM(s) Oral three times a day  levoFLOXacin IVPB      metoclopramide 10 milliGRAM(s) Oral three times a day  metroNIDAZOLE  IVPB 500 milliGRAM(s) IV Intermittent every 8 hours  metroNIDAZOLE  IVPB      ofloxacin 0.3% Solution 10 Drop(s) Right Ear daily  ondansetron Injectable 4 milliGRAM(s) IV Push every 6 hours PRN  oxyCODONE    IR 5 milliGRAM(s) Oral once  pantoprazole Infusion 8 mG/Hr IV Continuous <Continuous>  senna 2 Tablet(s) Oral at bedtime PRN  sodium chloride 0.9% lock flush 3 milliLiter(s) IV Push every 8 hours  sodium chloride 0.9%. 1000 milliLiter(s) IV Continuous <Continuous>  valACYclovir 1000 milliGRAM(s) Oral every 8 hours        FAMILY HISTORY:  Family history of coronary artery disease  Family history of cerebrovascular accident (CVA)        Review of Systems:    General:  No wt loss, fevers, chills, night sweats,fatigue,   Eyes:  Good vision, no reported pain  ENT:  No sore throat, pain, runny nose, dysphagia  CV:  No pain, palpitatioins, hypo/hypertension  Resp:  some dyspnea, cough, tachypnea, wheezing  GI:  No pain, No nausea, + vomiting, No diarrhea, No constipatiion, No weight loss, No fever, No pruritis, No rectal bleeding, No tarry stools, No dysphagia,  :  No pain, bleeding, incontinence, nocturia  Muscle:  No pain, weakness  Breast:  No pain, abscess, mass, discharge  Neuro:  No weakness, tingling, memory problems  Psych:  No fatigue, insomnia, mood problems, depression  Endocrine:  No polyuria, polydypsia, cold/heat intolerance  Heme:  No petechiae, ecchymosis, easy bruisability  Skin:  No rash, tattoos, scars, edema    Relevant Family History:   n/c    Relevant Social History: no toxic habits.       Physical Exam:    Vital Signs:  Vital Signs Last 24 Hrs  T(C): 36.8 (19 Sep 2018 05:11), Max: 37.6 (18 Sep 2018 13:19)  T(F): 98.3 (19 Sep 2018 05:11), Max: 99.7 (18 Sep 2018 13:19)  HR: 73 (19 Sep 2018 05:11) (73 - 88)  BP: 148/76 (19 Sep 2018 05:11) (146/67 - 158/74)  BP(mean): --  RR: 16 (19 Sep 2018 05:11) (14 - 16)  SpO2: 96% (19 Sep 2018 05:11) (93% - 99%)  Daily Height in cm: 160.02 (18 Sep 2018 21:00)    Daily Weight in k.2 (19 Sep 2018 05:11)    General:  Appears stated age, well-groomed, well-nourished, no distress  HEENT:  NC/AT,  conjunctivae clear and pink, no thyromegaly, nodules, adenopathy, no JVD  Chest:  Full & symmetric excursion, no increased effort, breath sounds clear  Cardiovascular:  Regular rhythm, S1, S2, no murmur/rub/S3/S4, no abdominal bruit, no edema  Abdomen:  Soft, non-tender, non-distended, normoactive bowel sounds,  no masses ,no hepatosplenomeagaly, no signs of chronic liver disease  Extremities:  no cyanosis,clubbing or edema  Skin:  No rash/erythema/ecchymoses/petechiae/wounds/abscess/warm/dry  Neuro/Psych:  Alert, oriented, no asterixis, no tremor, no encephalopathy    Laboratory:                            11.6   16.83 )-----------( 186      ( 19 Sep 2018 06:55 )             33.8         135  |  101  |  18  ----------------------------<  117<H>  3.5   |  31  |  0.93    Ca    8.5      19 Sep 2018 06:55    TPro  6.6  /  Alb  3.0<L>  /  TBili  1.1  /  DBili  x   /  AST  12  /  ALT  18  /  AlkPhos  64  09-19    LIVER FUNCTIONS - ( 19 Sep 2018 06:55 )  Alb: 3.0 g/dL / Pro: 6.6 g/dL / ALK PHOS: 64 U/L / ALT: 18 U/L DA / AST: 12 U/L / GGT: x             Urinalysis Basic - ( 18 Sep 2018 14:29 )    Color: Yellow / Appearance: Clear / S.015 / pH: x  Gluc: x / Ketone: Trace  / Bili: Negative / Urobili: Negative mg/dL   Blood: x / Protein: 30 mg/dL / Nitrite: Negative   Leuk Esterase: Trace / RBC: 0-2 /HPF / WBC 0-2   Sq Epi: x / Non Sq Epi: Few / Bacteria: Few      Amylase Serum--      Lipase [id number]       Ammonia--    Imaging:  < from: Xray Esophagram (18 @ 10:13) >  Impression:   Status post sleeve gastrectomy. No evidence of obstruction or   extravasation. No gastroesophageal reflux was noted during this exam.    < end of copied text >      Assessment:      Plan:

## 2018-09-19 NOTE — CONSULT NOTE ADULT - CONSULT REASON
regurgitation, aspiration
59 yo M s/p bariatric surgery with complaints of exacerbation of GERD symptoms/ chest pain / episodic vomiting since this am.
Pneumonia  Aspiration  Anemia  Sleep apnea
Possible Aspiration Pn and Zoster
medical consult

## 2018-09-19 NOTE — PROGRESS NOTE ADULT - SUBJECTIVE AND OBJECTIVE BOX
BARIATRIC SURGERY     Surgeon: Mert         Subjective:    Patient is 59 yo male with hx of s/p lap sleeve gastrectomy with intra op EGD and hiatal hernia repair - 17- Dr. Garcia  presented to [place] ED -N/V, chest pain and reflex symptoms that yesterday morning.  Pt reports with worsening nocturnal reflux symptoms - and regurgitation of food-  > 3  months.  Elevated WBC - 23 CT of chest indicated left upper left lower lobe pneumonia. Currently on an antibiotic regimen and O2 via nasal cannula. Pt continues to report feeling fatigue malaise , chest discomfort and right ear pain.   Possible herpes zoster right side of face - onset of new lesions on nose this am.- Currently on antivirals. No nausea or vomiting.  Denies any shortness of breath or increase in chest discomfort this am. No nausea or vomiting.     LABS:                        11.6   16.83 )-----------( 186      ( 19 Sep 2018 06:55 )             33.8         135  |  101  |  18  ----------------------------<  117<H>  3.5   |  31  |  0.93    Ca    8.5      19 Sep 2018 06:55    TPro  6.6  /  Alb  3.0<L>  /  TBili  1.1  /  DBili  x   /  AST  12  /  ALT  18  /  AlkPhos  64            Urinalysis Basic - ( 18 Sep 2018 14:29 )    Color: Yellow / Appearance: Clear / S.015 / pH: x  Gluc: x / Ketone: Trace  / Bili: Negative / Urobili: Negative mg/dL   Blood: x / Protein: 30 mg/dL / Nitrite: Negative   Leuk Esterase: Trace / RBC: 0-2 /HPF / WBC 0-2   Sq Epi: x / Non Sq Epi: Few / Bacteria: Few      LIVER FUNCTIONS - ( 19 Sep 2018 06:55 )  Alb: 3.0 g/dL / Pro: 6.6 g/dL / ALK PHOS: 64 U/L / ALT: 18 U/L DA / AST: 12 U/L / GGT: x               Vital Signs Last 24 Hrs  T(C): 36.8 (19 Sep 2018 05:11), Max: 37.6 (18 Sep 2018 13:19)  T(F): 98.3 (19 Sep 2018 05:11), Max: 99.7 (18 Sep 2018 13:19)  HR: 73 (19 Sep 2018 05:11) (73 - 88)  BP: 148/76 (19 Sep 2018 05:11) (146/67 - 158/74)  BP(mean): --  RR: 16 (19 Sep 2018 05:11) (14 - 16)  SpO2: 96% (19 Sep 2018 05:11) (93% - 99%)    - @ 07:01  -   @ 07:00  --------------------------------------------------------  IN: 1070 mL / OUT: 2 mL / NET: 1068 mL        Physical Exam:  General: Alert & Oriented x 3.  NAD, resting comfortably in bed.    Abdominal: Soft - Non tender - No swelling noted. Incision site clean / dry /intact. Normoactive Bowel Sounds.  Extremities: No swelling/ edema / erythema noted bilateral upper / lower extremities.  Neuro: Motor / Sensory function grossly intact bilateral lower/ upper extremities.          Assessment:  59 yo male with hx of s/p lap sleeve gastrectomy with intra op EGD and hiatal hernia repair - 17- Dr. Garcia  presented to [place] ED -N/V, chest pain and reflex symptoms that yesterday morning.  Pt is hemodynamically stable. Decrease in WBC this am to 16. Afebrile. History of nocturnal  reflux symptoms - and regurgitation of food-  > 3  months.     Plan:  Seen by pulmonary/ hospitalist/ infectious disease.    Video Esophagram to be performed this am with lose dose barium as recommended by radiologist.   Continue IV hydration / IV antibiotic regimen.   GI consult - consider upper EGD   Will consider Swallow Eval - Speech as per pulmonary.   Cont  OOB / ambulation on floor / incentive spirometry.  Continue O2  therapy via N/C  Plan to repeat Chest x-ray tomorrow am.   Will repeat CBC CMP in am  Dr. Painting  saw pt. BARIATRIC SURGERY     Surgeon: Mert         Subjective:    Patient is 59 yo male with hx of s/p lap sleeve gastrectomy with intra op EGD and hiatal hernia repair - 17- Dr. Garcia  presented to [place] ED -N/V, chest pain and reflex symptoms that yesterday morning.  Pt reports with worsening nocturnal reflux symptoms - and regurgitation of food-  > 3  months.  Elevated WBC - 23 CT of chest indicated left upper left lower lobe pneumonia. Currently on an antibiotic regimen and O2 via nasal cannula. Pt continues to report feeling fatigue malaise , chest discomfort and right ear pain.   Possible herpes zoster right side of face - onset of new laparoscopic adjustable gastric bandesions on nose this am.- Currently on antivirals. No nausea or vomiting.  Denies any shortness of breath or increase in chest discomfort this am. No nausea or vomiting.     LABS:                        11.6   16.83 )-----------( 186      ( 19 Sep 2018 06:55 )             33.8         135  |  101  |  18  ----------------------------<  117<H>  3.5   |  31  |  0.93    Ca    8.5      19 Sep 2018 06:55    TPro  6.6  /  Alb  3.0<L>  /  TBili  1.1  /  DBili  x   /  AST  12  /  ALT  18  /  AlkPhos  64            Urinalysis Basic - ( 18 Sep 2018 14:29 )    Color: Yellow / Appearance: Clear / S.015 / pH: x  Gluc: x / Ketone: Trace  / Bili: Negative / Urobili: Negative mg/dL   Blood: x / Protein: 30 mg/dL / Nitrite: Negative   Leuk Esterase: Trace / RBC: 0-2 /HPF / WBC 0-2   Sq Epi: x / Non Sq Epi: Few / Bacteria: Few      LIVER FUNCTIONS - ( 19 Sep 2018 06:55 )  Alb: 3.0 g/dL / Pro: 6.6 g/dL / ALK PHOS: 64 U/L / ALT: 18 U/L DA / AST: 12 U/L / GGT: x               Vital Signs Last 24 Hrs  T(C): 36.8 (19 Sep 2018 05:11), Max: 37.6 (18 Sep 2018 13:19)  T(F): 98.3 (19 Sep 2018 05:11), Max: 99.7 (18 Sep 2018 13:19)  HR: 73 (19 Sep 2018 05:11) (73 - 88)  BP: 148/76 (19 Sep 2018 05:11) (146/67 - 158/74)  BP(mean): --  RR: 16 (19 Sep 2018 05:11) (14 - 16)  SpO2: 96% (19 Sep 2018 05:11) (93% - 99%)     @ 07:01  -   @ 07:00  --------------------------------------------------------  IN: 1070 mL / OUT: 2 mL / NET: 1068 mL        Physical Exam:  General: Alert & Oriented x 3.  NAD, resting comfortably in bed.    Abdominal: Soft - Non tender - No swelling noted. Incision site clean / dry /intact. Normoactive Bowel Sounds.  Extremities: No swelling/ edema / erythema noted bilateral upper / lower extremities.  Neuro: Motor / Sensory function grossly intact bilateral lower/ upper extremities.          Assessment:  59 yo male with hx of s/p lap sleeve gastrectomy with intra op EGD and hiatal hernia repair - 17- Dr. Garcia  presented to [place] ED -N/V, chest pain and reflex symptoms that yesterday morning.  Pt is hemodynamically stable. Decrease in WBC this am to 16. Afebrile. History of nocturnal  reflux symptoms - and regurgitation of food-  > 3  months.     Plan:  Seen by pulmonary/ hospitalist/ infectious disease.    Video Esophagram to be performed this am with lose dose barium as recommended by radiologist.   Continue IV hydration / IV antibiotic regimen.   GI consult - consider upper EGD   Will consider Swallow Eval - Speech as per pulmonary.   Cont  OOB / ambulation on floor / incentive spirometry.  Continue O2  therapy via N/C  Plan to repeat Chest x-ray tomorrow am.   Will repeat CBC CMP in am  Dr. Painting  saw pt.

## 2018-09-19 NOTE — CONSULT NOTE ADULT - ASSESSMENT
Pt with recurrent regurgitation, now presents with pneumonia, most likely aspiration.  Has JOSÉ but noncompliant with cpap.  Possible zoster face.  Anemia.

## 2018-09-19 NOTE — CONSULT NOTE ADULT - ASSESSMENT
58 m a/w pneumonia, leukocytosis and SOB    Post sleeve gastrectomy w/ freq	uent nocturnal regurgitation  esophagram w/ reflux  will cont to follow, treat pneumonia per medicine  will review w/ bariatric surgery, consider EGD w/ dilation in area of narrowing in area of genu   would use PPI daily  cannot eat for 2 hours before laying down to sleep  follow swallow eval

## 2018-09-19 NOTE — CONSULT NOTE ADULT - SUBJECTIVE AND OBJECTIVE BOX
Infectious Diseases Consult by Tim Dill MD    Reason for Consult :nausea and vomiting    HPI:  Patient is 57 yo male with hx of s/p lap sleeve gastrectomy with intra op EGD and hiatal hernia repair - 17- Dr. Garcia  presenting with N/V, and reflex symptoms that started this morning.  Pt reports with worsening nocturnal reflux symptoms - and regurgitation of food-  > 3  months. Pt states he started vomiting multiple times this morning ~ 8 am. Denies any fever, chills. CP, SOB, palpitation, Diarrhea, Blurry vision, decrease in vision, abdominal pain, Numbness, weakness, headache, dizziness, or neck pain.  Patient noted to have rash on the right side of the face that has been going on for the past several days.  rash is not painful but he has Right ear pain . Hx of chicken pox as child.   Shingles/ herpes zoster on face.     Patient reports that he finished a course of doxycycline for UTI.  Urine culture from  shows ECOLI.  He has severe allergy to PCN so his PMD put him on doxycycline. He has hx of bladder cancer and has been in remission ,  He has not seen a urologist in long time as his urologist retired     In ER his WBC was 91405, CXR was NAPD but ct chest showed extensive MARGARETH and LLL infiltrate . he got one dose of Levaquin and flagyl in ER       Past Medical & Surgical Hx:  PAST MEDICAL & SURGICAL HISTORY:  Allergy: STARCH IN SHEETS AND LINENS  Nevus comedonicus of face: removed 25 years ago, via flap  Cholecystitis  Obstructive Sleep Apnea: pt uses CPAP at 12 cm, uses nasal mask at home  Herniated Disc: lumbar-  PT CANNOT LIE ON BACK FOR A LONG TIME.  WANTS TO BE POSITIONED ON SIDE WITH 2 TOWELS UNDER HEAD  Febrile Seizure: during childhood  Morbidly Obese  Bladder Cancer: since , tumor removed and now reportedly in remission  Diabetes Mellitus Type II  HTN (Hypertension)  S/P laparoscopic sleeve gastrectomy  S/P cystoscopy: - removal of bladder tumor   S/P Cystoscopy: every year;  History of Arthroscopic Knee Surgery: right      Social History-- On Disability from herniated discs and knee injury   EtOH: denies   Tobacco: denies   Drug Use: denies       FAMILY HISTORY:  Family history of coronary artery disease  Family history of cerebrovascular accident (CVA)      Allergies    penicillins (Rash)  STARCH used in bedding (Hives; Rash)    Intolerances        Home/ Out patient  Medications :  Home Medications:  atenolol 100 mg oral tablet: 0.5 tab(s) orally 2 times a day (18 Sep 2018 13:23)  Reglan 10 mg oral tablet:  orally 3 times a day (18 Sep 2018 13:23)  Vitamin B12 1000 mcg oral tablet: 1 tab(s) orally once a day (18 Sep 2018 13:23)  Vitamin D3 1000 intl units oral capsule: 1 cap(s) orally once a day (18 Sep 2018 13:23)      Current Inpatient Medications :    ANTIBIOTICS:   valACYclovir 1000 milliGRAM(s) Oral every 8 hours      OTHER RELEVANT MEDICATIONS :  acetaminophen   Tablet .. 650 milliGRAM(s) Oral every 6 hours PRN  ATENolol  Tablet 50 milliGRAM(s) Oral two times a day  docusate sodium 100 milliGRAM(s) Oral three times a day  metoclopramide 10 milliGRAM(s) Oral three times a day  ofloxacin 0.3% Solution 10 Drop(s) Right Ear daily  ondansetron Injectable 4 milliGRAM(s) IV Push every 6 hours PRN  pantoprazole Infusion 8 mG/Hr IV Continuous <Continuous>  senna 2 Tablet(s) Oral at bedtime PRN  sodium chloride 0.9% lock flush 3 milliLiter(s) IV Push every 8 hours  sodium chloride 0.9%. 1000 milliLiter(s) IV Continuous <Continuous>      ROS:  CONSTITUTIONAL:  Negative fever or chills, feels well, good appetite  EYES:  Negative  blurry vision or double vision  CARDIOVASCULAR:  Negative for chest pain or palpitations  RESPIRATORY:  Positive for cough, wheezing, or SOB   GASTROINTESTINAL:  Positive  for nausea, vomiting, No diarrhea, constipation, or abdominal pain  GENITOURINARY:  Negative frequency, urgency , dysuria or hematuria   NEUROLOGIC:  No headache, confusion, dizziness, lightheadedness  All other systems were reviewed and are negative          I&O's Detail    18 Sep 2018 07:01  -  19 Sep 2018 07:00  --------------------------------------------------------  IN:    pantoprazole Infusion: 110 mL    sodium chloride 0.9%.: 960 mL  Total IN: 1070 mL    OUT:    Voided: 2 mL  Total OUT: 2 mL    Total NET: 1068 mL          Physical Exam:  Vital Signs Last 24 Hrs  T(C): 36.8 (19 Sep 2018 05:11), Max: 37.6 (18 Sep 2018 13:19)  T(F): 98.3 (19 Sep 2018 05:11), Max: 99.7 (18 Sep 2018 13:19)  HR: 73 (19 Sep 2018 05:11) (73 - 88)  BP: 148/76 (19 Sep 2018 05:11) (146/67 - 158/74)  BP(mean): --  RR: 16 (19 Sep 2018 05:11) (14 - 16)  SpO2: 96% (19 Sep 2018 05:11) (93% - 99%)  Height (cm): 160.02 ( @ 21:00)  Weight (kg): 102 ( @ 21:00)  BMI (kg/m2): 39.8 ( @ 21:00)  BSA (m2): 2.03 ( @ 21:00)    General: Morbidly obese male , in no acute distress  Eyes: sclera anicteric, pupils equal and reactive to light  ENMT: buccal mucosa moist, pharynx not injected  Neck: supple, trachea midline  Lungs: coarse breath sounds left side , no wheeze/rhonchi  Cardiovascular: regular rate and rhythm, S1 S2  Abdomen: soft, nontender, no organomegaly present, bowel sounds normal  Neurological:  alert and oriented x3, Cranial Nerves II-XII grossly intact  Skin: Vesicular rash on right side of face no increased ecchymosis/petechiae/purpura  Lymph Nodes: no palpable cervical/supraclavicular lymph nodes enlargements  Extremities: no cyanosis/clubbing/edema    Labs:                          11.6   16.83 )-----------( 186      ( 19 Sep 2018 06:55 )             33.8                    11.6   16.83  )----------(  186       ( 19 Sep 2018 06:55 )               33.8      135    |  101    |  18     ----------------------------<  117        ( 19 Sep 2018 06:55 )  3.5     |  31     |  0.93     Ca    8.5        ( 19 Sep 2018 06:55 )    TPro  6.6    /  Alb  3.0    /  TBili  1.1    /  DBili  x      /  AST  12     /  ALT  18     /  AlkPhos  64     ( 19 Sep 2018 06:55 )    LIVER FUNCTIONS - ( 19 Sep 2018 06:55 )  Alb: 3.0 g/dL / Pro: 6.6 g/dL / ALK PHOS: 64 U/L / ALT: 18 U/L DA / AST: 12 U/L / GGT: x           CARDIAC MARKERS ( 18 Sep 2018 18:51 )  .013 ng/mL / x     / x     / x     / x          Urinalysis Basic - ( 18 Sep 2018 14:29 )    Color: Yellow / Appearance: Clear / S.015 / pH: x  Gluc: x / Ketone: Trace  / Bili: Negative / Urobili: Negative mg/dL   Blood: x / Protein: 30 mg/dL / Nitrite: Negative   Leuk Esterase: Trace / RBC: 0-2 /HPF / WBC 0-2   Sq Epi: x / Non Sq Epi: Few / Bacteria: Few        RECENT CULTURES:    RADIOLOGY & ADDITIONAL STUDIES:  CT Chest No Cont (18 @ 19:40) >  Extensive airspace infiltrates in the left upper and lower lobe   consistent with pneumonia. Please image to resolution.  No pleural or pericardial effusion.  Central airways patent. No mediastinal adenopathy. Lack of IV contrast   limits evaluation of the may. Nonaneurysmal thoracic aorta.  Normal heart size.  No acute abnormality upper abdomen. Status post gastric surgery. Right   renal cyst.  Advanced degenerative spondylosis dorsal spine    Impression:    Left upper and left lower lobe pneumonia. Please image to resolution.     Xray Chest 1 View AP/PA (18 @ 16:11) >  INTERPRETATION:  AP semierect chest on 2018 at 3:59 PM.   Patient has vomiting and concern is aspiration. COMPARISON: None   available.    The heart is magnified by technique.    The lung fields and pleural surfaces are unremarkable.     IMPRESSION: No infiltrate.      Assessment :   57 yo male with hx of s/p lap sleeve gastrectomy with intra op EGD and hiatal hernia repair - 17- Dr. Garcia  presenting with N/V, and reflex symptoms that started this morning.  Pt reports with worsening nocturnal reflux symptoms  and regurgitation of food-  > 3  months, He likely has recurrent aspiration pneumonia . He has hx of severe GERD , last EGD was in 2018     he has pain and vesicles on right side of face likely has Zoster with post herpetic neuralgia     He has hx of bladder cancer so he he has recurrent hematuria he should have urology follow up       Plan :   - will restart Levaquin 750 mg daily with Flagyl 500 mg q 8   - will change to Contact precautions for H zoster , complete 7 dyas of valtrex. may benefit from use of Neurontin  - he is for VES today   - GI to see patient may need EGD  - trend CBC  - aspiration precautions     Discussed case with Dr. Mert Arita     Continue with present regime .  Appropriate use of antibiotics and adverse effects reviewed.      I have discussed the above plan of care with patient in detail. He expressed understanding of the treatment plan . Risks, benefits and alternatives discussed in detail. I have asked if he has any questions or concerns and appropriately addressed them to the best of my ability .       > 55 minutes spent in direct patient care reviewing  the notes, lab data/ imaging , discussion with multidisciplinary team. All questions were addressed and answered to the best of my capacity .    Thank you for allowing me to participate in the care of your patient .      Tim Dill MD  [phone]

## 2018-09-19 NOTE — PROGRESS NOTE ADULT - SUBJECTIVE AND OBJECTIVE BOX
INTERVAL HPI/OVERNIGHT EVENTS:   Patient seen and examined.  Still with nausea, though better than yesterday.  Notes significant right ear pain.  No changes in vision.  Notes dry cough for last 4-5 days with chills.  No fevers, sweats, dizziness, HA,   cp, palpitations, sob,  diarrhea, abd pain, dysuria, focal weakness, or calf pain.      REVIEW OF SYSTEMS:  See HPI,  all others negative    PHYSICAL EXAM:  Vital Signs Last 24 Hrs  T(C): 36.8 (19 Sep 2018 05:11), Max: 37.3 (18 Sep 2018 16:24)  T(F): 98.3 (19 Sep 2018 05:11), Max: 99.1 (18 Sep 2018 16:24)  HR: 73 (19 Sep 2018 05:11) (73 - 82)  BP: 148/76 (19 Sep 2018 05:11) (146/67 - 158/74)  BP(mean): --  RR: 16 (19 Sep 2018 05:11) (14 - 16)  SpO2: 96% (19 Sep 2018 05:11) (96% - 99%)    GENERAL: NAD, well-groomed, well-developed, awake, alert, oriented x 3, fluent and coherent speech  HEAD:  Atraumatic, Normocephalic  EYES: EOMI, PERRLA, conjunctiva and sclera clear  ENMT: No tonsillar erythema, exudates, or enlargement; Moist mucous membranes, Good dentition, No lesions             Left Ear canal with cerumen, Right Ear Canal with erythema, tenderness when tugging pinna, vesicles with erythematous base in ear canal	  NECK: Supple, No JVD, No Cervical LAD, No thyromegaly, No thyroid nodules felt  NERVOUS SYSTEM:  Good concentration; Moving all 4 extremities; No gross sensory deficits, No facial droop/paralysis  CHEST WALL: No masses  CHEST/LUNG: Clear to auscultation bilaterally; No rales, rhonchi, wheezing, or rubs  HEART: Regular rate and rhythm; No murmurs, rubs, or gallops  ABDOMEN: Soft, Nontender, obese, Bowel sounds present, No palpable masses or organomegaly, No bruits  EXTREMITIES:  2+ Peripheral Pulses, No clubbing, cyanosis, or edema  LYMPH: No lymphadenopathy   SKIN: scattered papules starting in front of right ear, along upper cheek, no lesions on forehead or lower face    LABS:                        11.6   16.83 )-----------( 186      ( 19 Sep 2018 06:55 )             33.8     19 Sep 2018 06:55    135    |  101    |  18     ----------------------------<  117    3.5     |  31     |  0.93     Ca    8.5        19 Sep 2018 06:55    TPro  6.6    /  Alb  3.0    /  TBili  1.1    /  DBili  x      /  AST  12     /  ALT  18     /  AlkPhos  64     19 Sep 2018 06:55      Urinalysis Basic - ( 18 Sep 2018 14:29 )    Color: Yellow / Appearance: Clear / S.015 / pH: x  Gluc: x / Ketone: Trace  / Bili: Negative / Urobili: Negative mg/dL   Blood: x / Protein: 30 mg/dL / Nitrite: Negative   Leuk Esterase: Trace / RBC: 0-2 /HPF / WBC 0-2   Sq Epi: x / Non Sq Epi: Few / Bacteria: Few    < from: CT Chest No Cont (18 @ 19:40) >  Impression:    Left upper and left lower lobe pneumonia. Please image to resolution.    < end of copied text >

## 2018-09-19 NOTE — CONSULT NOTE ADULT - SUBJECTIVE AND OBJECTIVE BOX
PULMONARY/CRITICAL CARE        Pt well known to me from prior admission.    BRIEF HOSPITAL COURSE: ***Patient is 57 yo male with hx of s/p lap sleeve gastrectomy with intra op EGD and hiatal hernia repair - 17- Dr. Garcia  presenting with N/V, and reflux symptoms that started yesterday.  Pt reports with worsening nocturnal reflux symptoms - and regurgitation of food-  > 3  months. Notices food in nose at nite.   Had severe cough starting yesterday am. Pt states he started vomiting multiple times yesterday morning ~ 8 am. Denies any fever, chills. CP, SOB, palpitation, Diarrhea, Blurry vision, decrease in vision, abdominal pain, Numbness, weakness, headache, dizziness, or neck pain.  Patient noted to have rash on the right side of the face that has been going on for the past several days.  rash is not painful but he has Right ear pain . Hx of chicken pox as child.   Shingles/ herpes zoster on face.     Patient reports that he finished a course of doxycycline for UTI.  Urine culture from  shows ECOLI.  He has severe allergy to PCN so his PMD put him on doxycycline. He has hx of bladder cancer and has been in remission ,  He has not seen a urologist in long time as his urologist retired     In ER his WBC was 70330, CXR was NAPD but ct chest showed extensive MARGARETH and LLL infiltrate . 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  Herniated Disc: lumbar-  PT CANNOT LIE ON BACK FOR A LONG TIME.  WANTS TO BE POSITIONED ON SIDE WITH 2 TOWELS UNDER HEAD  Febrile Seizure: during childhood  Morbidly Obese  Bladder Cancer: since , tumor removed and now reportedly in remission  Diabetes Mellitus Type II  HTN (Hypertension)  S/P laparoscopic sleeve gastrectomy  S/P cystoscopy: - removal of bladder tumor   S/P Cystoscopy: every year;  History of Arthroscopic Knee Surgery: right    Allergies    penicillins (Rash)  STARCH used in bedding (Hives; Rash)    Intolerances      FAMILY HISTORY and SOCIAL: no cigs, etoh.  Family history of coronary artery disease  Family history of cerebrovascular accident (CVA)      Review of Systems:  CONSTITUTIONAL: No fever, chills, or fatigue  EYES: No eye pain, visual disturbances, or discharge  ENMT:  No difficulty hearing, tinnitus, vertigo; No sinus or throat pain  NECK: No pain or stiffness  RESPIRATORY: has nonprod cough, no wheezing, chills or hemoptysis; No shortness of breath  CARDIOVASCULAR: No chest pain, palpitations, dizziness, or leg swelling  GASTROINTESTINAL: No abdominal or epigastric pain. had  nausea, vomiting, No  hematemesis; No diarrhea or constipation. No melena or hematochezia.  GENITOURINARY: No dysuria, frequency, hematuria, or incontinence  NEUROLOGICAL: No headaches, memory loss, loss of strength, numbness, or tremors  SKIN: No itching, burning, rashes, or lesions   MUSCULOSKELETAL: No joint pain or swelling; No muscle, back, or extremity pain  PSYCHIATRIC: No depression, anxiety, mood swings, or difficulty sleeping      Medications:  levoFLOXacin IVPB      levoFLOXacin IVPB 750 milliGRAM(s) IV Intermittent once  metroNIDAZOLE  IVPB 500 milliGRAM(s) IV Intermittent every 8 hours  metroNIDAZOLE  IVPB 500 milliGRAM(s) IV Intermittent once  metroNIDAZOLE  IVPB      valACYclovir 1000 milliGRAM(s) Oral every 8 hours    ATENolol  Tablet 50 milliGRAM(s) Oral two times a day      acetaminophen   Tablet .. 650 milliGRAM(s) Oral every 6 hours PRN  ondansetron Injectable 4 milliGRAM(s) IV Push every 6 hours PRN        docusate sodium 100 milliGRAM(s) Oral three times a day  metoclopramide 10 milliGRAM(s) Oral three times a day  pantoprazole Infusion 8 mG/Hr IV Continuous <Continuous>  senna 2 Tablet(s) Oral at bedtime PRN        sodium chloride 0.9% lock flush 3 milliLiter(s) IV Push every 8 hours  sodium chloride 0.9%. 1000 milliLiter(s) IV Continuous <Continuous>      ofloxacin 0.3% Solution 10 Drop(s) Right Ear daily            ICU Vital Signs Last 24 Hrs  T(C): 36.8 (19 Sep 2018 05:11), Max: 37.6 (18 Sep 2018 13:19)  T(F): 98.3 (19 Sep 2018 05:11), Max: 99.7 (18 Sep 2018 13:19)  HR: 73 (19 Sep 2018 05:11) (73 - 88)  BP: 148/76 (19 Sep 2018 05:11) (146/67 - 158/74)  BP(mean): --  ABP: --  ABP(mean): --  RR: 16 (19 Sep 2018 05:11) (14 - 16)  SpO2: 96% (19 Sep 2018 05:11) (93% - 99%)    Vital Signs Last 24 Hrs  T(C): 36.8 (19 Sep 2018 05:11), Max: 37.6 (18 Sep 2018 13:19)  T(F): 98.3 (19 Sep 2018 05:11), Max: 99.7 (18 Sep 2018 13:19)  HR: 73 (19 Sep 2018 05:11) (73 - 88)  BP: 148/76 (19 Sep 2018 05:11) (146/67 - 158/74)  BP(mean): --  RR: 16 (19 Sep 2018 05:11) (14 - 16)  SpO2: 96% (19 Sep 2018 05:11) (93% - 99%)        I&O's Detail    18 Sep 2018 07:01  -  19 Sep 2018 07:00  --------------------------------------------------------  IN:    pantoprazole Infusion: 110 mL    sodium chloride 0.9%.: 960 mL  Total IN: 1070 mL    OUT:    Voided: 2 mL  Total OUT: 2 mL    Total NET: 1068 mL            LABS:                        11.6   16.83 )-----------( 186      ( 19 Sep 2018 06:55 )             33.8     -19    135  |  101  |  18  ----------------------------<  117<H>  3.5   |  31  |  0.93    Ca    8.5      19 Sep 2018 06:55    TPro  6.6  /  Alb  3.0<L>  /  TBili  1.1  /  DBili  x   /  AST  12  /  ALT  18  /  AlkPhos  64  -19      CARDIAC MARKERS ( 18 Sep 2018 18:51 )  .013 ng/mL / x     / x     / x     / x          CAPILLARY BLOOD GLUCOSE          Urinalysis Basic - ( 18 Sep 2018 14:29 )    Color: Yellow / Appearance: Clear / S.015 / pH: x  Gluc: x / Ketone: Trace  / Bili: Negative / Urobili: Negative mg/dL   Blood: x / Protein: 30 mg/dL / Nitrite: Negative   Leuk Esterase: Trace / RBC: 0-2 /HPF / WBC 0-2   Sq Epi: x / Non Sq Epi: Few / Bacteria: Few      CULTURES:      Physical Examination:    General: No acute distress.  Obese male    HEENT: Pupils equal, reactive to light.  Symmetric. Rash right upper face    PULM: Few rhonchi, rales right base. Good excursion.    CVS: Regular rate and rhythm, no murmurs, rubs, or gallops    ABD: Soft, nondistended, nontender, normoactive bowel sounds, no masses    EXT: No edema, nontender calves    SKIN: Warm and well perfused, no rashes noted.    NEURO: Alert, oriented, interactive, nonfocal    RADIOLOGY: ***  < from: CT Chest No Cont (18 @ 19:40) >    EXAM:  CT CHEST                                  PROCEDURE DATE:  2018          INTERPRETATION:  History: Cough.    Noncontrast chest CT.  Extensive airspace infiltrates in the left upper and lower lobe   consistent with pneumonia. Please image to resolution.  No pleural or pericardial effusion.  Central airways patent. No mediastinal adenopathy. Lack of IV contrast   limits evaluation of the may. Nonaneurysmal thoracic aorta.  Normal heart size.  No acute abnormality upper abdomen. Status post gastric surgery. Right   renal cyst.  Advanced degenerative spondylosis dorsal spine    Impression:    Left upper and left lower lobe pneumonia. Please image to resolution.    < end of copied text >    CRITICAL CARE TIME SPENT: ***

## 2018-09-19 NOTE — SWALLOW BEDSIDE ASSESSMENT ADULT - COMMENTS
Pt A+Ox4, cooperative. Pt has a laparoscopic sleeve gastrectomy.  Pt has been c/o regurgitation of food. Pt admitted with pna, suspected aspiration pna. Pt had esophagram today. As per GI, pt may require dilation. Pt's swallowing is intact as he is safely tolerating all consistencies without any s/s of aspiration. There was no regurgitation during assessment. Pt denied odynophagia and pharyngeal stasis. Recommend dysphagia 3 soft solids with thin liquids if cleared by GI. Discussed with pt, RN, GI. No regurgitation noted during assessment. Pt denied pharyngeal stasis.

## 2018-09-20 ENCOUNTER — TRANSCRIPTION ENCOUNTER (OUTPATIENT)
Age: 58
End: 2018-09-20

## 2018-09-20 DIAGNOSIS — J69.0 PNEUMONITIS DUE TO INHALATION OF FOOD AND VOMIT: ICD-10-CM

## 2018-09-20 LAB
ANION GAP SERPL CALC-SCNC: 6 MMOL/L — SIGNIFICANT CHANGE UP (ref 5–17)
BASOPHILS # BLD AUTO: 0.01 K/UL — SIGNIFICANT CHANGE UP (ref 0–0.2)
BASOPHILS NFR BLD AUTO: 0.1 % — SIGNIFICANT CHANGE UP (ref 0–2)
BUN SERPL-MCNC: 15 MG/DL — SIGNIFICANT CHANGE UP (ref 7–23)
CALCIUM SERPL-MCNC: 9 MG/DL — SIGNIFICANT CHANGE UP (ref 8.4–10.5)
CHLORIDE SERPL-SCNC: 102 MMOL/L — SIGNIFICANT CHANGE UP (ref 96–108)
CO2 SERPL-SCNC: 28 MMOL/L — SIGNIFICANT CHANGE UP (ref 22–31)
CREAT SERPL-MCNC: 0.89 MG/DL — SIGNIFICANT CHANGE UP (ref 0.5–1.3)
EOSINOPHIL # BLD AUTO: 0.01 K/UL — SIGNIFICANT CHANGE UP (ref 0–0.5)
EOSINOPHIL NFR BLD AUTO: 0.1 % — SIGNIFICANT CHANGE UP (ref 0–6)
FOLATE SERPL-MCNC: 12.7 NG/ML — SIGNIFICANT CHANGE UP
GLUCOSE BLDC GLUCOMTR-MCNC: 135 MG/DL — HIGH (ref 70–99)
GLUCOSE BLDC GLUCOMTR-MCNC: 143 MG/DL — HIGH (ref 70–99)
GLUCOSE BLDC GLUCOMTR-MCNC: 148 MG/DL — HIGH (ref 70–99)
GLUCOSE SERPL-MCNC: 143 MG/DL — HIGH (ref 70–99)
HCT VFR BLD CALC: 33.3 % — LOW (ref 39–50)
HGB BLD-MCNC: 11.5 G/DL — LOW (ref 13–17)
IMM GRANULOCYTES NFR BLD AUTO: 0.5 % — SIGNIFICANT CHANGE UP (ref 0–1.5)
IRON SATN MFR SERPL: 41 UG/DL — LOW (ref 45–165)
LYMPHOCYTES # BLD AUTO: 0.77 K/UL — LOW (ref 1–3.3)
LYMPHOCYTES # BLD AUTO: 5.8 % — LOW (ref 13–44)
MCHC RBC-ENTMCNC: 29.3 PG — SIGNIFICANT CHANGE UP (ref 27–34)
MCHC RBC-ENTMCNC: 34.5 GM/DL — SIGNIFICANT CHANGE UP (ref 32–36)
MCV RBC AUTO: 84.9 FL — SIGNIFICANT CHANGE UP (ref 80–100)
MONOCYTES # BLD AUTO: 0.71 K/UL — SIGNIFICANT CHANGE UP (ref 0–0.9)
MONOCYTES NFR BLD AUTO: 5.3 % — SIGNIFICANT CHANGE UP (ref 2–14)
NEUTROPHILS # BLD AUTO: 11.74 K/UL — HIGH (ref 1.8–7.4)
NEUTROPHILS NFR BLD AUTO: 88.2 % — HIGH (ref 43–77)
NRBC # BLD: 0 /100 WBCS — SIGNIFICANT CHANGE UP (ref 0–0)
PLATELET # BLD AUTO: 200 K/UL — SIGNIFICANT CHANGE UP (ref 150–400)
POTASSIUM SERPL-MCNC: 3.7 MMOL/L — SIGNIFICANT CHANGE UP (ref 3.5–5.3)
POTASSIUM SERPL-SCNC: 3.7 MMOL/L — SIGNIFICANT CHANGE UP (ref 3.5–5.3)
RBC # BLD: 3.92 M/UL — LOW (ref 4.2–5.8)
RBC # FLD: 13 % — SIGNIFICANT CHANGE UP (ref 10.3–14.5)
SODIUM SERPL-SCNC: 136 MMOL/L — SIGNIFICANT CHANGE UP (ref 135–145)
VIT B12 SERPL-MCNC: 1853 PG/ML — HIGH (ref 232–1245)
WBC # BLD: 13.3 K/UL — HIGH (ref 3.8–10.5)
WBC # FLD AUTO: 13.3 K/UL — HIGH (ref 3.8–10.5)

## 2018-09-20 PROCEDURE — 99233 SBSQ HOSP IP/OBS HIGH 50: CPT

## 2018-09-20 RX ADMIN — ATENOLOL 50 MILLIGRAM(S): 25 TABLET ORAL at 05:30

## 2018-09-20 RX ADMIN — Medication 10 MILLIGRAM(S): at 21:43

## 2018-09-20 RX ADMIN — VALACYCLOVIR 1000 MILLIGRAM(S): 500 TABLET, FILM COATED ORAL at 13:52

## 2018-09-20 RX ADMIN — Medication 10 MILLIGRAM(S): at 05:30

## 2018-09-20 RX ADMIN — PANTOPRAZOLE SODIUM 40 MILLIGRAM(S): 20 TABLET, DELAYED RELEASE ORAL at 17:24

## 2018-09-20 RX ADMIN — Medication 100 MILLIGRAM(S): at 13:51

## 2018-09-20 RX ADMIN — Medication 10 MILLIGRAM(S): at 13:52

## 2018-09-20 RX ADMIN — SODIUM CHLORIDE 80 MILLILITER(S): 9 INJECTION INTRAMUSCULAR; INTRAVENOUS; SUBCUTANEOUS at 21:43

## 2018-09-20 RX ADMIN — TRAMADOL HYDROCHLORIDE 25 MILLIGRAM(S): 50 TABLET ORAL at 10:35

## 2018-09-20 RX ADMIN — SODIUM CHLORIDE 3 MILLILITER(S): 9 INJECTION INTRAMUSCULAR; INTRAVENOUS; SUBCUTANEOUS at 21:40

## 2018-09-20 RX ADMIN — PANTOPRAZOLE SODIUM 40 MILLIGRAM(S): 20 TABLET, DELAYED RELEASE ORAL at 05:29

## 2018-09-20 RX ADMIN — Medication 100 MILLIGRAM(S): at 21:42

## 2018-09-20 RX ADMIN — VALACYCLOVIR 1000 MILLIGRAM(S): 500 TABLET, FILM COATED ORAL at 05:29

## 2018-09-20 RX ADMIN — VALACYCLOVIR 1000 MILLIGRAM(S): 500 TABLET, FILM COATED ORAL at 21:43

## 2018-09-20 RX ADMIN — Medication 60 MILLIGRAM(S): at 05:29

## 2018-09-20 RX ADMIN — Medication 100 MILLIGRAM(S): at 05:41

## 2018-09-20 RX ADMIN — Medication 100 MILLIGRAM(S): at 05:30

## 2018-09-20 RX ADMIN — SODIUM CHLORIDE 80 MILLILITER(S): 9 INJECTION INTRAMUSCULAR; INTRAVENOUS; SUBCUTANEOUS at 13:14

## 2018-09-20 RX ADMIN — TRAMADOL HYDROCHLORIDE 25 MILLIGRAM(S): 50 TABLET ORAL at 12:13

## 2018-09-20 RX ADMIN — Medication 100 MILLIGRAM(S): at 21:43

## 2018-09-20 RX ADMIN — Medication 100 MILLIGRAM(S): at 13:52

## 2018-09-20 RX ADMIN — SODIUM CHLORIDE 3 MILLILITER(S): 9 INJECTION INTRAMUSCULAR; INTRAVENOUS; SUBCUTANEOUS at 13:50

## 2018-09-20 RX ADMIN — SODIUM CHLORIDE 3 MILLILITER(S): 9 INJECTION INTRAMUSCULAR; INTRAVENOUS; SUBCUTANEOUS at 06:24

## 2018-09-20 RX ADMIN — ATENOLOL 50 MILLIGRAM(S): 25 TABLET ORAL at 17:30

## 2018-09-20 RX ADMIN — Medication 10 DROP(S): at 12:14

## 2018-09-20 NOTE — DIETITIAN INITIAL EVALUATION ADULT. - OTHER INFO
58 yr old male admitted with excessive regurgitation, pain in the right ear, possible aspiration pneumonia, and leukocytosis. Pmhx of lap sleeve gastrecotomy (feb 2017), dm2, HTN, bladder cancer since 2003, and cholecystitis. Pt complains of nocturnal regurgitation through his nose. Pt reports it can take up to 1 hour to eat a meal and reports not lying immediately after eating. Pt reports consuming 2-3 small meals per day (high in protein and vegetables) and sometimes a snack. 2/2 to pt's gastric sleeve, discussed continuing adhernce of 2-3 high protein meals, 2 hours prior to lying down, and eating slowly. Bowel movements functioning properly (once or twice a day with occasional diarrhea, and notifies Dr if persistant). On admission, pt was NPO. Per GI consult, pt advanced to a clear liquid diet, kosher. Pt has had no wt change since last visit in April 2017 (224#). Skin is intact. Pt s/p esophogram and is pending EGD in am with dilation of sleeve if indicated. RD to follow up with results. 58 yr old male admitted with excessive regurgitation, pain in the right ear, possible aspiration pneumonia, and leukocytosis. Pmhx of lap sleeve gastrecotomy (feb 2017), dm2, HTN, bladder cancer since 2003, and cholecystitis. Pt complains of nocturnal regurgitation through his nose. Pt reports it can take up to 1 hour to eat a meal and reports not lying immediately after eating. Pt reports consuming 2-3 small meals per day (high in protein and vegetables) and sometimes a snack. 2/2 to pt's gastric sleeve, discussed continuing adhernce of 2-3 high protein meals, 2 hours prior to lying down, and eating slowly. Bowel movements functioning properly (once or twice a day with occasional diarrhea, and notifies Dr if persistant). On admission, pt was NPO. Per GI consult, pt advanced to a clear liquid diet, kosher. Pt has had no wt change since last visit in April 2017 (224#). Skin is intact. Pt s/p esophogram and is pending EGD in am with dilation of sleeve if indicated. RD to follow up for results.

## 2018-09-20 NOTE — PROGRESS NOTE ADULT - SUBJECTIVE AND OBJECTIVE BOX
PULMONARY/CRITICAL CARE      INTERVAL HPI/OVERNIGHT EVENTS:    58y MaleHPI:    Doing well. No fever. No sob. No abdominal pain. No vomiting. No cough. Ambulated.    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  WANTS TO BE POSITIONED ON SIDE WITH 2 TOWELS UNDER HEAD  Febrile Seizure: during childhood  Morbidly Obese  Bladder Cancer: since , tumor removed and now reportedly in remission  Diabetes Mellitus Type II  HTN (Hypertension)  S/P laparoscopic sleeve gastrectomy  S/P cystoscopy: - removal of bladder tumor   S/P Cystoscopy: every year;  History of Arthroscopic Knee Surgery: right        ICU Vital Signs Last 24 Hrs  T(C): 36.6 (20 Sep 2018 05:30), Max: 36.9 (19 Sep 2018 21:14)  T(F): 97.9 (20 Sep 2018 05:30), Max: 98.4 (19 Sep 2018 21:14)  HR: 60 (20 Sep 2018 05:30) (60 - 984)  BP: 179/81 (20 Sep 2018 05:30) (137/71 - 179/81)  BP(mean): --  ABP: --  ABP(mean): --  RR: 16 (20 Sep 2018 05:30) (16 - 18)  SpO2: 95% (20 Sep 2018 05:30) (95% - 97%)    Qtts:     I&O's Summary    19 Sep 2018 07:01  -  20 Sep 2018 07:00  --------------------------------------------------------  IN: 1410 mL / OUT: 0 mL / NET: 1410 mL            REVIEW OF SYSTEMS:    CONSTITUTIONAL: No fever, weight loss, or fatigue  EYES: No eye pain, visual disturbances, or discharge  ENMT:  No difficulty hearing, tinnitus, vertigo; No sinus or throat pain  NECK: No pain or stiffness  BREASTS: No pain, masses, or nipple discharge  RESPIRATORY: No cough, wheezing, chills or hemoptysis; No shortness of breath  CARDIOVASCULAR: No chest pain, palpitations, dizziness, or leg swelling  GASTROINTESTINAL: No abdominal or epigastric pain. No nausea, vomiting, or hematemesis; No diarrhea or constipation. No melena or hematochezia.  GENITOURINARY: No dysuria, frequency, hematuria, or incontinence  NEUROLOGICAL: No headaches, memory loss, loss of strength, numbness, or tremors  SKIN: No itching, burning, rashes, or lesions   LYMPH NODES: No enlarged glands  ENDOCRINE: No heat or cold intolerance; No hair loss  MUSCULOSKELETAL: No joint pain or swelling; No muscle, back, or extremity pain, no calf tenderness  PSYCHIATRIC: No depression, anxiety, mood swings, or difficulty sleeping  HEME/LYMPH: No easy bruising, or bleeding gums  ALLERGY AND IMMUNOLOGIC: No hives or eczema      PHYSICAL EXAM:    GENERAL: NAD, well-groomed, well-developed, NAD  HEAD:  Atraumatic, Normocephalic  EYES: EOMI, PERRLA, conjunctiva and sclera clear  ENMT: No tonsillar erythema, exudates, or enlargement; Moist mucous membranes, Good dentition, No lesions  NECK: Supple, No JVD, Normal thyroid  NERVOUS SYSTEM:  Alert & Oriented X3, Good concentration; Motor Strength 5/5 B/L upper and lower extremities  CHEST/LUNG: Clear to percussion bilaterally; No rales, rhonchi, wheezing, or rubs  HEART: Regular rate and rhythm; No murmurs, rubs, or gallops  ABDOMEN: Soft, Nontender, Nondistended; Bowel sounds present  EXTREMITIES:  2+ Peripheral Pulses, No clubbing, cyanosis, or edema  LYMPH: No lymphadenopathy noted  SKIN: No rashes or lesions        LABS:                        11.5   13.30 )-----------( 200      ( 20 Sep 2018 06:55 )             33.3     -    135  |  101  |  18  ----------------------------<  117<H>  3.5   |  31  |  0.93    Ca    8.5      19 Sep 2018 06:55    TPro  6.6  /  Alb  3.0<L>  /  TBili  1.1  /  DBili  x   /  AST  12  /  ALT  18  /  AlkPhos  64        Urinalysis Basic - ( 18 Sep 2018 14:29 )    Color: Yellow / Appearance: Clear / S.015 / pH: x  Gluc: x / Ketone: Trace  / Bili: Negative / Urobili: Negative mg/dL   < from: Xray Chest 2 Views PA/Lat (18 @ 10:12) >    EXAM:  XR CHEST PA LAT 2V                                  PROCEDURE DATE:  2018          INTERPRETATION:      INDICATION: Assess for pneumonia.    PA and lateral views of chest. Comparison is made to prior study of   2018.    FINDINGS:    There is elevation of the right hemidiaphragm. No acute consolidation.    There is no pleural effusion. There is cardiomegaly. There is thoracic   degenerative spondylosis .    IMPRESSION:  Clear lungs. Elevation of right hemidiaphragm, cardiomegaly.    < end of copied text >  Blood: x / Protein: 30 mg/dL / Nitrite: Negative   Leuk Esterase: Trace / RBC: 0-2 /HPF / WBC 0-2   Sq Epi: x / Non Sq Epi: Few / Bacteria: Few        vanco through     RADIOLOGY & ADDITIONAL STUDIES:< from: Xray Esophagram (18 @ 10:13) >    EXAM:  ESOPHAGUS                                  PROCEDURE DATE:  2018          INTERPRETATION:  History: status post gastric sleeve , vomiting and   reflux symptoms.     radiographs demonstrate nonspecific bowel gas pattern. Patient   ingested dilute barium with no difficulty. There is no evidence of reflux   or strictures.  Ingested contrast flowed unimpeded through the narrow gastric   pouch/gastric sleeve. There was no leak or extravasation. There was no   holdup of contrastat the surgical site.  Contrast extended through the   proximal bowel, which is grossly unremarkable.      Impression:   Status post sleeve gastrectomy. No evidence of obstruction or   extravasation. No gastroesophageal reflux was noted during this exam.    < end of copied text >        CRITICAL CARE TIME SPENT:

## 2018-09-20 NOTE — PROGRESS NOTE ADULT - ASSESSMENT
58 m a/w pneumonia, leukocytosis and SOB    Post sleeve gastrectomy w/ freq	uent nocturnal regurgitation  esophagram w/ reflux  will cont to follow, treat pneumonia per medicine  cannot eat for 2 hours before laying down to sleep  follow swallow eval-no OP dysphagia     Clear liquids, kosher.   NPO after MN  EGD in am, dilation of sleeve if indicated.   Use PPI daily

## 2018-09-20 NOTE — DISCHARGE NOTE ADULT - NS AS ACTIVITY OBS
Showering allowed/Sex allowed/Stairs allowed/Return to Work/School allowed/Walking-Outdoors allowed/Driving allowed/Walking-Indoors allowed/Bathing allowed

## 2018-09-20 NOTE — DISCHARGE NOTE ADULT - PATIENT PORTAL LINK FT
You can access the [facility] Patient Portal, offered by [facility], by registering with the following website: [email or web address]

## 2018-09-20 NOTE — PROGRESS NOTE ADULT - ASSESSMENT
59 yo male with hx of s/p lap sleeve gastrectomy with intra op EGD and hiatal hernia repair - 2/8/17- Dr. Garcia now admitted with aspiration pneumonia found on CT and elevated WBC. Appreciated speech and swallow eval. Consider upper EGD.

## 2018-09-20 NOTE — DISCHARGE NOTE ADULT - MEDICATION SUMMARY - MEDICATIONS TO TAKE
I will START or STAY ON the medications listed below when I get home from the hospital:    metroNIDAZOLE 500 mg oral tablet  -- 1 tab(s) by mouth every 8 hours   -- Indication: For Aspiration pneumonia of left lower lobe due to regurgitated food    Reglan 10 mg oral tablet  --  by mouth 3 times a day  -- Indication: For Nausea    losartan-hydrochlorothiazide 50mg-12.5mg oral tablet  -- 1 tab(s) by mouth once a day  -- Indication: For HTN (Hypertension)    valACYclovir 1 g oral tablet  -- 1 tab(s) by mouth every 8 hours  -- Indication: For Zoster    atenolol 100 mg oral tablet  -- 0.5 tab(s) by mouth 2 times a day  -- Indication: For HTN (Hypertension)    [employer] Glucose 4 g oral tablet, chewable  -- 1 tab(s) by mouth every 2 hours, As Needed for glucose <80.    -- Chew tablets before swallowing    -- Indication: For Hypoglycemia    magnesium citrate 1.745 g/30 mL oral liquid  -- 300 milliliter(s) by mouth once a day  -- Indication: For supplement    potassium chloride 20 mEq oral powder for reconstitution  -- 1 each by mouth once a day  -- Dilute this medication with liquid before administration.  It is very important that you take or use this exactly as directed.  Do not skip doses or discontinue unless directed by your doctor.  Medication should be taken with plenty of water.  Take with food or milk.    -- Indication: For supplement    omeprazole 20 mg oral delayed release capsule  -- 1 cap(s) by mouth once a day  -- It is very important that you take or use this exactly as directed.  Do not skip doses or discontinue unless directed by your doctor.  Obtain medical advice before taking any non-prescription drugs as some may affect the action of this medication.  Swallow whole.  Do not crush.    -- Indication: For Antacid    Levaquin 500 mg oral tablet  -- 1 tab(s) by mouth every 12 hours   -- Indication: For Aspiration pneumonia of left lower lobe due to regurgitated food    Vitamin D3 1000 intl units oral capsule  -- 1 cap(s) by mouth once a day  -- Indication: For supplement    Vitamin B12 1000 mcg oral tablet  -- 1 tab(s) by mouth once a day  -- Indication: For supplement

## 2018-09-20 NOTE — DISCHARGE NOTE ADULT - PLAN OF CARE
- Was seen on CT Scan, but not on chest x-ray  - Follow-up with your primary doctor for repeat CT Chest in 6 weeks to ensure resolution  - Finish 7 days of antibiotics - Valtrex (total 7 days), Prednisone (total 5 days)  - Schedule an appointment with Opthalmology Resolution of symptoms Resume regular bariatric diet as tolerated.  Continue PPI's.  f/u with Dr Garcia Bariatric Surgery,  F/u with Dr Castellon (GI).  Will need repeat EGD as an outpt. - Valtrex (total 7 days), Prednisone (total 5 days)  - Schedule an appointment with ENT

## 2018-09-20 NOTE — PROGRESS NOTE ADULT - SUBJECTIVE AND OBJECTIVE BOX
ID progress note     Name: DEWAYNE NARAYANAN  Age: 58y  Gender: Male  MRN: [id number]    Interval History-- Events noted, doing well , remains on clear liquid diet only . No nausea or vomiting . Afebrile . seen by GI . VES was negative . Anxious to go home . Improved pain right ear . he has the rash for few days   Notes reviewed    Past Medical History--  Allergy  Nevus comedonicus of face  Cholecystitis  Obstructive Sleep Apnea  Herniated Disc  Febrile Seizure  Morbidly Obese  Bladder Cancer  Diabetes Mellitus Type II  Hyperlipidemia  HTN (Hypertension)  S/P laparoscopic sleeve gastrectomy  S/P cystoscopy  S/P Cystoscopy  History of Arthroscopic Knee Surgery      For details regarding the patient's social history, family history, and other miscellaneous elements, please refer the initial infectious diseases consultation and/or the admitting history and physical examination for this admission.    Allergies--  Allergies    penicillins (Rash)  STARCH used in bedding (Hives; Rash)    Intolerances        Medications--  Antibiotics:  levoFLOXacin IVPB      levoFLOXacin IVPB 750 milliGRAM(s) IV Intermittent every 24 hours  metroNIDAZOLE  IVPB 500 milliGRAM(s) IV Intermittent every 8 hours  metroNIDAZOLE  IVPB      valACYclovir 1000 milliGRAM(s) Oral every 8 hours    Immunologic:    Other:  acetaminophen   Tablet .. PRN  ATENolol  Tablet  docusate sodium  insulin lispro (HumaLOG) corrective regimen sliding scale  metoclopramide  ofloxacin 0.3% Solution  ondansetron Injectable PRN  pantoprazole  Injectable  predniSONE   Tablet  senna PRN  sodium chloride 0.9% lock flush  sodium chloride 0.9%.  traMADol PRN  traMADol PRN      Review of Systems--  A 10-point review of systems was obtained.     Pertinent positives and negatives--  Constitutional: No fevers. No Chills. No Rigors.   Cardiovascular: No chest pain. No palpitations.  Respiratory: No shortness of breath. No cough.  Gastrointestinal: No nausea or vomiting. No diarrhea or constipation.   Psychiatric: Pleasant. Appropriate affect.    Review of systems otherwise negative except as previously noted.    Physical Examination--  Vital Signs: T(F): 97.9 (09-20-18 @ 05:30), Max: 98.4 (09-19-18 @ 21:14)  HR: 60 (09-20-18 @ 05:30)  BP: 179/81 (09-20-18 @ 05:30)  RR: 16 (09-20-18 @ 05:30)  SpO2: 95% (09-20-18 @ 05:30)  Wt(kg): --  General: Nontoxic-appearing Male in no acute distress.  HEENT: AT/NC. PERRL. EOMI. Anicteric. Conjunctiva pink and moist. Oropharynx clear. Dentition fair.  Neck: Not rigid. No sense of mass.  Nodes: None palpable.  Lungs: Clear bilaterally without rales, wheezing or rhonchi  Heart: Regular rate and rhythm. No Murmur. No rub. No gallop. No palpable thrill.  Abdomen: Bowel sounds present and normoactive. Soft. Nondistended. Nontender. No sense of mass. No organomegaly.  Back: No spinal tenderness. No costovertebral angle tenderness.   Extremities: No cyanosis or clubbing. No edema.   Skin: Warm. Dry. Good turgor. No vasculitic stigmata. No new vesicles   Psychiatric: Appropriate affect and mood for situation.         Laboratory Studies--  CBC                        11.5   13.30 )-----------( 200      ( 20 Sep 2018 06:55 )             33.3       Chemistries  09-20    136  |  102  |  15  ----------------------------<  143<H>  3.7   |  28  |  0.89    Ca    9.0      20 Sep 2018 06:55    TPro  6.6  /  Alb  3.0<L>  /  TBili  1.1  /  DBili  x   /  AST  12  /  ALT  18  /  AlkPhos  64  09-19      Culture Data    Culture - Urine (collected 18 Sep 2018 21:03)  Source: .Urine Clean Catch (Midstream)  Final Report (19 Sep 2018 23:21):    No growth    RADIOLOGY:  Xray Esophagram (09.19.18 @ 10:13) >     radiographs demonstrate nonspecific bowel gas pattern. Patient   ingested dilute barium with no difficulty. There is no evidence of reflux   or strictures.  Ingested contrast flowed unimpeded through the narrow gastric   pouch/gastric sleeve. There was no leak or extravasation. There was no   holdup of contrastat the surgical site.  Contrast extended through the   proximal bowel, which is grossly unremarkable.      Impression:   Status post sleeve gastrectomy. No evidence of obstruction or   extravasation. No gastroesophageal reflux was noted during this exam.    CT Chest No Cont (09.18.18 @ 19:40) >  Extensive airspace infiltrates in the left upper and lower lobe   consistent with pneumonia. Please image to resolution.  No pleural or pericardial effusion.  Central airways patent. No mediastinal adenopathy. Lack of IV contrast   limits evaluation of the may. Nonaneurysmal thoracic aorta.  Normal heart size.  No acute abnormality upper abdomen. Status post gastric surgery. Right   renal cyst.  Advanced degenerative spondylosis dorsal spine    Impression:    Left upper and left lower lobe pneumonia. Please image to resolution.     Xray Chest 1 View AP/PA (09.18.18 @ 16:11) >  INTERPRETATION:  AP semierect chest on September 18, 2018 at 3:59 PM.   Patient has vomiting and concern is aspiration. COMPARISON: None   available.    The heart is magnified by technique.    The lung fields and pleural surfaces are unremarkable.     IMPRESSION: No infiltrate.      Assessment :   59 yo male with hx of s/p lap sleeve gastrectomy with intra op EGD and hiatal hernia repair - 2/8/17- Dr. Garcia  presenting with N/V, and reflex symptoms that started this morning.  Pt reports with worsening nocturnal reflux symptoms  and regurgitation of food-  > 3  months, He likely has recurrent aspiration pneumonia . He has hx of severe GERD , last EGD was in April 2018     he has pain and vesicles on right side of face likely has Zoster with post herpetic neuralgia     He has hx of bladder cancer so he he has recurrent hematuria he should have urology follow up       Plan :   - will continue with  Levaquin 750 mg daily with Flagyl 500 mg q 8 , change to po once on regular diet , needs total 7 days   - will DC Contact precautions for H zoster as only single dermatome involved   -  complete 7 dyas of valtrex. may benefit from use of Neurontin  - GI evaluation noted , patient may need EGD  - trend CBC  - aspiration precautions     Discussed case with Dr. Mert Arita     Continue with present regime .  Appropriate use of antibiotics and adverse effects reviewed.    I have discussed the above plan of care with patient in detail. he  expressed understanding of the treatment plan . Risks, benefits and alternatives discussed in detail. I have asked if He has  any questions or concerns and appropriately addressed them to the best of my ability .      > 35 minutes spent in direct patient care reviewing  the notes, lab data/ imaging , discussion with multidisciplinary team. All questions were addressed and answered to the best of my capacity .    I will be away from 9/20/18 pm thru 9/27/18 am  , Dr. West is covering me.      Thank you for allowing me to participate in the care of your patient .        Tim Dill MD  [phone]

## 2018-09-20 NOTE — PROGRESS NOTE ADULT - SUBJECTIVE AND OBJECTIVE BOX
Chief Complaint:        INTERVAL HPI/OVERNIGHT EVENTS:  no significant events overnight reported         MEDICATIONS  (STANDING):  ATENolol  Tablet 50 milliGRAM(s) Oral two times a day  docusate sodium 100 milliGRAM(s) Oral three times a day  insulin lispro (HumaLOG) corrective regimen sliding scale   SubCutaneous every 6 hours  levoFLOXacin IVPB      levoFLOXacin IVPB 750 milliGRAM(s) IV Intermittent every 24 hours  metoclopramide 10 milliGRAM(s) Oral three times a day  metroNIDAZOLE  IVPB 500 milliGRAM(s) IV Intermittent every 8 hours  metroNIDAZOLE  IVPB      ofloxacin 0.3% Solution 10 Drop(s) Right Ear daily  pantoprazole  Injectable 40 milliGRAM(s) IV Push two times a day  predniSONE   Tablet 60 milliGRAM(s) Oral daily  sodium chloride 0.9% lock flush 3 milliLiter(s) IV Push every 8 hours  sodium chloride 0.9%. 1000 milliLiter(s) (80 mL/Hr) IV Continuous <Continuous>  valACYclovir 1000 milliGRAM(s) Oral every 8 hours    MEDICATIONS  (PRN):  acetaminophen   Tablet .. 650 milliGRAM(s) Oral every 6 hours PRN Mild Pain (1 - 3)  ondansetron Injectable 4 milliGRAM(s) IV Push every 6 hours PRN Nausea  senna 2 Tablet(s) Oral at bedtime PRN Constipation  traMADol 25 milliGRAM(s) Oral every 6 hours PRN Moderate Pain (4 - 6)  traMADol 50 milliGRAM(s) Oral every 6 hours PRN Severe Pain (7 - 10)            Vital Signs Last 24 Hrs  T(C): 37.1 (20 Sep 2018 13:50), Max: 37.1 (20 Sep 2018 13:50)  T(F): 98.8 (20 Sep 2018 13:50), Max: 98.8 (20 Sep 2018 13:50)  HR: 72 (20 Sep 2018 13:50) (60 - 984)  BP: 184/87 (20 Sep 2018 13:50) (137/71 - 184/87)  BP(mean): --  RR: 17 (20 Sep 2018 13:50) (16 - 18)  SpO2: 97% (20 Sep 2018 13:50) (95% - 97%)    Physical exam:  abd soft, obese. non tender  cv s1s2  chest air entry          LABS:                        11.5   13.30 )-----------( 200      ( 20 Sep 2018 06:55 )             33.3     09-20    136  |  102  |  15  ----------------------------<  143<H>  3.7   |  28  |  0.89    Ca    9.0      20 Sep 2018 06:55    TPro  6.6  /  Alb  3.0<L>  /  TBili  1.1  /  DBili  x   /  AST  12  /  ALT  18  /  AlkPhos  64  09-19          RADIOLOGY & ADDITIONAL TESTS:

## 2018-09-20 NOTE — DISCHARGE NOTE ADULT - REASON FOR ADMISSION
Patient is a 58y old  Male who presents with a chief complaint of possible aspiration pneumonia (20 Sep 2018 07:58)

## 2018-09-20 NOTE — PROGRESS NOTE ADULT - ASSESSMENT
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 V1 involvement, no changes in vision, discussed with Optho Dr. Nelson over phone on 9/19, continue current tx regimen, no role for prophylactic eye drops, f/u with Optho upon discharge    JOSÉ  - Non-compliant.  Pulmonary f/u appreciated    DM2  - Resolved after weight loss s/p gastric sleeve  - sliding scale being used (on prednisone)  - monitor FS today, if acceptable, DC sliding scale     Left Sided CAP ? Aspiration  - Antibiotics - Levaquin and Flagyl  - Swallow eval noted - recommended dysphagia 3 soft solids with thin liquids  - Admits to cough and chills recently  - Repeat imaging in 6 weeks to ensure resolution    HTN.  - Continue with home medications with hold parameters, and Monitor BP  - elevated this morning, no HA/dizziness/CP    Remote History of Bladder Cancer (around age 39)  - No hx smoking  - Was thought be be related to his Diet Coke intake (was drinking up to 1 liter a day)  - No gross hematuria  - No microscopic hematuria on UA  - Routine F/U with PCP Patient is 59 yo male with 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 V1 involvement, no changes in vision, discussed with Optho Dr. Nelson over phone on 9/19, continue current tx regimen, no role for prophylactic eye drops, f/u with Optho upon discharge    JOSÉ  - Non-compliant.  Pulmonary f/u appreciated    DM2  - Resolved after weight loss s/p gastric sleeve  - sliding scale being used (on prednisone)  - monitor FS today, if acceptable, DC sliding scale     Left Sided CAP ? Aspiration  - Antibiotics - Levaquin and Flagyl  - Swallow eval noted - recommended dysphagia 3 soft solids with thin liquids  - Admits to cough and chills recently  - Repeat imaging in 6 weeks to ensure resolution    HTN.  - Continue with home medications with hold parameters, and Monitor BP  - elevated this morning, no HA/dizziness/CP    Remote History of Bladder Cancer (around age 39)  - No hx smoking  - Was thought be be related to his Diet Coke intake (was drinking up to 1 liter a day)  - No gross hematuria  - No microscopic hematuria on UA  - Routine F/U with PCP Patient is 59 yo male with 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 V1 involvement, no changes in vision, discussed with Optho Dr. Nelson over phone on 9/19, continue current tx regimen, no role for prophylactic eye drops, f/u with Optho upon discharge    JOSÉ  - Non-compliant.  Pulmonary f/u appreciated    DM2  - Resolved after weight loss s/p gastric sleeve  - sliding scale being used (on prednisone)  - monitor FS today, if acceptable, DC sliding scale     Left Sided CAP ? Aspiration  - Antibiotics - Levaquin and Flagyl  - Swallow eval noted - recommended dysphagia 3 soft solids with thin liquids  - Admits to intermittent dry cough over last few days  - Leukocytosis improving  - Repeat imaging in 6 weeks to ensure resolution    HTN.  - Continue with home medications with hold parameters, and Monitor BP  - elevated this morning, no HA/dizziness/CP    Remote History of Bladder Cancer (around age 39)  - No hx smoking  - Was thought be be related to his Diet Coke intake (was drinking up to 1 liter a day)  - No gross hematuria  - No microscopic hematuria on UA  - Routine F/U with PCP

## 2018-09-20 NOTE — PROGRESS NOTE ADULT - SUBJECTIVE AND OBJECTIVE BOX
Patient is 57 yo male with hx of s/p lap sleeve gastrectomy with intra op EGD and hiatal hernia repair - 2/8/17- Dr. Garcia now admitted with aspiration pneumonia found on CT and elevated WBC. He continues to get IV antibiotics.  Patient is feeling better today with improvement in fatigue and ear pain. He is concurrently being treated for Herpes zoster involving the right ear.  He is tolerating clear liquid diet and denies nausea or vomiting.  Had speech and swallow evaluation    VITALS:  Vital Signs Last 24 Hrs  T(C): 37 (20 Sep 2018 10:00), Max: 37 (20 Sep 2018 10:00)  T(F): 98.6 (20 Sep 2018 10:00), Max: 98.6 (20 Sep 2018 10:00)  HR: 72 (20 Sep 2018 10:00) (60 - 984)  BP: 171/88 (20 Sep 2018 10:00) (137/71 - 179/81)  BP(mean): --  RR: 17 (20 Sep 2018 10:00) (16 - 18)  SpO2: 96% (20 Sep 2018 10:00) (95% - 97%)        09-19-18 @ 07:01  -  09-20-18 @ 07:00  --------------------------------------------------------  IN: 1410 mL / OUT: 0 mL / NET: 1410 mL    LABS:                          11.5   13.30 )-----------( 200      ( 20 Sep 2018 06:55 )             33.3     09-20    136  |  102  |  15  ----------------------------<  143<H>  3.7   |  28  |  0.89    Ca    9.0      20 Sep 2018 06:55    TPro  6.6  /  Alb  3.0<L>  /  TBili  1.1  /  DBili  x   /  AST  12  /  ALT  18  /  AlkPhos  64  09-19      CAPILLARY BLOOD GLUCOSE      POCT Blood Glucose.: 148 mg/dL (20 Sep 2018 12:14)  POCT Blood Glucose.: 135 mg/dL (20 Sep 2018 05:36)  POCT Blood Glucose.: 159 mg/dL (19 Sep 2018 23:45)  POCT Blood Glucose.: 142 mg/dL (19 Sep 2018 20:37)  POCT Blood Glucose.: 126 mg/dL (19 Sep 2018 16:37)                Physical Exam:  General: Alert & Oriented x 3.  NAD, resting comfortably in bed.    Abdominal: Soft - Non tender - No swelling noted.   Extremities: No swelling/ edema / erythema noted bilateral upper / lower extremities.            Assessment:  57 yo male with hx of s/p lap sleeve gastrectomy with intra op EGD and hiatal hernia repair - 2/8/17- Dr. Garcia  presented to [facility] -N/V, chest pain and reflex symptoms that yesterday morning.  Pt is hemodynamically stable. Decrease in WBC this am to 16. Afebrile. History of nocturnal  reflux symptoms - and regurgitation of food-  > 3  months.     Plan:  Seen by pulmonary/ hospitalist/ infectious disease.    Video Esophagram to be performed this am with lose dose barium as recommended by radiologist.   Continue IV hydration / IV antibiotic regimen.   GI consult - consider upper EGD   Will consider Swallow Eval - Speech as per pulmonary.   Cont  OOB / ambulation on floor / incentive spirometry. 59 yo male with hx of s/p lap sleeve gastrectomy with intra op EGD and hiatal hernia repair - 2/8/17- Dr. Garcia now admitted with aspiration pneumonia found on CT and elevated WBC. He continues to get IV antibiotics.  Patient is feeling better today with improvement in fatigue and ear pain. He is concurrently being treated for Herpes zoster involving the right ear.  He is tolerating clear liquid diet and denies nausea or vomiting. VE performed yesterday was unremarkable and showed no evidence of reflux. Had speech and swallow evaluation.     VITALS:  Vital Signs Last 24 Hrs  T(C): 37 (20 Sep 2018 10:00), Max: 37 (20 Sep 2018 10:00)  T(F): 98.6 (20 Sep 2018 10:00), Max: 98.6 (20 Sep 2018 10:00)  HR: 72 (20 Sep 2018 10:00) (60 - 984)  BP: 171/88 (20 Sep 2018 10:00) (137/71 - 179/81)  BP(mean): --  RR: 17 (20 Sep 2018 10:00) (16 - 18)  SpO2: 96% (20 Sep 2018 10:00) (95% - 97%)        09-19-18 @ 07:01  -  09-20-18 @ 07:00  --------------------------------------------------------  IN: 1410 mL / OUT: 0 mL / NET: 1410 mL    LABS:                          11.5   13.30 )-----------( 200      ( 20 Sep 2018 06:55 )             33.3     09-20    136  |  102  |  15  ----------------------------<  143<H>  3.7   |  28  |  0.89    Ca    9.0      20 Sep 2018 06:55    TPro  6.6  /  Alb  3.0<L>  /  TBili  1.1  /  DBili  x   /  AST  12  /  ALT  18  /  AlkPhos  64  09-19      CAPILLARY BLOOD GLUCOSE      POCT Blood Glucose.: 148 mg/dL (20 Sep 2018 12:14)  POCT Blood Glucose.: 135 mg/dL (20 Sep 2018 05:36)  POCT Blood Glucose.: 159 mg/dL (19 Sep 2018 23:45)  POCT Blood Glucose.: 142 mg/dL (19 Sep 2018 20:37)  POCT Blood Glucose.: 126 mg/dL (19 Sep 2018 16:37)        Physical Exam:  General: Alert & Oriented x 3.  NAD, resting comfortably in bed.    Abdominal: Soft - Non tender - No swelling noted.   Extremities: No swelling/ edema / erythema noted bilateral upper / lower extremities.

## 2018-09-20 NOTE — DISCHARGE NOTE ADULT - CARE PLAN
Principal Discharge DX:	Vomiting in adult  Secondary Diagnosis:	Aspiration pneumonia of left lower lobe due to regurgitated food  Assessment and plan of treatment:	- Was seen on CT Scan, but not on chest x-ray  - Follow-up with your primary doctor for repeat CT Chest in 6 weeks to ensure resolution  - Finish 7 days of antibiotics  Secondary Diagnosis:	Herpes zoster with complication  Assessment and plan of treatment:	- Valtrex (total 7 days), Prednisone (total 5 days)  - Schedule an appointment with Opthalmology Principal Discharge DX:	Vomiting in adult  Goal:	Resolution of symptoms  Assessment and plan of treatment:	Resume regular bariatric diet as tolerated.  Continue PPI's.  f/u with Dr Garcia Bariatric Surgery,  F/u with Dr Castellon (GI).  Will need repeat EGD as an outpt.  Secondary Diagnosis:	Aspiration pneumonia of left lower lobe due to regurgitated food  Assessment and plan of treatment:	- Was seen on CT Scan, but not on chest x-ray  - Follow-up with your primary doctor for repeat CT Chest in 6 weeks to ensure resolution  - Finish 7 days of antibiotics  Secondary Diagnosis:	Herpes zoster with complication  Assessment and plan of treatment:	- Valtrex (total 7 days), Prednisone (total 5 days)  - Schedule an appointment with ENT

## 2018-09-20 NOTE — DISCHARGE NOTE ADULT - HOSPITAL COURSE
Admitted with nausea and vomiting.  Found to have aspiration pneumonia to Left lung with Active [place] to the Right trigeminal distribution.  Esophagram showed no anomaly  Received  Valtrex, Steroids and antibiotics.  Symptoms improved.  Underwent EGD and found to have mild gastriitis with subjective narrowing in midbody of sleeve.  This was dilated without difficulty.  Symptoms improved and pt stable for discharge to home.

## 2018-09-20 NOTE — DISCHARGE NOTE ADULT - CARE PROVIDERS DIRECT ADDRESSES
[email or web address]

## 2018-09-20 NOTE — DISCHARGE NOTE ADULT - CARE PROVIDER_API CALL
Angelo Garcia), Surgery  [address] [place] [address]  Phone: [phone]  Fax: [phone]    Casey Castellon), Gastroenterology  [address]  [place] [address]  Phone: [phone]  Fax: [phone]    Valentine Hammonds), Internal Medicine; Nephrology  [address]  [address]  [place] [address]  Phone: [phone]  Fax: [phone]

## 2018-09-20 NOTE — PROGRESS NOTE ADULT - SUBJECTIVE AND OBJECTIVE BOX
INTERVAL HPI/OVERNIGHT EVENTS:   Patient seen and examined.  Tolerated water this morning.  No changes in vision.  Ear pain with significant improvement.  No fevers, sweats, dizziness, HA,   cp, palpitations, sob,  diarrhea, abd pain, dysuria, focal weakness, or calf pain.      REVIEW OF SYSTEMS:  See HPI,  all others negative    PHYSICAL EXAM:  Vital Signs Last 24 Hrs  T(C): 36.6 (20 Sep 2018 05:30), Max: 36.9 (19 Sep 2018 21:14)  T(F): 97.9 (20 Sep 2018 05:30), Max: 98.4 (19 Sep 2018 21:14)  HR: 60 (20 Sep 2018 05:30) (60 - 984)  BP: 179/81 (20 Sep 2018 05:30) (137/71 - 179/81)  BP(mean): --  RR: 16 (20 Sep 2018 05:30) (16 - 18)  SpO2: 95% (20 Sep 2018 05:30) (95% - 97%)    GENERAL: NAD, well-groomed, well-developed, awake, alert, oriented x 3, fluent and coherent speech  EYES: EOMI, PERRLA, conjunctiva and sclera clear  ENMT: No tonsillar erythema, exudates, or enlargement; Moist mucous membranes, Good dentition, No lesions             Right pinnna - decreased erythema and tenderness  NECK: Supple, No JVD, No Cervical LAD, No thyromegaly, No thyroid nodules felt  NERVOUS SYSTEM:  Good concentration; Moving all 4 extremities; No gross sensory deficits, No facial droop/paralysis  CHEST WALL: No masses  CHEST/LUNG: Clear to auscultation bilaterally; No rales, rhonchi, wheezing, or rubs  HEART: Regular rate and rhythm; No murmurs, rubs, or gallops  ABDOMEN: Soft, Nontender, obese, Bowel sounds present, No palpable masses or organomegaly, No bruits  EXTREMITIES:  2+ Peripheral Pulses, No clubbing, cyanosis, trace b/l LE  LYMPH: No lymphadenopathy   SKIN: scattered papules starting in front of right ear, along upper cheek, no lesions on forehead or lower face    LABS:                                   11.5   13.30 )-----------( 200      ( 20 Sep 2018 06:55 )             33.3     09-20    136  |  102  |  15  ----------------------------<  143<H>  3.7   |  28  |  0.89    Ca    9.0      20 Sep 2018 06:55    TPro  6.6  /  Alb  3.0<L>  /  TBili  1.1  /  DBili  x   /  AST  12  /  ALT  18  /  AlkPhos  64  09-19 INTERVAL HPI/OVERNIGHT EVENTS:   Patient seen and examined.  Tolerated water this morning.  No changes in vision.  Ear pain with significant improvement.  Minimal dry cough.  No fevers, sweats, dizziness, HA,   cp, palpitations, sob,  diarrhea, abd pain, dysuria, focal weakness, or calf pain.      REVIEW OF SYSTEMS:  See HPI,  all others negative    PHYSICAL EXAM:  Vital Signs Last 24 Hrs  T(C): 36.6 (20 Sep 2018 05:30), Max: 36.9 (19 Sep 2018 21:14)  T(F): 97.9 (20 Sep 2018 05:30), Max: 98.4 (19 Sep 2018 21:14)  HR: 60 (20 Sep 2018 05:30) (60 - 984)  BP: 179/81 (20 Sep 2018 05:30) (137/71 - 179/81)  BP(mean): --  RR: 16 (20 Sep 2018 05:30) (16 - 18)  SpO2: 95% (20 Sep 2018 05:30) (95% - 97%)    GENERAL: NAD, well-groomed, well-developed, awake, alert, oriented x 3, fluent and coherent speech  EYES: EOMI, PERRLA, conjunctiva and sclera clear  ENMT: No tonsillar erythema, exudates, or enlargement; Moist mucous membranes, Good dentition, No lesions             Right pinnna - decreased erythema and tenderness  NECK: Supple, No JVD, No Cervical LAD, No thyromegaly, No thyroid nodules felt  NERVOUS SYSTEM:  Good concentration; Moving all 4 extremities; No gross sensory deficits, No facial droop/paralysis  CHEST WALL: No masses  CHEST/LUNG: Clear to auscultation bilaterally; No rales, rhonchi, wheezing, or rubs  HEART: Regular rate and rhythm; No murmurs, rubs, or gallops  ABDOMEN: Soft, Nontender, obese, Bowel sounds present, No palpable masses or organomegaly, No bruits  EXTREMITIES:  2+ Peripheral Pulses, No clubbing, cyanosis, trace b/l LE  LYMPH: No lymphadenopathy   SKIN: scattered papules starting in front of right ear, along upper cheek, no lesions on forehead or lower face    LABS:                                   11.5   13.30 )-----------( 200      ( 20 Sep 2018 06:55 )             33.3     09-20    136  |  102  |  15  ----------------------------<  143<H>  3.7   |  28  |  0.89    Ca    9.0      20 Sep 2018 06:55    TPro  6.6  /  Alb  3.0<L>  /  TBili  1.1  /  DBili  x   /  AST  12  /  ALT  18  /  AlkPhos  64  09-19

## 2018-09-21 ENCOUNTER — RESULT REVIEW (OUTPATIENT)
Age: 58
End: 2018-09-21

## 2018-09-21 VITALS
RESPIRATION RATE: 17 BRPM | SYSTOLIC BLOOD PRESSURE: 181 MMHG | HEART RATE: 67 BPM | OXYGEN SATURATION: 100 % | DIASTOLIC BLOOD PRESSURE: 88 MMHG

## 2018-09-21 LAB
ANION GAP SERPL CALC-SCNC: 5 MMOL/L — SIGNIFICANT CHANGE UP (ref 5–17)
BASOPHILS # BLD AUTO: 0.03 K/UL — SIGNIFICANT CHANGE UP (ref 0–0.2)
BASOPHILS NFR BLD AUTO: 0.3 % — SIGNIFICANT CHANGE UP (ref 0–2)
BUN SERPL-MCNC: 15 MG/DL — SIGNIFICANT CHANGE UP (ref 7–23)
CALCIUM SERPL-MCNC: 9 MG/DL — SIGNIFICANT CHANGE UP (ref 8.4–10.5)
CHLORIDE SERPL-SCNC: 104 MMOL/L — SIGNIFICANT CHANGE UP (ref 96–108)
CO2 SERPL-SCNC: 29 MMOL/L — SIGNIFICANT CHANGE UP (ref 22–31)
CREAT SERPL-MCNC: 1 MG/DL — SIGNIFICANT CHANGE UP (ref 0.5–1.3)
EOSINOPHIL # BLD AUTO: 0.15 K/UL — SIGNIFICANT CHANGE UP (ref 0–0.5)
EOSINOPHIL NFR BLD AUTO: 1.3 % — SIGNIFICANT CHANGE UP (ref 0–6)
GLUCOSE BLDC GLUCOMTR-MCNC: 130 MG/DL — HIGH (ref 70–99)
GLUCOSE BLDC GLUCOMTR-MCNC: 154 MG/DL — HIGH (ref 70–99)
GLUCOSE BLDC GLUCOMTR-MCNC: 156 MG/DL — HIGH (ref 70–99)
GLUCOSE BLDC GLUCOMTR-MCNC: 97 MG/DL — SIGNIFICANT CHANGE UP (ref 70–99)
GLUCOSE SERPL-MCNC: 114 MG/DL — HIGH (ref 70–99)
HCT VFR BLD CALC: 33 % — LOW (ref 39–50)
HGB BLD-MCNC: 11.5 G/DL — LOW (ref 13–17)
IMM GRANULOCYTES NFR BLD AUTO: 0.4 % — SIGNIFICANT CHANGE UP (ref 0–1.5)
LYMPHOCYTES # BLD AUTO: 1.49 K/UL — SIGNIFICANT CHANGE UP (ref 1–3.3)
LYMPHOCYTES # BLD AUTO: 12.8 % — LOW (ref 13–44)
MCHC RBC-ENTMCNC: 29.8 PG — SIGNIFICANT CHANGE UP (ref 27–34)
MCHC RBC-ENTMCNC: 34.8 GM/DL — SIGNIFICANT CHANGE UP (ref 32–36)
MCV RBC AUTO: 85.5 FL — SIGNIFICANT CHANGE UP (ref 80–100)
MONOCYTES # BLD AUTO: 1.05 K/UL — HIGH (ref 0–0.9)
MONOCYTES NFR BLD AUTO: 9 % — SIGNIFICANT CHANGE UP (ref 2–14)
NEUTROPHILS # BLD AUTO: 8.85 K/UL — HIGH (ref 1.8–7.4)
NEUTROPHILS NFR BLD AUTO: 76.2 % — SIGNIFICANT CHANGE UP (ref 43–77)
NRBC # BLD: 0 /100 WBCS — SIGNIFICANT CHANGE UP (ref 0–0)
PLATELET # BLD AUTO: 211 K/UL — SIGNIFICANT CHANGE UP (ref 150–400)
POTASSIUM SERPL-MCNC: 3.2 MMOL/L — LOW (ref 3.5–5.3)
POTASSIUM SERPL-SCNC: 3.2 MMOL/L — LOW (ref 3.5–5.3)
RBC # BLD: 3.86 M/UL — LOW (ref 4.2–5.8)
RBC # FLD: 13.1 % — SIGNIFICANT CHANGE UP (ref 10.3–14.5)
SODIUM SERPL-SCNC: 138 MMOL/L — SIGNIFICANT CHANGE UP (ref 135–145)
WBC # BLD: 11.62 K/UL — HIGH (ref 3.8–10.5)
WBC # FLD AUTO: 11.62 K/UL — HIGH (ref 3.8–10.5)

## 2018-09-21 PROCEDURE — 88312 SPECIAL STAINS GROUP 1: CPT | Mod: 26

## 2018-09-21 PROCEDURE — 88305 TISSUE EXAM BY PATHOLOGIST: CPT | Mod: 26

## 2018-09-21 PROCEDURE — 12345: CPT | Mod: NC

## 2018-09-21 PROCEDURE — 71045 X-RAY EXAM CHEST 1 VIEW: CPT | Mod: 26

## 2018-09-21 PROCEDURE — 99233 SBSQ HOSP IP/OBS HIGH 50: CPT

## 2018-09-21 RX ORDER — POTASSIUM CHLORIDE 20 MEQ
10 PACKET (EA) ORAL
Qty: 0 | Refills: 0 | Status: COMPLETED | OUTPATIENT
Start: 2018-09-21 | End: 2018-09-21

## 2018-09-21 RX ORDER — CIPROFLOXACIN LACTATE 400MG/40ML
1 VIAL (ML) INTRAVENOUS
Qty: 10 | Refills: 0 | OUTPATIENT
Start: 2018-09-21 | End: 2018-09-25

## 2018-09-21 RX ORDER — LOSARTAN/HYDROCHLOROTHIAZIDE 100MG-25MG
1 TABLET ORAL
Qty: 0 | Refills: 0 | COMMUNITY

## 2018-09-21 RX ORDER — LOSARTAN POTASSIUM 100 MG/1
50 TABLET, FILM COATED ORAL DAILY
Qty: 0 | Refills: 0 | Status: DISCONTINUED | OUTPATIENT
Start: 2018-09-21 | End: 2018-09-21

## 2018-09-21 RX ORDER — VALACYCLOVIR 500 MG/1
1 TABLET, FILM COATED ORAL
Qty: 9 | Refills: 0 | OUTPATIENT
Start: 2018-09-21 | End: 2018-09-23

## 2018-09-21 RX ORDER — METOCLOPRAMIDE HCL 10 MG
0 TABLET ORAL
Qty: 0 | Refills: 0 | COMMUNITY

## 2018-09-21 RX ORDER — SODIUM CHLORIDE 9 MG/ML
1000 INJECTION, SOLUTION INTRAVENOUS
Qty: 0 | Refills: 0 | Status: DISCONTINUED | OUTPATIENT
Start: 2018-09-21 | End: 2018-09-21

## 2018-09-21 RX ORDER — ATENOLOL 25 MG/1
0.5 TABLET ORAL
Qty: 0 | Refills: 0 | COMMUNITY

## 2018-09-21 RX ORDER — PREGABALIN 225 MG/1
1 CAPSULE ORAL
Qty: 0 | Refills: 0 | COMMUNITY

## 2018-09-21 RX ORDER — METRONIDAZOLE 500 MG
1 TABLET ORAL
Qty: 15 | Refills: 0 | OUTPATIENT
Start: 2018-09-21 | End: 2018-09-25

## 2018-09-21 RX ORDER — CHOLECALCIFEROL (VITAMIN D3) 125 MCG
1 CAPSULE ORAL
Qty: 0 | Refills: 0 | COMMUNITY

## 2018-09-21 RX ORDER — AMLODIPINE BESYLATE 2.5 MG/1
5 TABLET ORAL ONCE
Qty: 0 | Refills: 0 | Status: COMPLETED | OUTPATIENT
Start: 2018-09-21 | End: 2018-09-21

## 2018-09-21 RX ADMIN — VALACYCLOVIR 1000 MILLIGRAM(S): 500 TABLET, FILM COATED ORAL at 16:22

## 2018-09-21 RX ADMIN — Medication 10 MILLIGRAM(S): at 05:42

## 2018-09-21 RX ADMIN — LOSARTAN POTASSIUM 50 MILLIGRAM(S): 100 TABLET, FILM COATED ORAL at 11:45

## 2018-09-21 RX ADMIN — AMLODIPINE BESYLATE 5 MILLIGRAM(S): 2.5 TABLET ORAL at 01:18

## 2018-09-21 RX ADMIN — PANTOPRAZOLE SODIUM 40 MILLIGRAM(S): 20 TABLET, DELAYED RELEASE ORAL at 16:50

## 2018-09-21 RX ADMIN — SODIUM CHLORIDE 3 MILLILITER(S): 9 INJECTION INTRAMUSCULAR; INTRAVENOUS; SUBCUTANEOUS at 05:44

## 2018-09-21 RX ADMIN — Medication 100 MILLIGRAM(S): at 05:52

## 2018-09-21 RX ADMIN — SODIUM CHLORIDE 80 MILLILITER(S): 9 INJECTION INTRAMUSCULAR; INTRAVENOUS; SUBCUTANEOUS at 05:42

## 2018-09-21 RX ADMIN — Medication 10 DROP(S): at 11:47

## 2018-09-21 RX ADMIN — Medication 100 MILLIEQUIVALENT(S): at 13:23

## 2018-09-21 RX ADMIN — SODIUM CHLORIDE 3 MILLILITER(S): 9 INJECTION INTRAMUSCULAR; INTRAVENOUS; SUBCUTANEOUS at 16:19

## 2018-09-21 RX ADMIN — Medication 100 MILLIGRAM(S): at 16:22

## 2018-09-21 RX ADMIN — Medication 100 MILLIEQUIVALENT(S): at 11:42

## 2018-09-21 RX ADMIN — ATENOLOL 50 MILLIGRAM(S): 25 TABLET ORAL at 16:49

## 2018-09-21 RX ADMIN — Medication 10 MILLIGRAM(S): at 16:22

## 2018-09-21 RX ADMIN — VALACYCLOVIR 1000 MILLIGRAM(S): 500 TABLET, FILM COATED ORAL at 05:43

## 2018-09-21 RX ADMIN — PANTOPRAZOLE SODIUM 40 MILLIGRAM(S): 20 TABLET, DELAYED RELEASE ORAL at 06:00

## 2018-09-21 RX ADMIN — Medication 100 MILLIGRAM(S): at 16:21

## 2018-09-21 RX ADMIN — Medication 100 MILLIEQUIVALENT(S): at 10:44

## 2018-09-21 RX ADMIN — Medication 60 MILLIGRAM(S): at 05:43

## 2018-09-21 RX ADMIN — Medication 100 MILLIGRAM(S): at 05:43

## 2018-09-21 NOTE — PROGRESS NOTE ADULT - ASSESSMENT
59 yo male with hx of lap sleeve gastrectomy with intra op EGD and hiatal hernia repair - 2/8/17- Dr. Garcia now admitted with aspiration pneumonia found on CT and elevated WBC. Will have upper EGD today.

## 2018-09-21 NOTE — PROGRESS NOTE ADULT - ASSESSMENT
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 V1 involvement, no changes in vision, discussed with Optho Dr. Nelson over phone on 9/19, continue current tx regimen, no role for prophylactic eye drops, f/u with Optho upon discharge    JOSÉ  - Non-compliant.  Pulmonary f/u appreciated    DM2  - Resolved after weight loss s/p gastric sleeve  - sliding scale being used (on prednisone)  - FS controlled, DC Sliding Scale    Left Sided CAP ? Aspiration  - Antibiotics - Levaquin and Flagyl  - Swallow eval noted - recommended dysphagia 3 soft solids with thin liquids  - Leukocytosis improving  - Repeat imaging in 6 weeks to ensure resolution, infiltrates were seen only on CT, not on CXR  - F/U with Dr. Zaman as outpatient    HTN.  - Continue with home medications with hold parameters, and Monitor BP  - elevated this morning, no HA/dizziness/CP  - DC IVF after EGD if tolerating PO (salt load may be contributing)  - add back his ARB, he is on HCTZ-ARB at home, hold HCTZ component today given hypokalemia    Hypokalemia  - no diarrhea or vomiting yesterday  - supplement IV   - check Mag tomorrow    Remote History of Bladder Cancer (around age 39)  - No hx smoking  - Was thought be be related to his Diet Coke intake (was drinking up to 1 liter a day)  - No gross hematuria  - No microscopic hematuria on UA  - Routine F/U with PCP Patient is 59 yo male with 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 V1 involvement, no changes in vision, discussed with Optho Dr. Nelson over phone on 9/19, continue current tx regimen, no role for prophylactic eye drops, f/u with Optho upon discharge    JOSÉ  - Non-compliant.  Pulmonary f/u appreciated    DM2  - Resolved after weight loss s/p gastric sleeve  - sliding scale being used (on prednisone)  - FS controlled, DC Sliding Scale    Left Sided CAP ? Aspiration  - Antibiotics - Levaquin and Flagyl  - Swallow eval noted - recommended dysphagia 3 soft solids with thin liquids  - Leukocytosis improving  - Repeat imaging in 6 weeks to ensure resolution, infiltrates were seen only on CT, not on CXR  - F/U with Dr. Zaman as outpatient    HTN.  - Continue with home medications with hold parameters, and Monitor BP  - elevated this morning, no HA/dizziness/CP  - DC IVF after EGD if tolerating PO (salt load may be contributing)  - add back his ARB, he is on HCTZ-ARB at home, hold HCTZ component today given hypokalemia    Hypokalemia  - no diarrhea or vomiting yesterday  - supplement IV   - check Mag tomorrow    Remote History of Bladder Cancer (around age 39)  - No hx smoking  - Was thought be be related to his Diet Coke intake (was drinking up to 1 liter a day)  - No gross hematuria  - No microscopic hematuria on UA  - Routine F/U with PCP    DVT PPx - Heparin SC Patient is 57 yo male with 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 V1 involvement, no changes in vision, discussed with Optho Dr. Nelson over phone on 9/19, continue current tx regimen, no role for prophylactic eye drops, f/u with Optho upon discharge    JOSÉ  - Non-compliant.  Pulmonary f/u appreciated    DM2  - Resolved after weight loss s/p gastric sleeve  - sliding scale being used (on prednisone)  - FS controlled, DC Sliding Scale    Left Sided CAP ? Aspiration  - Antibiotics - Levaquin and Flagyl  - Swallow eval noted - recommended dysphagia 3 soft solids with thin liquids  - Leukocytosis improving  - Repeat imaging in 6 weeks to ensure resolution, infiltrates were seen only on CT, not on CXR  - F/U with Dr. Zaman as outpatient    HTN.  - Continue with Atenolol  - elevated this morning, no HA/dizziness/CP  - DC IVF after EGD if tolerating PO (salt load may be contributing)  - add back his ARB, he is on HCTZ-ARB at home, hold HCTZ component today given hypokalemia    Hypokalemia  - no diarrhea or vomiting yesterday  - supplement IV   - check Mag tomorrow    Remote History of Bladder Cancer (around age 39)  - No hx smoking  - Was thought be be related to his Diet Coke intake (was drinking up to 1 liter a day)  - No gross hematuria  - No microscopic hematuria on UA  - Routine F/U with PCP    DVT PPx - Heparin SC

## 2018-09-21 NOTE — PROGRESS NOTE ADULT - NSHPATTENDINGPLANDISCUSS_GEN_ALL_CORE
patient in detail
D/w nursing  in detail
ALBA/nai mayfield and Dr. pires in detail

## 2018-09-21 NOTE — PROGRESS NOTE ADULT - PROBLEM SELECTOR PROBLEM 2
Vomiting in adult
Aspiration pneumonia of left lower lobe due to regurgitated food
Aspiration pneumonia of left lower lobe due to regurgitated food
Vomiting in adult

## 2018-09-21 NOTE — PROGRESS NOTE ADULT - PROBLEM SELECTOR PROBLEM 1
Vomiting in adult
Vomiting in adult
R/O Aspiration pneumonia of left lower lobe due to regurgitated food
R/O Aspiration pneumonia of left lower lobe due to regurgitated food

## 2018-09-21 NOTE — PROGRESS NOTE ADULT - REASON FOR ADMISSION
possible aspiration pneumonia
Patient is a 58y old  Male who presents with a chief complaint of PN (19 Sep 2018 08:54)
Patient is a 58y old  Male who presents with a chief complaint of Patient is a 58y old  Male who presents with a chief complaint of possible aspiration pneumonia (20 Sep 2018 07:58) (20 Sep 2018 10:32)
Patient is a 58y old  Male who presents with a chief complaint of possible aspiration pneumonia (20 Sep 2018 07:58)
pn

## 2018-09-21 NOTE — PROGRESS NOTE ADULT - ATTENDING COMMENTS
Agree with above.  Patient seen and examined.  Chest CT revealed left sided pneumonia patient seen by ID and pulmonary.  Continue antibiotics, incentive spirometer, pt refusing CPAP.  VE done today good flow through sleeve no delay of flow of contrast, reflux noted. Currently on an antibiotic regimen and O2 via nasal cannula. Pt continues to report feeling fatigue malaise , chest discomfort and right ear pain.   Possible herpes zoster right side of face - onset of new lesions on nose this am.- Currently on antivirals. No nausea or vomiting.  Denies any shortness of breath or increase in chest discomfort this am. No nausea or vomiting.  WBC 16 improved.
Agree with above note. Patient seen and examined. Tolerated clears yesterday.  EGD today and possible d/c home on antibiotics for left sided pneumonia.  PPI for reflux.  No eating or drinking close to bedtime.  Elevate head of bed.

## 2018-09-21 NOTE — PROGRESS NOTE ADULT - PROBLEM SELECTOR PLAN 1
Fu cxr
Resolved  Will trial soft foods after possible upper EGD
Resolved  Will trial soft foods after possible upper EGD
Fu cxr  See me in office after dc

## 2018-09-21 NOTE — PROGRESS NOTE ADULT - SUBJECTIVE AND OBJECTIVE BOX
INTERVAL HPI/OVERNIGHT EVENTS:   Patient seen and examined.  Ate some sherbert, jello last night, no solids yet.  No changes in vision.  Ear pain with significant improvement since first day of admission.  Minimal dry cough.  No fevers, sweats, dizziness, HA,   cp, palpitations, sob,  diarrhea, abd pain, dysuria, focal weakness, or calf pain.      REVIEW OF SYSTEMS:  See HPI,  all others negative    PHYSICAL EXAM:  Vital Signs Last 24 Hrs  T(C): 36.9 (21 Sep 2018 05:17), Max: 37.1 (20 Sep 2018 13:50)  T(F): 98.5 (21 Sep 2018 05:17), Max: 98.8 (20 Sep 2018 13:50)  HR: 56 (21 Sep 2018 05:17) (49 - 72)  BP: 170/83 (21 Sep 2018 05:17) (116/67 - 186/92)  BP(mean): --  RR: 18 (21 Sep 2018 05:17) (17 - 18)  SpO2: 96% (21 Sep 2018 05:17) (96% - 98%)    GENERAL: NAD, well-groomed, well-developed, awake, alert, oriented x 3, fluent and coherent speech  EYES: EOMI, PERRLA, conjunctiva and sclera clear  ENMT: No tonsillar erythema, exudates, or enlargement; Moist mucous membranes, Good dentition, No lesions             Right pinnna - decreased erythema and tenderness  NECK: Supple, No JVD, No Cervical LAD, No thyromegaly, No thyroid nodules felt  NERVOUS SYSTEM:  Good concentration; Moving all 4 extremities; No gross sensory deficits, No facial droop/paralysis  CHEST WALL: No masses  CHEST/LUNG: Clear to auscultation bilaterally; No rales, rhonchi, wheezing, or rubs  HEART: Regular rate and rhythm; No murmurs, rubs, or gallops  ABDOMEN: Soft, Nontender, obese, Bowel sounds present, No palpable masses or organomegaly, No bruits  EXTREMITIES:  2+ Peripheral Pulses, No clubbing, cyanosis, trace b/l LE  LYMPH: No lymphadenopathy   SKIN: scattered papules starting in front of right ear, along upper cheek, no lesions on forehead or lower face    LABS:                                   11.5   11.62 )-----------( 211      ( 21 Sep 2018 07:00 )             33.0     09-21    138  |  104  |  15  ----------------------------<  114<H>  3.2<L>   |  29  |  1.00    Ca    9.0      21 Sep 2018 07:00        Culture - Blood (collected 19 Sep 2018 11:56)  Source: .Blood Blood-Peripheral  Preliminary Report (20 Sep 2018 12:00):    No growth to date.    Culture - Blood (collected 19 Sep 2018 11:56)  Source: .Blood Blood-Peripheral  Preliminary Report (20 Sep 2018 12:00):    No growth to date.    Culture - Urine (collected 18 Sep 2018 21:03)  Source: .Urine Clean Catch (Midstream)  Final Report (19 Sep 2018 23:21):    No growth

## 2018-09-21 NOTE — PROGRESS NOTE ADULT - ASSESSMENT
Pt with recurrent regurgitation, now presents with pneumonia, most likely aspiration.  Improved clinically.  Has JOSÉ but noncompliant with cpap.  Possible zoster face.  Anemia.

## 2018-09-21 NOTE — PROGRESS NOTE ADULT - SUBJECTIVE AND OBJECTIVE BOX
PULMONARY/CRITICAL CARE      INTERVAL HPI/OVERNIGHT EVENTS:    58y MaleHPI:  Feels better. No abd pain. No sob. No fever. No cough. Tolerated diet yest.      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  WANTS TO BE POSITIONED ON SIDE WITH 2 TOWELS UNDER HEAD  Febrile Seizure: during childhood  Morbidly Obese  Bladder Cancer: since 2003, tumor removed and now reportedly in remission  Diabetes Mellitus Type II  HTN (Hypertension)  S/P laparoscopic sleeve gastrectomy  S/P cystoscopy: 2003- removal of bladder tumor 2003  S/P Cystoscopy: every year;  History of Arthroscopic Knee Surgery: right        ICU Vital Signs Last 24 Hrs  T(C): 36.9 (21 Sep 2018 05:17), Max: 37.1 (20 Sep 2018 13:50)  T(F): 98.5 (21 Sep 2018 05:17), Max: 98.8 (20 Sep 2018 13:50)  HR: 56 (21 Sep 2018 05:17) (49 - 72)  BP: 170/83 (21 Sep 2018 05:17) (116/67 - 186/92)  BP(mean): --  ABP: --  ABP(mean): --  RR: 18 (21 Sep 2018 05:17) (17 - 18)  SpO2: 96% (21 Sep 2018 05:17) (96% - 98%)    Qtts:     I&O's Summary    20 Sep 2018 07:01  -  21 Sep 2018 07:00  --------------------------------------------------------  IN: 1360 mL / OUT: 0 mL / NET: 1360 mL            REVIEW OF SYSTEMS:    CONSTITUTIONAL: No fever, weight loss, or fatigue  EYES: No eye pain, visual disturbances, or discharge  ENMT:  No difficulty hearing, tinnitus, vertigo; No sinus or throat pain  NECK: No pain or stiffness  BREASTS: No pain, masses, or nipple discharge  RESPIRATORY: No cough, wheezing, chills or hemoptysis; No shortness of breath  CARDIOVASCULAR: No chest pain, palpitations, dizziness, or leg swelling  GASTROINTESTINAL: No abdominal or epigastric pain. No nausea, vomiting, or hematemesis; No diarrhea or constipation. No melena or hematochezia.  GENITOURINARY: No dysuria, frequency, hematuria, or incontinence  NEUROLOGICAL: No headaches, memory loss, loss of strength, numbness, or tremors        PHYSICAL EXAM:    GENERAL: NAD, well-groomed, well-developed, NAD  HEAD:  Atraumatic, Normocephalic  EYES: EOMI, PERRLA, conjunctiva and sclera clear  ENMT: No tonsillar erythema, exudates, or enlargement; Moist mucous membranes, Good dentition, No lesions  NECK: Supple, No JVD, Normal thyroid  NERVOUS SYSTEM:  Alert & Oriented X3, Good concentration; Motor Strength 5/5 B/L upper and lower extremities  CHEST/LUNG: Clear to percussion bilaterally; No rales, rhonchi, wheezing, or rubs  HEART: Regular rate and rhythm; No murmurs, rubs, or gallops  ABDOMEN: Soft, Nontender, Nondistended; Bowel sounds present  EXTREMITIES:  2+ Peripheral Pulses, No clubbing, cyanosis, or edema  LYMPH: No lymphadenopathy noted  SKIN: No rashes or lesions        LABS:                        11.5   11.62 )-----------( 211      ( 21 Sep 2018 07:00 )             33.0     09-21    138  |  104  |  15  ----------------------------<  114<H>  3.2<L>   |  29  |  1.00    Ca    9.0      21 Sep 2018 07:00            vanco through     RADIOLOGY & ADDITIONAL STUDIES:    < from: Xray Esophagram (09.19.18 @ 10:13) >  EXAM:  ESOPHAGUS                                  PROCEDURE DATE:  09/19/2018          INTERPRETATION:  History: status post gastric sleeve 2/17, vomiting and   reflux symptoms.     radiographs demonstrate nonspecific bowel gas pattern. Patient   ingested dilute barium with no difficulty. There is no evidence of reflux   or strictures.  Ingested contrast flowed unimpeded through the narrow gastric   pouch/gastric sleeve. There was no leak or extravasation. There was no   holdup of contrastat the surgical site.  Contrast extended through the   proximal bowel, which is grossly unremarkable.      Impression:   Status post sleeve gastrectomy. No evidence of obstruction or   extravasation. No gastroesophageal reflux was noted during this exam.    < end of copied text >    CRITICAL CARE TIME SPENT:

## 2018-09-21 NOTE — PROGRESS NOTE ADULT - SUBJECTIVE AND OBJECTIVE BOX
57 yo male with hx of s/p lap sleeve gastrectomy with intra op EGD and hiatal hernia repair - 2/8/17- Dr. Garcia presented to ED with vomiting and now admitted with aspiration pneumonia found on CT and elevated WBC. He continues to get IV antibiotics.   He is tolerating clear liquid diet and denies nausea or vomiting. He denies acid reflux symptoms.     VITALS:  Vital Signs Last 24 Hrs  T(C): 36.9 (21 Sep 2018 05:17), Max: 37.1 (20 Sep 2018 13:50)  T(F): 98.5 (21 Sep 2018 05:17), Max: 98.8 (20 Sep 2018 13:50)  HR: 56 (21 Sep 2018 05:17) (49 - 72)  BP: 170/83 (21 Sep 2018 05:17) (116/67 - 186/92)  BP(mean): --  RR: 18 (21 Sep 2018 05:17) (17 - 18)  SpO2: 96% (21 Sep 2018 05:17) (96% - 98%)        09-20-18 @ 07:01  -  09-21-18 @ 07:00  --------------------------------------------------------  IN: 1360 mL / OUT: 0 mL / NET: 1360 mL        LABS:                          11.5   11.62 )-----------( 211      ( 21 Sep 2018 07:00 )             33.0     09-21    138  |  104  |  15  ----------------------------<  114<H>  3.2<L>   |  29  |  1.00    Ca    9.0      21 Sep 2018 07:00        CAPILLARY BLOOD GLUCOSE      POCT Blood Glucose.: 97 mg/dL (21 Sep 2018 05:53)  POCT Blood Glucose.: 130 mg/dL (21 Sep 2018 00:19)  POCT Blood Glucose.: 143 mg/dL (20 Sep 2018 17:35)  POCT Blood Glucose.: 148 mg/dL (20 Sep 2018 12:14)        Physical Exam:  General: Alert & Oriented x 3.  NAD, resting comfortably in bed.    Abdominal: Soft - Non tender - No swelling noted.   Extremities: No swelling/ edema / erythema noted bilateral upper / lower extremities.

## 2018-09-21 NOTE — PROGRESS NOTE ADULT - PROBLEM SELECTOR PLAN 2
?egd
Upper EGD to assess for an anatomical causes for reflux symptoms  Continue IV hydration and antibiotics
Upper EGD to assess for an anatomical causes for reflux symptoms  Continue IV hydration and antibiotics
EGD today--as stable as possible

## 2018-09-21 NOTE — PROGRESS NOTE ADULT - PROVIDER SPECIALTY LIST ADULT
Bariatric Surgery
Gastroenterology
Hospitalist
Infectious Disease
Infectious Disease
Pulmonology
Pulmonology

## 2018-09-21 NOTE — PROGRESS NOTE ADULT - SUBJECTIVE AND OBJECTIVE BOX
ID progress note covering Dr Rj Dill    Name: DEWAYNE NARAYANAN  Age: 58y  Gender: Male  MRN: [id number]    Interval History-- Events noted, doing well. Tolerating solids. For EGD today. No nausea   or vomiting . Afebrile.    Past Medical History--  Allergy  Nevus comedonicus of face  Cholecystitis  Obstructive Sleep Apnea  Herniated Disc  Febrile Seizure  Morbidly Obese  Bladder Cancer  Diabetes Mellitus Type II  Hyperlipidemia  HTN (Hypertension)  S/P laparoscopic sleeve gastrectomy  S/P cystoscopy  S/P Cystoscopy  History of Arthroscopic Knee Surgery      For details regarding the patient's social history, family history, and other miscellaneous elements, please refer the initial infectious diseases consultation and/or the admitting history and physical examination for this admission.    Allergies--  penicillins (Rash)  STARCH used in bedding (Hives; Rash)    Medications--  MEDICATIONS  (STANDING):  ATENolol  Tablet 50 milliGRAM(s) Oral two times a day  docusate sodium 100 milliGRAM(s) Oral three times a day  heparin  Injectable 5000 Unit(s) SubCutaneous every 12 hours  levoFLOXacin IVPB      levoFLOXacin IVPB 750 milliGRAM(s) IV Intermittent every 24 hours  losartan 50 milliGRAM(s) Oral daily  metoclopramide 10 milliGRAM(s) Oral three times a day  metroNIDAZOLE  IVPB 500 milliGRAM(s) IV Intermittent every 8 hours  metroNIDAZOLE  IVPB      ofloxacin 0.3% Solution 10 Drop(s) Right Ear daily  pantoprazole  Injectable 40 milliGRAM(s) IV Push two times a day  potassium chloride  10 mEq/100 mL IVPB 10 milliEquivalent(s) IV Intermittent every 1 hour  predniSONE   Tablet 60 milliGRAM(s) Oral daily  sodium chloride 0.9% lock flush 3 milliLiter(s) IV Push every 8 hours  sodium chloride 0.9%. 1000 milliLiter(s) (80 mL/Hr) IV Continuous <Continuous>  valACYclovir 1000 milliGRAM(s) Oral every 8 hours    MEDICATIONS  (PRN):  acetaminophen   Tablet .. 650 milliGRAM(s) Oral every 6 hours PRN Mild Pain (1 - 3)  ondansetron Injectable 4 milliGRAM(s) IV Push every 6 hours PRN Nausea  senna 2 Tablet(s) Oral at bedtime PRN Constipation  traMADol 25 milliGRAM(s) Oral every 6 hours PRN Moderate Pain (4 - 6)  traMADol 50 milliGRAM(s) Oral every 6 hours PRN Severe Pain (7 - 10)      Review of Systems--  A 10-point review of systems was obtained.     Pertinent positives and negatives--  Constitutional: No fevers. No Chills. No Rigors.   Cardiovascular: No chest pain. No palpitations.  Respiratory: No shortness of breath. No cough.  Gastrointestinal: No nausea or vomiting. No diarrhea or constipation.   Psychiatric: Pleasant. Appropriate affect.    Review of systems otherwise negative except as previously noted.    Physical Examination--  Vital Signs: T(F): 97.9 (09-20-18 @ 05:30), Max: 98.4 (09-19-18 @ 21:14)  HR: 60 (09-20-18 @ 05:30)  BP: 179/81 (09-20-18 @ 05:30)  RR: 16 (09-20-18 @ 05:30)  SpO2: 95% (09-20-18 @ 05:30)  Wt(kg): --  General: Nontoxic-appearing Male in no acute distress.  HEENT: AT/NC. PERRL. EOMI. Anicteric. Conjunctiva pink and moist. Oropharynx clear. Dentition fair.  Neck: Not rigid. No sense of mass.  Nodes: None palpable.  Lungs: Clear bilaterally without rales, wheezing or rhonchi  Heart: Regular rate and rhythm. No Murmur. No rub. No gallop. No palpable thrill.  Abdomen: Bowel sounds present and normoactive. Soft. Nondistended. Nontender. No sense of mass. No organomegaly.  Back: No spinal tenderness. No costovertebral angle tenderness.   Extremities: No cyanosis or clubbing. No edema.   Skin: Warm. Dry. Good turgor. No vasculitic stigmata. No new vesicles   Psychiatric: Appropriate affect and mood for situation.         Laboratory Studies--                        11.5   11.62 )-----------( 211      ( 21 Sep 2018 07:00 )             33.0   09-21    138  |  104  |  15  ----------------------------<  114<H>  3.2<L>   |  29  |  1.00    Ca    9.0      21 Sep 2018 07:00        Culture Data    Culture - Urine (collected 18 Sep 2018 21:03)  Source: .Urine Clean Catch (Midstream)  Final Report (19 Sep 2018 23:21):    No growth    RADIOLOGY:  Xray Esophagram (09.19.18 @ 10:13) >     radiographs demonstrate nonspecific bowel gas pattern. Patient   ingested dilute barium with no difficulty. There is no evidence of reflux   or strictures.  Ingested contrast flowed unimpeded through the narrow gastric   pouch/gastric sleeve. There was no leak or extravasation. There was no   holdup of contrastat the surgical site.  Contrast extended through the   proximal bowel, which is grossly unremarkable.      Impression:   Status post sleeve gastrectomy. No evidence of obstruction or   extravasation. No gastroesophageal reflux was noted during this exam.    CT Chest No Cont (09.18.18 @ 19:40) >  Extensive airspace infiltrates in the left upper and lower lobe   consistent with pneumonia. Please image to resolution.  No pleural or pericardial effusion.  Central airways patent. No mediastinal adenopathy. Lack of IV contrast   limits evaluation of the may. Nonaneurysmal thoracic aorta.  Normal heart size.  No acute abnormality upper abdomen. Status post gastric surgery. Right   renal cyst.  Advanced degenerative spondylosis dorsal spine    Impression:    Left upper and left lower lobe pneumonia. Please image to resolution.     Xray Chest 1 View AP/PA (09.18.18 @ 16:11) >  INTERPRETATION:  AP semierect chest on September 18, 2018 at 3:59 PM.   Patient has vomiting and concern is aspiration. COMPARISON: None   available.    The heart is magnified by technique.    The lung fields and pleural surfaces are unremarkable.     IMPRESSION: No infiltrate.      Assessment :   57 yo male with hx of s/p lap sleeve gastrectomy with intra op EGD and hiatal hernia repair -   2/8/17- Dr. Garcia  presenting with N/V, and reflex symptoms that started this morning.  Pt   reports with worsening nocturnal reflux symptoms  and regurgitation of food-  > 3  months,   He likely has recurrent aspiration pneumonia . He has hx of severe GERD , last EGD was in   April 2018   He has pain and vesicles on right side of face likely has Zoster with post herpetic neuralgia   He has hx of bladder cancer so he he has recurrent hematuria he should have urology follow up   Overall doing well.  For EGD today    Plan :   - will continue with  Levaquin 750 mg daily with Flagyl 500 mg q 8 , change to po after EGD and   for dc  , needs total 7 days   - complete 7 dyas of valtrex. may benefit from use of Neurontin  - trend CBC  - aspiration precautions   - stable from ID standpoint    Continue with present regime .  Appropriate use of antibiotics and adverse effects reviewed.    I have discussed the above plan of care with patient in detail. he  expressed understanding of the treatment plan . Risks, benefits and alternatives discussed in detail. I have asked if He has  any questions or concerns and appropriately addressed them to the best of my ability .      > 35 minutes spent in direct patient care reviewing  the notes, lab data/ imaging , discussion with multidisciplinary team. All questions were addressed and answered to the best of my capacity .    Thank you for allowing me to participate in the care of your patient .

## 2018-09-24 ENCOUNTER — MESSAGE (OUTPATIENT)
Age: 58
End: 2018-09-24

## 2018-09-24 LAB
CULTURE RESULTS: SIGNIFICANT CHANGE UP
CULTURE RESULTS: SIGNIFICANT CHANGE UP
SPECIMEN SOURCE: SIGNIFICANT CHANGE UP
SPECIMEN SOURCE: SIGNIFICANT CHANGE UP

## 2018-09-25 LAB — SURGICAL PATHOLOGY FINAL REPORT - CH: SIGNIFICANT CHANGE UP

## 2018-09-27 ENCOUNTER — APPOINTMENT (OUTPATIENT)
Dept: INTERNAL MEDICINE | Facility: CLINIC | Age: 58
End: 2018-09-27
Payer: MEDICARE

## 2018-09-27 ENCOUNTER — LABORATORY RESULT (OUTPATIENT)
Age: 58
End: 2018-09-27

## 2018-09-27 VITALS — BODY MASS INDEX: 39.38 KG/M2 | WEIGHT: 214 LBS | HEIGHT: 62 IN

## 2018-09-27 VITALS — DIASTOLIC BLOOD PRESSURE: 70 MMHG | SYSTOLIC BLOOD PRESSURE: 130 MMHG

## 2018-09-27 LAB
BILIRUB UR QL STRIP: NORMAL
CLARITY UR: NORMAL
COLLECTION METHOD: NORMAL
GLUCOSE UR-MCNC: NORMAL
HCG UR QL: 0.2 EU/DL
HGB UR QL STRIP.AUTO: NORMAL
KETONES UR-MCNC: NORMAL
LEUKOCYTE ESTERASE UR QL STRIP: NORMAL
NITRITE UR QL STRIP: NORMAL
PH UR STRIP: 5.5
PROT UR STRIP-MCNC: 30
SP GR UR STRIP: 1.02

## 2018-09-27 PROCEDURE — 99495 TRANSJ CARE MGMT MOD F2F 14D: CPT | Mod: 25

## 2018-09-27 PROCEDURE — 81003 URINALYSIS AUTO W/O SCOPE: CPT | Mod: QW

## 2018-09-27 PROCEDURE — 36415 COLL VENOUS BLD VENIPUNCTURE: CPT

## 2018-09-27 RX ORDER — DOXYCYCLINE HYCLATE 100 MG/1
100 CAPSULE ORAL DAILY
Qty: 7 | Refills: 0 | Status: DISCONTINUED | COMMUNITY
Start: 2018-09-07 | End: 2018-09-27

## 2018-09-27 NOTE — REVIEW OF SYSTEMS
[Earache] : earache [Hearing Loss] : no hearing loss [Sore Throat] : no sore throat [Negative] : Integumentary

## 2018-09-27 NOTE — HISTORY OF PRESENT ILLNESS
[de-identified] : Assessment patient who is 58 years old with a history of a gastric bypass who recently was hospitalized for aspiration pneumonia and an aspiration syndrome secondary to his bypass his endoscopy revealed a stricture of the esophagus which was dilated in addition he was treated with IV antibiotics for his pneumonia he presently feels well except for pain in his right ear.

## 2018-09-27 NOTE — ASSESSMENT
[FreeTextEntry1] : His physical examination was normal except for a right otitis externa. The patient was referred to gastroenterology and urology to assess his bladder carcinoma.

## 2018-09-27 NOTE — PHYSICAL EXAM
[Normal Sclera/Conjunctiva] : normal sclera/conjunctiva [PERRL] : pupils equal round and reactive to light [No Respiratory Distress] : no respiratory distress  [Clear to Auscultation] : lungs were clear to auscultation bilaterally [No Accessory Muscle Use] : no accessory muscle use [Normal Rate] : normal rate  [Regular Rhythm] : with a regular rhythm [Normal S1, S2] : normal S1 and S2 [Soft] : abdomen soft [Non Tender] : non-tender [No HSM] : no HSM [Normal Bowel Sounds] : normal bowel sounds [No Joint Swelling] : no joint swelling [Grossly Normal Strength/Tone] : grossly normal strength/tone [de-identified] : Right otitis externa

## 2018-09-28 LAB
25(OH)D3 SERPL-MCNC: 55.1 NG/ML
ALBUMIN SERPL ELPH-MCNC: 4.5 G/DL
ALP BLD-CCNC: 59 U/L
ALT SERPL-CCNC: 23 U/L
ANION GAP SERPL CALC-SCNC: 13 MMOL/L
AST SERPL-CCNC: 29 U/L
BASOPHILS # BLD AUTO: 0.03 K/UL
BASOPHILS NFR BLD AUTO: 0.3 %
BILIRUB SERPL-MCNC: 0.5 MG/DL
BUN SERPL-MCNC: 20 MG/DL
CALCIUM SERPL-MCNC: 9.8 MG/DL
CHLORIDE SERPL-SCNC: 97 MMOL/L
CHOLEST SERPL-MCNC: 123 MG/DL
CHOLEST/HDLC SERPL: 2.7 RATIO
CO2 SERPL-SCNC: 31 MMOL/L
CREAT SERPL-MCNC: 0.64 MG/DL
EOSINOPHIL # BLD AUTO: 0.35 K/UL
EOSINOPHIL NFR BLD AUTO: 3.6 %
GLUCOSE SERPL-MCNC: 101 MG/DL
HBA1C MFR BLD HPLC: 6 %
HCT VFR BLD CALC: 41 %
HDLC SERPL-MCNC: 45 MG/DL
HGB BLD-MCNC: 14 G/DL
IMM GRANULOCYTES NFR BLD AUTO: 0.4 %
LDLC SERPL CALC-MCNC: 60 MG/DL
LYMPHOCYTES # BLD AUTO: 2.27 K/UL
LYMPHOCYTES NFR BLD AUTO: 23.1 %
MAN DIFF?: NORMAL
MCHC RBC-ENTMCNC: 29.7 PG
MCHC RBC-ENTMCNC: 34.1 GM/DL
MCV RBC AUTO: 87 FL
MONOCYTES # BLD AUTO: 1.27 K/UL
MONOCYTES NFR BLD AUTO: 12.9 %
NEUTROPHILS # BLD AUTO: 5.85 K/UL
NEUTROPHILS NFR BLD AUTO: 59.7 %
PLATELET # BLD AUTO: 315 K/UL
POTASSIUM SERPL-SCNC: 3.8 MMOL/L
PROT SERPL-MCNC: 7.5 G/DL
RBC # BLD: 4.71 M/UL
RBC # FLD: 14.5 %
SAVE SPECIMEN: NORMAL
SODIUM SERPL-SCNC: 141 MMOL/L
T3RU NFR SERPL: 0.95 INDEX
T4 SERPL-MCNC: 9.9 UG/DL
TRIGL SERPL-MCNC: 92 MG/DL
TSH SERPL-ACNC: 0.54 UIU/ML
URATE SERPL-MCNC: 7.4 MG/DL
WBC # FLD AUTO: 9.81 K/UL

## 2018-10-09 ENCOUNTER — APPOINTMENT (OUTPATIENT)
Dept: ULTRASOUND IMAGING | Facility: IMAGING CENTER | Age: 58
End: 2018-10-09
Payer: MEDICARE

## 2018-10-09 ENCOUNTER — OUTPATIENT (OUTPATIENT)
Dept: OUTPATIENT SERVICES | Facility: HOSPITAL | Age: 58
LOS: 1 days | End: 2018-10-09
Payer: MEDICARE

## 2018-10-09 DIAGNOSIS — Z00.8 ENCOUNTER FOR OTHER GENERAL EXAMINATION: ICD-10-CM

## 2018-10-09 DIAGNOSIS — Z98.84 BARIATRIC SURGERY STATUS: Chronic | ICD-10-CM

## 2018-10-09 DIAGNOSIS — Z98.890 OTHER SPECIFIED POSTPROCEDURAL STATES: Chronic | ICD-10-CM

## 2018-10-09 PROCEDURE — 76770 US EXAM ABDO BACK WALL COMP: CPT | Mod: 26

## 2018-10-09 PROCEDURE — 76770 US EXAM ABDO BACK WALL COMP: CPT

## 2018-10-11 ENCOUNTER — APPOINTMENT (OUTPATIENT)
Dept: BARIATRICS | Facility: CLINIC | Age: 58
End: 2018-10-11
Payer: MEDICARE

## 2018-10-11 VITALS
BODY MASS INDEX: 39.38 KG/M2 | OXYGEN SATURATION: 95 % | SYSTOLIC BLOOD PRESSURE: 136 MMHG | DIASTOLIC BLOOD PRESSURE: 74 MMHG | HEIGHT: 62 IN | HEART RATE: 64 BPM | WEIGHT: 214 LBS

## 2018-10-11 PROCEDURE — 99214 OFFICE O/P EST MOD 30 MIN: CPT

## 2018-10-25 PROCEDURE — 80048 BASIC METABOLIC PNL TOTAL CA: CPT

## 2018-10-25 PROCEDURE — 74220 X-RAY XM ESOPHAGUS 1CNTRST: CPT

## 2018-10-25 PROCEDURE — 99285 EMERGENCY DEPT VISIT HI MDM: CPT | Mod: 25

## 2018-10-25 PROCEDURE — 83690 ASSAY OF LIPASE: CPT

## 2018-10-25 PROCEDURE — 71046 X-RAY EXAM CHEST 2 VIEWS: CPT

## 2018-10-25 PROCEDURE — 36415 COLL VENOUS BLD VENIPUNCTURE: CPT

## 2018-10-25 PROCEDURE — 93005 ELECTROCARDIOGRAM TRACING: CPT

## 2018-10-25 PROCEDURE — 96365 THER/PROPH/DIAG IV INF INIT: CPT

## 2018-10-25 PROCEDURE — 84484 ASSAY OF TROPONIN QUANT: CPT

## 2018-10-25 PROCEDURE — 87040 BLOOD CULTURE FOR BACTERIA: CPT

## 2018-10-25 PROCEDURE — 87086 URINE CULTURE/COLONY COUNT: CPT

## 2018-10-25 PROCEDURE — 88305 TISSUE EXAM BY PATHOLOGIST: CPT

## 2018-10-25 PROCEDURE — 80053 COMPREHEN METABOLIC PANEL: CPT

## 2018-10-25 PROCEDURE — 82962 GLUCOSE BLOOD TEST: CPT

## 2018-10-25 PROCEDURE — 82746 ASSAY OF FOLIC ACID SERUM: CPT

## 2018-10-25 PROCEDURE — 82607 VITAMIN B-12: CPT

## 2018-10-25 PROCEDURE — 81001 URINALYSIS AUTO W/SCOPE: CPT

## 2018-10-25 PROCEDURE — 71045 X-RAY EXAM CHEST 1 VIEW: CPT

## 2018-10-25 PROCEDURE — 83540 ASSAY OF IRON: CPT

## 2018-10-25 PROCEDURE — 71250 CT THORAX DX C-: CPT

## 2018-10-25 PROCEDURE — 83605 ASSAY OF LACTIC ACID: CPT

## 2018-10-25 PROCEDURE — 96368 THER/DIAG CONCURRENT INF: CPT

## 2018-10-25 PROCEDURE — 94660 CPAP INITIATION&MGMT: CPT

## 2018-10-25 PROCEDURE — C1726: CPT

## 2018-10-25 PROCEDURE — 88312 SPECIAL STAINS GROUP 1: CPT

## 2018-10-25 PROCEDURE — 85027 COMPLETE CBC AUTOMATED: CPT

## 2018-10-29 ENCOUNTER — APPOINTMENT (OUTPATIENT)
Dept: INTERNAL MEDICINE | Facility: CLINIC | Age: 58
End: 2018-10-29
Payer: MEDICARE

## 2018-10-29 VITALS — WEIGHT: 220 LBS | BODY MASS INDEX: 40.48 KG/M2 | HEIGHT: 62 IN

## 2018-10-29 PROCEDURE — 99214 OFFICE O/P EST MOD 30 MIN: CPT | Mod: 25

## 2018-10-29 PROCEDURE — 90686 IIV4 VACC NO PRSV 0.5 ML IM: CPT

## 2018-10-29 PROCEDURE — G0008: CPT

## 2018-10-29 NOTE — REVIEW OF SYSTEMS
[Fatigue] : fatigue [Vision Problems] : vision problems [Hearing Loss] : hearing loss [Dyspnea on Exertion] : dyspnea on exertion [Hesitancy] : hesitancy [Nocturia] : nocturia [Frequency] : frequency [Joint Pain] : joint pain [Muscle Pain] : muscle pain [Back Pain] : back pain [Joint Swelling] : joint swelling [Negative] : Cardiovascular

## 2018-10-29 NOTE — ASSESSMENT
[FreeTextEntry1] : The patient's blood pressure was 130/70. His physical examination was positive for obesity. 2 flu shot was given

## 2018-10-29 NOTE — PHYSICAL EXAM
[Normal Outer Ear/Nose] : the outer ears and nose were normal in appearance [Normal Oropharynx] : the oropharynx was normal [Supple] : supple [No Lymphadenopathy] : no lymphadenopathy [No Respiratory Distress] : no respiratory distress  [Clear to Auscultation] : lungs were clear to auscultation bilaterally [No Accessory Muscle Use] : no accessory muscle use [Soft] : abdomen soft [Non Tender] : non-tender [No HSM] : no HSM [Normal Bowel Sounds] : normal bowel sounds

## 2018-10-29 NOTE — HISTORY OF PRESENT ILLNESS
[de-identified] : This a patient with a history of bariatric surgery for obesity, hypertension, diabetes, hypercholesterolemia who is here today for a followup visit.

## 2018-12-04 ENCOUNTER — NON-APPOINTMENT (OUTPATIENT)
Age: 58
End: 2018-12-04

## 2018-12-04 ENCOUNTER — APPOINTMENT (OUTPATIENT)
Dept: INTERNAL MEDICINE | Facility: CLINIC | Age: 58
End: 2018-12-04
Payer: MEDICARE

## 2018-12-04 ENCOUNTER — LABORATORY RESULT (OUTPATIENT)
Age: 58
End: 2018-12-04

## 2018-12-04 LAB
BASOPHILS # BLD AUTO: 0.03 K/UL
BASOPHILS NFR BLD AUTO: 0.3 %
BILIRUB UR QL STRIP: NEGATIVE
CLARITY UR: CLEAR
COLLECTION METHOD: NORMAL
DATE COLLECTED: NORMAL
EOSINOPHIL # BLD AUTO: 0.26 K/UL
EOSINOPHIL NFR BLD AUTO: 2.7 %
GLUCOSE UR-MCNC: NEGATIVE
HCG UR QL: 0.2 EU/DL
HCT VFR BLD CALC: 38.4 %
HEMOCCULT SP1 STL QL: NEGATIVE
HGB BLD-MCNC: 13.1 G/DL
HGB UR QL STRIP.AUTO: NEGATIVE
IMM GRANULOCYTES NFR BLD AUTO: 0.1 %
KETONES UR-MCNC: NEGATIVE
LEUKOCYTE ESTERASE UR QL STRIP: NORMAL
LYMPHOCYTES # BLD AUTO: 1.45 K/UL
LYMPHOCYTES NFR BLD AUTO: 14.8 %
MAN DIFF?: NORMAL
MCHC RBC-ENTMCNC: 29.5 PG
MCHC RBC-ENTMCNC: 34.1 GM/DL
MCV RBC AUTO: 86.5 FL
MONOCYTES # BLD AUTO: 0.99 K/UL
MONOCYTES NFR BLD AUTO: 10.1 %
NEUTROPHILS # BLD AUTO: 7.07 K/UL
NEUTROPHILS NFR BLD AUTO: 72 %
NITRITE UR QL STRIP: POSITIVE
PH UR STRIP: 5.5
PLATELET # BLD AUTO: 240 K/UL
PROT UR STRIP-MCNC: 100
QUALITY CONTROL: YES
RBC # BLD: 4.44 M/UL
RBC # FLD: 14.5 %
SAVE SPECIMEN: NORMAL
SP GR UR STRIP: 1.02
WBC # FLD AUTO: 9.81 K/UL

## 2018-12-04 PROCEDURE — 81003 URINALYSIS AUTO W/O SCOPE: CPT | Mod: QW

## 2018-12-04 PROCEDURE — 36415 COLL VENOUS BLD VENIPUNCTURE: CPT

## 2018-12-04 PROCEDURE — 93000 ELECTROCARDIOGRAM COMPLETE: CPT | Mod: 59

## 2018-12-04 PROCEDURE — G0439: CPT | Mod: 25

## 2018-12-05 LAB
25(OH)D3 SERPL-MCNC: 41.4 NG/ML
ALBUMIN SERPL ELPH-MCNC: 4.1 G/DL
ALP BLD-CCNC: 76 U/L
ALT SERPL-CCNC: 15 U/L
ANION GAP SERPL CALC-SCNC: 13 MMOL/L
AST SERPL-CCNC: 15 U/L
BILIRUB SERPL-MCNC: 0.5 MG/DL
BUN SERPL-MCNC: 23 MG/DL
CALCIUM SERPL-MCNC: 9.9 MG/DL
CHLORIDE SERPL-SCNC: 100 MMOL/L
CHOLEST SERPL-MCNC: 138 MG/DL
CHOLEST/HDLC SERPL: 3 RATIO
CO2 SERPL-SCNC: 28 MMOL/L
CREAT SERPL-MCNC: 0.96 MG/DL
FERRITIN SERPL-MCNC: 39 NG/ML
FOLATE SERPL-MCNC: 14.6 NG/ML
HBA1C MFR BLD HPLC: 5.8 %
HDLC SERPL-MCNC: 46 MG/DL
LDLC SERPL CALC-MCNC: 67 MG/DL
POTASSIUM SERPL-SCNC: 3.6 MMOL/L
PROT SERPL-MCNC: 7.1 G/DL
PSA SERPL-MCNC: 1.22 NG/ML
SODIUM SERPL-SCNC: 141 MMOL/L
T3RU NFR SERPL: 1.05 INDEX
T4 SERPL-MCNC: 6.7 UG/DL
TRIGL SERPL-MCNC: 123 MG/DL
TSH SERPL-ACNC: 0.74 UIU/ML
URATE SERPL-MCNC: 5.5 MG/DL
VIT B12 SERPL-MCNC: 1155 PG/ML

## 2018-12-06 LAB — GLUCOSE SERPL-MCNC: 108 MG/DL

## 2018-12-10 ENCOUNTER — RX RENEWAL (OUTPATIENT)
Age: 58
End: 2018-12-10

## 2018-12-10 LAB — BACTERIA UR CULT: ABNORMAL

## 2018-12-13 ENCOUNTER — APPOINTMENT (OUTPATIENT)
Dept: BARIATRICS | Facility: CLINIC | Age: 58
End: 2018-12-13
Payer: MEDICARE

## 2018-12-13 VITALS
WEIGHT: 222 LBS | SYSTOLIC BLOOD PRESSURE: 136 MMHG | DIASTOLIC BLOOD PRESSURE: 82 MMHG | OXYGEN SATURATION: 97 % | HEART RATE: 67 BPM | BODY MASS INDEX: 40.85 KG/M2 | HEIGHT: 62 IN

## 2018-12-13 DIAGNOSIS — R11.10 VOMITING, UNSPECIFIED: ICD-10-CM

## 2018-12-13 PROCEDURE — 99214 OFFICE O/P EST MOD 30 MIN: CPT

## 2019-01-28 ENCOUNTER — APPOINTMENT (OUTPATIENT)
Dept: INTERNAL MEDICINE | Facility: CLINIC | Age: 59
End: 2019-01-28

## 2019-02-14 ENCOUNTER — APPOINTMENT (OUTPATIENT)
Dept: BARIATRICS | Facility: CLINIC | Age: 59
End: 2019-02-14
Payer: MEDICARE

## 2019-02-14 VITALS
WEIGHT: 220.46 LBS | DIASTOLIC BLOOD PRESSURE: 90 MMHG | HEART RATE: 61 BPM | HEIGHT: 62 IN | BODY MASS INDEX: 40.57 KG/M2 | SYSTOLIC BLOOD PRESSURE: 158 MMHG | OXYGEN SATURATION: 96 %

## 2019-02-14 DIAGNOSIS — Z87.898 PERSONAL HISTORY OF OTHER SPECIFIED CONDITIONS: ICD-10-CM

## 2019-02-14 DIAGNOSIS — H60.90 UNSPECIFIED OTITIS EXTERNA, UNSPECIFIED EAR: ICD-10-CM
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
## 2019-02-14 DIAGNOSIS — Z87.2 PERSONAL HISTORY OF DISEASES OF THE SKIN AND SUBCUTANEOUS TISSUE: ICD-10-CM

## 2019-02-14 PROCEDURE — 99214 OFFICE O/P EST MOD 30 MIN: CPT

## 2019-02-14 NOTE — ASSESSMENT
[FreeTextEntry1] : 2 year status post laparoscopic sleeve gastrectomy.  Down 2 pounds from previous encounter however weight loss appears to have plateaued. The patient's activity is limited significantly by chronic lumbar arthropathies and herniated discs. Review of the patient's diet heavily dependent upon dairy products such as yogurt and cheeses. Although he is making better food choices and he did preoperatively, it appears his total sugar load and carbohydrate load remains high. Most notably in the yogurt as well as snack choices([employer]).   She still describes significant nocturnal reflux although he reports not eating later going to bed shortly after meals. He continues on daily proton pump inhibitors. I have recommended he elevate the head of his bed however he states he finds it difficult given the lumbar arthropathies.\par \par Nutritional counseling has been provided. The patient is encouraged to remain calorie conscious and continue a low fat, low carbohydrate, high protein diet. Also, emphasis has been placed on the importance of adequate hydration, multi-vitamin supplementation and exercise.  (15 min)\par \par Prescription for routine blood work has been provided.  Once complete the patient will be notified of any abnormalities\par \par Follow up in 6 months

## 2019-02-14 NOTE — HISTORY OF PRESENT ILLNESS
[Procedure: ___] : Procedure performed: [unfilled]  [Date of Surgery: ___] : Date of Surgery:   [unfilled] [Surgeon Name:   ___] : Surgeon Name: Dr. BRAGA

## 2019-02-14 NOTE — PHYSICAL EXAM
[Obese, well nourished, in no acute distress] : obese, well nourished, in no acute distress [Normal] : affect appropriate [de-identified] : Lower extremity edema persists but improved. [de-identified] : Obese, soft, nontender, nondistended, positive bowel sounds in all four quadrants.  No hernia or masses. [de-identified] : Ambulating without difficulty or assistance

## 2019-02-14 NOTE — REASON FOR VISIT
[Follow-Up Visit] : a follow-up visit for [Morbid Obesity (BMI>40)] : morbid obesity (bmi>40) [S/P Bariatric Surgery] : s/p bariatric surgery

## 2019-03-07 ENCOUNTER — APPOINTMENT (OUTPATIENT)
Dept: INTERNAL MEDICINE | Facility: CLINIC | Age: 59
End: 2019-03-07
Payer: MEDICARE

## 2019-03-07 ENCOUNTER — LABORATORY RESULT (OUTPATIENT)
Age: 59
End: 2019-03-07

## 2019-03-07 ENCOUNTER — NON-APPOINTMENT (OUTPATIENT)
Age: 59
End: 2019-03-07

## 2019-03-07 VITALS — WEIGHT: 220 LBS | BODY MASS INDEX: 39.47 KG/M2 | HEIGHT: 62.5 IN

## 2019-03-07 LAB
25(OH)D3 SERPL-MCNC: 44.7 NG/ML
ALBUMIN SERPL ELPH-MCNC: 4.4 G/DL
ALP BLD-CCNC: 82 U/L
ALT SERPL-CCNC: 18 U/L
ANION GAP SERPL CALC-SCNC: 14 MMOL/L
APTT BLD: 27.2 SEC
AST SERPL-CCNC: 16 U/L
BASOPHILS # BLD AUTO: 0.05 K/UL
BASOPHILS NFR BLD AUTO: 0.6 %
BILIRUB SERPL-MCNC: 0.4 MG/DL
BILIRUB UR QL STRIP: NORMAL
BUN SERPL-MCNC: 23 MG/DL
CALCIUM SERPL-MCNC: 10.2 MG/DL
CHLORIDE SERPL-SCNC: 100 MMOL/L
CHOLEST SERPL-MCNC: 150 MG/DL
CHOLEST/HDLC SERPL: 3.1 RATIO
CLARITY UR: CLEAR
CO2 SERPL-SCNC: 28 MMOL/L
COLLECTION METHOD: NORMAL
CREAT SERPL-MCNC: 1.15 MG/DL
EOSINOPHIL # BLD AUTO: 0.31 K/UL
EOSINOPHIL NFR BLD AUTO: 3.5 %
GLUCOSE SERPL-MCNC: 104 MG/DL
GLUCOSE UR-MCNC: NORMAL
HBA1C MFR BLD HPLC: 5.8 %
HCG UR QL: 0.2 EU/DL
HCT VFR BLD CALC: 40.9 %
HDLC SERPL-MCNC: 48 MG/DL
HGB BLD-MCNC: 14.2 G/DL
HGB UR QL STRIP.AUTO: NORMAL
IMM GRANULOCYTES NFR BLD AUTO: 0.3 %
KETONES UR-MCNC: NORMAL
LDLC SERPL CALC-MCNC: 74 MG/DL
LEUKOCYTE ESTERASE UR QL STRIP: NORMAL
LYMPHOCYTES # BLD AUTO: 1.76 K/UL
LYMPHOCYTES NFR BLD AUTO: 20 %
MAN DIFF?: NORMAL
MCHC RBC-ENTMCNC: 29.6 PG
MCHC RBC-ENTMCNC: 34.7 GM/DL
MCV RBC AUTO: 85.2 FL
MONOCYTES # BLD AUTO: 0.95 K/UL
MONOCYTES NFR BLD AUTO: 10.8 %
NEUTROPHILS # BLD AUTO: 5.7 K/UL
NEUTROPHILS NFR BLD AUTO: 64.8 %
NITRITE UR QL STRIP: POSITIVE
PH UR STRIP: 5
PLATELET # BLD AUTO: 245 K/UL
POTASSIUM SERPL-SCNC: 4.2 MMOL/L
PROT SERPL-MCNC: 7 G/DL
PROT UR STRIP-MCNC: NORMAL
RBC # BLD: 4.8 M/UL
RBC # FLD: 13.6 %
SAVE SPECIMEN: NORMAL
SODIUM SERPL-SCNC: 142 MMOL/L
SP GR UR STRIP: 1.02
T3RU NFR SERPL: 1 TBI
TRIGL SERPL-MCNC: 138 MG/DL
TSH SERPL-ACNC: 0.86 UIU/ML
URATE SERPL-MCNC: 6.6 MG/DL
WBC # FLD AUTO: 8.8 K/UL

## 2019-03-07 PROCEDURE — 93000 ELECTROCARDIOGRAM COMPLETE: CPT

## 2019-03-07 PROCEDURE — 99214 OFFICE O/P EST MOD 30 MIN: CPT | Mod: 25

## 2019-03-07 PROCEDURE — 36415 COLL VENOUS BLD VENIPUNCTURE: CPT

## 2019-03-07 PROCEDURE — 81003 URINALYSIS AUTO W/O SCOPE: CPT | Mod: QW

## 2019-03-07 RX ORDER — DOXYCYCLINE HYCLATE 100 MG/1
100 CAPSULE ORAL DAILY
Qty: 7 | Refills: 0 | Status: DISCONTINUED | COMMUNITY
Start: 2018-12-10 | End: 2019-03-07

## 2019-03-07 NOTE — ASSESSMENT
[FreeTextEntry1] : This is a 58-year-old gentleman with the following problems\par Hypertension- his blood pressure is well controlled on low salt and his angiotensin receptor blocker\par Bariatric surgery- the patient unfortunately has continued to gain weight because he's been indiscriminate with his diet. I spoke to her about the importance of exercise low-carb diet. In addition on his bloods were assessing his vitamin levels and cholesterol and A1c.\par Bladder carcinoma- he was referred to urology for followup\par Elevated A1c- this has been well-controlled and started although we're repeating this level VII

## 2019-03-07 NOTE — HISTORY OF PRESENT ILLNESS
[de-identified] : This is a 58-year-old gentleman with multiple problems who is here today for followup visit. The patient had bariatric surgery brought her from 2 years ago and is doing well with weight loss. We do have to check his bloods to check his vitamin levels to make certain that is something vitamin depleted. In addition he has carcinoma which is being followed by urology. He does have a history of morbid obesity. And a history of hypertension which is being treated with an angiotensin receptor blocker

## 2019-03-07 NOTE — PHYSICAL EXAM
[PERRL] : pupils equal round and reactive to light [EOMI] : extraocular movements intact [Normal Oropharynx] : the oropharynx was normal [Normal TMs] : both tympanic membranes were normal [Supple] : supple [No Lymphadenopathy] : no lymphadenopathy [Clear to Auscultation] : lungs were clear to auscultation bilaterally [No Accessory Muscle Use] : no accessory muscle use [Regular Rhythm] : with a regular rhythm [Normal S1, S2] : normal S1 and S2 [No Murmur] : no murmur heard [No Carotid Bruits] : no carotid bruits [Pedal Pulses Present] : the pedal pulses are present [Soft] : abdomen soft [Non Tender] : non-tender

## 2019-03-09 DIAGNOSIS — N39.0 URINARY TRACT INFECTION, SITE NOT SPECIFIED: ICD-10-CM

## 2019-03-09 DIAGNOSIS — B96.20 URINARY TRACT INFECTION, SITE NOT SPECIFIED: ICD-10-CM

## 2019-03-10 LAB — BACTERIA UR CULT: ABNORMAL

## 2019-03-12 ENCOUNTER — MEDICATION RENEWAL (OUTPATIENT)
Age: 59
End: 2019-03-12

## 2019-03-20 LAB
APPEARANCE: CLEAR
BACTERIA: NEGATIVE
BILIRUBIN URINE: NEGATIVE
BLOOD URINE: NEGATIVE
COLOR: YELLOW
GLUCOSE QUALITATIVE U: ABNORMAL
HYALINE CASTS: 4 /LPF
KETONES URINE: NEGATIVE
LEUKOCYTE ESTERASE URINE: NEGATIVE
MICROSCOPIC-UA: NORMAL
NITRITE URINE: NEGATIVE
PH URINE: 5.5
PROTEIN URINE: ABNORMAL
RED BLOOD CELLS URINE: 3 /HPF
SPECIFIC GRAVITY URINE: 1.03
SQUAMOUS EPITHELIAL CELLS: 1 /HPF
UROBILINOGEN URINE: NORMAL
WHITE BLOOD CELLS URINE: 3 /HPF

## 2019-03-21 LAB — BACTERIA UR CULT: NORMAL

## 2019-05-03 ENCOUNTER — RX RENEWAL (OUTPATIENT)
Age: 59
End: 2019-05-03

## 2019-05-14 NOTE — DISCHARGE NOTE ADULT - DISCHARGE DATE
Spouse called back. She is with patient who is driving, so please call her number but he is there to talk or give permission to talk to her.    21-Sep-2018

## 2019-06-04 ENCOUNTER — LABORATORY RESULT (OUTPATIENT)
Age: 59
End: 2019-06-04

## 2019-06-04 ENCOUNTER — NON-APPOINTMENT (OUTPATIENT)
Age: 59
End: 2019-06-04

## 2019-06-04 ENCOUNTER — APPOINTMENT (OUTPATIENT)
Dept: INTERNAL MEDICINE | Facility: CLINIC | Age: 59
End: 2019-06-04
Payer: MEDICARE

## 2019-06-04 VITALS — BODY MASS INDEX: 39.65 KG/M2 | WEIGHT: 221 LBS | HEIGHT: 62.5 IN

## 2019-06-04 PROCEDURE — 81003 URINALYSIS AUTO W/O SCOPE: CPT | Mod: QW

## 2019-06-04 PROCEDURE — 36415 COLL VENOUS BLD VENIPUNCTURE: CPT

## 2019-06-04 PROCEDURE — 99214 OFFICE O/P EST MOD 30 MIN: CPT | Mod: 25

## 2019-06-04 PROCEDURE — 93000 ELECTROCARDIOGRAM COMPLETE: CPT

## 2019-06-04 RX ORDER — DOXYCYCLINE HYCLATE 100 MG/1
100 CAPSULE ORAL
Qty: 10 | Refills: 0 | Status: DISCONTINUED | COMMUNITY
Start: 2019-03-10 | End: 2019-06-04

## 2019-06-04 NOTE — PHYSICAL EXAM
[PERRL] : pupils equal round and reactive to light [EOMI] : extraocular movements intact [Normal Oropharynx] : the oropharynx was normal [Normal TMs] : both tympanic membranes were normal [Supple] : supple [No Lymphadenopathy] : no lymphadenopathy [Clear to Auscultation] : lungs were clear to auscultation bilaterally [No Accessory Muscle Use] : no accessory muscle use [Regular Rhythm] : with a regular rhythm [Normal S1, S2] : normal S1 and S2 [No Murmur] : no murmur heard [No Carotid Bruits] : no carotid bruits [Pedal Pulses Present] : the pedal pulses are present [Soft] : abdomen soft [Non Tender] : non-tender [de-identified] : Small nodule right septum medially

## 2019-06-04 NOTE — HISTORY OF PRESENT ILLNESS
[de-identified] : This is a patient who is 59 years old with a history of morbid obesity who recently had paramedics with surgery for his obesity. She also has a history of multivessel paracentesis which is being treated with Reglan 5 mg t.i.d. In addition he has a history of hypertension and type 2 diabetes mellitus. The patient is here for his followup visit .He is also complaining of an infected hair follicle on the left side of his neck and a nodule in his right nostril.

## 2019-06-04 NOTE — ASSESSMENT
[FreeTextEntry1] : Problems\par Bladder cancer\par Diabetes mellitus\par Gastroparesis\par Morbid obesity\par Laparoscopic surgery\par Infected hair follicle\par Nodule in his left nostril\par Assessment\par In spite of the patient's surgery his weight has remained stable he is not continue to lose weight because the patient is being amenable with his diet and he is not exercising. I explained to the patient at length the importance of his weight and diabetic control on his cardiovascular system. His blood pressure is fairly well-controlled. I started him on antibiotics for his folliculitis. In addition I referred him to ENT for his nodule by the medial nail medial aspect of his right septum

## 2019-06-05 LAB
25(OH)D3 SERPL-MCNC: 44.8 NG/ML
ALBUMIN SERPL ELPH-MCNC: 4.3 G/DL
ALP BLD-CCNC: 79 U/L
ALT SERPL-CCNC: 23 U/L
ANION GAP SERPL CALC-SCNC: 11 MMOL/L
AST SERPL-CCNC: 16 U/L
BASOPHILS # BLD AUTO: 0.08 K/UL
BASOPHILS NFR BLD AUTO: 0.9 %
BILIRUB SERPL-MCNC: 0.4 MG/DL
BUN SERPL-MCNC: 19 MG/DL
CALCIUM SERPL-MCNC: 9.8 MG/DL
CHLORIDE SERPL-SCNC: 103 MMOL/L
CHOLEST SERPL-MCNC: 139 MG/DL
CHOLEST/HDLC SERPL: 3.3 RATIO
CO2 SERPL-SCNC: 27 MMOL/L
CREAT SERPL-MCNC: 1.17 MG/DL
EOSINOPHIL # BLD AUTO: 0.35 K/UL
EOSINOPHIL NFR BLD AUTO: 4.1 %
ESTIMATED AVERAGE GLUCOSE: 120 MG/DL
FERRITIN SERPL-MCNC: 46 NG/ML
FOLATE SERPL-MCNC: >20 NG/ML
GLUCOSE SERPL-MCNC: 132 MG/DL
HBA1C MFR BLD HPLC: 5.8 %
HCT VFR BLD CALC: 38.7 %
HDLC SERPL-MCNC: 42 MG/DL
HGB BLD-MCNC: 13.2 G/DL
IMM GRANULOCYTES NFR BLD AUTO: 0.2 %
LDLC SERPL CALC-MCNC: 68 MG/DL
LYMPHOCYTES # BLD AUTO: 1.5 K/UL
LYMPHOCYTES NFR BLD AUTO: 17.5 %
MAN DIFF?: NORMAL
MCHC RBC-ENTMCNC: 29.3 PG
MCHC RBC-ENTMCNC: 34.1 GM/DL
MCV RBC AUTO: 86 FL
MONOCYTES # BLD AUTO: 0.95 K/UL
MONOCYTES NFR BLD AUTO: 11.1 %
NEUTROPHILS # BLD AUTO: 5.65 K/UL
NEUTROPHILS NFR BLD AUTO: 66.2 %
PLATELET # BLD AUTO: 217 K/UL
POTASSIUM SERPL-SCNC: 4 MMOL/L
PROT SERPL-MCNC: 7.1 G/DL
RBC # BLD: 4.5 M/UL
RBC # FLD: 13.6 %
SAVE SPECIMEN: NORMAL
SODIUM SERPL-SCNC: 141 MMOL/L
T3RU NFR SERPL: 1 TBI
T4 SERPL-MCNC: 7.8 UG/DL
TRIGL SERPL-MCNC: 147 MG/DL
TSH SERPL-ACNC: 0.75 UIU/ML
URATE SERPL-MCNC: 6.4 MG/DL
VIT B12 SERPL-MCNC: >2000 PG/ML
WBC # FLD AUTO: 8.55 K/UL
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
## 2019-06-24 ENCOUNTER — APPOINTMENT (OUTPATIENT)
Dept: OTOLARYNGOLOGY | Facility: CLINIC | Age: 59
End: 2019-06-24
Payer: MEDICARE

## 2019-06-24 VITALS
HEIGHT: 62.5 IN | WEIGHT: 221 LBS | SYSTOLIC BLOOD PRESSURE: 192 MMHG | DIASTOLIC BLOOD PRESSURE: 84 MMHG | HEART RATE: 52 BPM | BODY MASS INDEX: 39.65 KG/M2

## 2019-06-24 DIAGNOSIS — R09.81 NASAL CONGESTION: ICD-10-CM

## 2019-06-24 DIAGNOSIS — J34.2 DEVIATED NASAL SEPTUM: ICD-10-CM

## 2019-06-24 PROCEDURE — 31231 NASAL ENDOSCOPY DX: CPT

## 2019-06-24 PROCEDURE — 99204 OFFICE O/P NEW MOD 45 MIN: CPT | Mod: 25

## 2019-06-24 NOTE — CONSULT LETTER
[Consult Letter:] : I had the pleasure of evaluating your patient, [unfilled]. [Dear  ___] : Dear  [unfilled], [Sincerely,] : Sincerely, [Consult Closing:] : Thank you very much for allowing me to participate in the care of this patient.  If you have any questions, please do not hesitate to contact me. [Please see my note below.] : Please see my note below. [FreeTextEntry3] : Tito Garcia MD\par [facility]\par Otolaryngology and Facial Plastics\par Associated Professor, [employer]\par

## 2019-06-24 NOTE — ASSESSMENT
[FreeTextEntry1] : Patient referred here for evaluation for possible cyst in his right nasal cavity. The patient used to be over 300 pounds headache GI sleeve I. which is working well. He does get some regard to time with velopharyngeal insufficiency on occasion. He noticed related to his sleeve. Examination endoscopically nasally distal tumors masses cysts or polyps of what I believe was thought to be a cyst was indeed normal right inferior lower lateral cartilage medial crura jetting out. This was pointed out to him and he was reassured. He will followup with us as needed no further acute ENT intervention is indicated.

## 2019-06-24 NOTE — PHYSICAL EXAM
[Nasal Endoscopy Performed] : nasal endoscopy was performed, see procedure section for findings [] : septum deviated to the right [Midline] : trachea located in midline position [Normal] : no rashes [de-identified] : cartilage of the right caudal septum is prominent - no cyst

## 2019-06-24 NOTE — HISTORY OF PRESENT ILLNESS
[de-identified] : PAtient was told but he PCP that he may have a cyst in the right nostril. HE has not had any pain or bleeding from the nose. He kahn snot have any obstruction or nasal congestion. He does admit that sometimes he has food come out his nose when he eats on and  off for the last 6-7 months. He has told his bariatric surgeon that this happens and they are aware.  he does not have any recent history of sinus infections. He does not think that he has any seasonal allergies but he sneezes on occasion.

## 2019-08-14 ENCOUNTER — APPOINTMENT (OUTPATIENT)
Dept: BARIATRICS | Facility: CLINIC | Age: 59
End: 2019-08-14
Payer: MEDICARE

## 2019-08-14 VITALS
HEIGHT: 62.5 IN | DIASTOLIC BLOOD PRESSURE: 80 MMHG | SYSTOLIC BLOOD PRESSURE: 132 MMHG | HEART RATE: 57 BPM | BODY MASS INDEX: 39.24 KG/M2 | OXYGEN SATURATION: 97 % | WEIGHT: 218.69 LBS

## 2019-08-14 PROCEDURE — 99214 OFFICE O/P EST MOD 30 MIN: CPT

## 2019-08-14 NOTE — ASSESSMENT
[FreeTextEntry1] : 59 year old M  s/p lap sleeve gastrectomy and intra- op EGD and hiatal hernia repair Weight loss since last visit. Mild reflux symptoms. \par \par Will continue multivitamins and continue protein and liquid intake as instructed.\par Discussed foods to avoid that may exacerbate GERD symptoms. \par Script given to have routine vitamin levels checked prior to next visit. \par Call for any questions or concerns.\par \par Nutritional counseling has been provided. The patient is encouraged to remain calorie conscious and continue a low fat, low carbohydrate, protein focus diet. Pt encouraged to participate in a daily exercise regimen incorporating cardio and  strength training. Plan to schedule one on one with nutritionist. \par \par Dr. Garcia saw patient. \par \par Return to office in February 2020 or earlier if any concerns.\par

## 2019-08-14 NOTE — HISTORY OF PRESENT ILLNESS
[Procedure: ___] : Procedure performed: [unfilled]  [Date of Surgery: ___] : Date of Surgery:   [unfilled] [Surgeon Name:   ___] : Surgeon Name: Dr. BRAGA [de-identified] : 59 year old M  s/p lap sleeve gastrectomy and intra- op EGD and hiatal hernia repair Weight loss since last visit.Denies any food intolerances. Pt is consuming consistent protein focus meals and consuming a sufficient amount of zero calorie liquid per day.Consuming 3 meals per day. Patient is taking vitamin supplements as directed. No change in BM. Mild reflux symptoms. Pt states his dosage of omeprazole was recently increased from 20 mg to 40 mg due to ongoing symptoms as per PCP. Exercising regularly incorporating cardio and strength training. Plan to have routine blood work performed with PCP within next few weeks. Plan to schedule a one on one visit with nutritionist prior to next visit.

## 2019-08-14 NOTE — PHYSICAL EXAM
[Obese, well nourished, in no acute distress] : obese, well nourished, in no acute distress [Normal] : affect appropriate [de-identified] : normoactive bowel sounds, soft and non tender, no hepatosplenomegaly or masses appreciated.

## 2019-08-14 NOTE — REVIEW OF SYSTEMS
[Recent Change In Weight] : ~T recent weight change [Reflux/Heartburn] : reflux/heartburn [Negative] : Psychiatric [Fever] : no fever [Dysphagia] : no dysphagia [Chest Pain] : no chest pain [Palpitations] : no palpitations [Shortness Of Breath] : no shortness of breath [Wheezing] : no wheezing [Abdominal Pain] : no abdominal pain [SOB on Exertion] : no shortness of breath during exertion [Vomiting] : no vomiting [Constipation] : no constipation [FreeTextEntry2] : weight loss since last visit.  [Diarrhea] : no diarrhea [FreeTextEntry7] : mild reflux symptoms at times.

## 2019-09-05 ENCOUNTER — LABORATORY RESULT (OUTPATIENT)
Age: 59
End: 2019-09-05

## 2019-09-05 ENCOUNTER — APPOINTMENT (OUTPATIENT)
Dept: INTERNAL MEDICINE | Facility: CLINIC | Age: 59
End: 2019-09-05
Payer: MEDICARE

## 2019-09-05 ENCOUNTER — NON-APPOINTMENT (OUTPATIENT)
Age: 59
End: 2019-09-05

## 2019-09-05 VITALS — WEIGHT: 219 LBS | HEIGHT: 62 IN | BODY MASS INDEX: 40.3 KG/M2

## 2019-09-05 VITALS — DIASTOLIC BLOOD PRESSURE: 70 MMHG | SYSTOLIC BLOOD PRESSURE: 130 MMHG

## 2019-09-05 DIAGNOSIS — Z87.01 PERSONAL HISTORY OF PNEUMONIA (RECURRENT): ICD-10-CM

## 2019-09-05 DIAGNOSIS — Z87.39 PERSONAL HISTORY OF OTHER DISEASES OF THE MUSCULOSKELETAL SYSTEM AND CONNECTIVE TISSUE: ICD-10-CM

## 2019-09-05 LAB
ALBUMIN SERPL ELPH-MCNC: 4.5 G/DL
ALP BLD-CCNC: 92 U/L
ALT SERPL-CCNC: 22 U/L
ANION GAP SERPL CALC-SCNC: 15 MMOL/L
AST SERPL-CCNC: 16 U/L
BASOPHILS # BLD AUTO: 0.06 K/UL
BASOPHILS NFR BLD AUTO: 0.7 %
BILIRUB SERPL-MCNC: 0.4 MG/DL
BILIRUB UR QL STRIP: NEGATIVE
BUN SERPL-MCNC: 16 MG/DL
CALCIUM SERPL-MCNC: 10 MG/DL
CHLORIDE SERPL-SCNC: 95 MMOL/L
CHOLEST SERPL-MCNC: 131 MG/DL
CHOLEST/HDLC SERPL: 3.1 RATIO
CLARITY UR: CLEAR
CO2 SERPL-SCNC: 29 MMOL/L
COLLECTION METHOD: NORMAL
CREAT SERPL-MCNC: 0.91 MG/DL
EOSINOPHIL # BLD AUTO: 0.33 K/UL
EOSINOPHIL NFR BLD AUTO: 3.8 %
ESTIMATED AVERAGE GLUCOSE: 114 MG/DL
FERRITIN SERPL-MCNC: 33 NG/ML
GLUCOSE SERPL-MCNC: 109 MG/DL
GLUCOSE UR-MCNC: NEGATIVE
HBA1C MFR BLD HPLC: 5.6 %
HCG UR QL: 0.2 EU/DL
HCT VFR BLD CALC: 39.8 %
HDLC SERPL-MCNC: 43 MG/DL
HGB BLD-MCNC: 13.3 G/DL
HGB UR QL STRIP.AUTO: NEGATIVE
IMM GRANULOCYTES NFR BLD AUTO: 0.3 %
IRON SATN MFR SERPL: 20 %
IRON SERPL-MCNC: 69 UG/DL
KETONES UR-MCNC: NEGATIVE
LDLC SERPL CALC-MCNC: 64 MG/DL
LEUKOCYTE ESTERASE UR QL STRIP: NEGATIVE
LYMPHOCYTES # BLD AUTO: 1.21 K/UL
LYMPHOCYTES NFR BLD AUTO: 14 %
MAN DIFF?: NORMAL
MCHC RBC-ENTMCNC: 29.6 PG
MCHC RBC-ENTMCNC: 33.4 GM/DL
MCV RBC AUTO: 88.6 FL
MONOCYTES # BLD AUTO: 0.82 K/UL
MONOCYTES NFR BLD AUTO: 9.5 %
NEUTROPHILS # BLD AUTO: 6.19 K/UL
NEUTROPHILS NFR BLD AUTO: 71.7 %
NITRITE UR QL STRIP: NEGATIVE
PH UR STRIP: 5
PLATELET # BLD AUTO: 242 K/UL
POTASSIUM SERPL-SCNC: 4 MMOL/L
PROT SERPL-MCNC: 7 G/DL
PROT UR STRIP-MCNC: NORMAL
RBC # BLD: 4.49 M/UL
RBC # FLD: 13.9 %
SAVE SPECIMEN: NORMAL
SODIUM SERPL-SCNC: 139 MMOL/L
SP GR UR STRIP: 1.02
T3RU NFR SERPL: 1 TBI
T4 SERPL-MCNC: 8.1 UG/DL
TIBC SERPL-MCNC: 352 UG/DL
TRIGL SERPL-MCNC: 120 MG/DL
TSH SERPL-ACNC: 1.09 UIU/ML
UIBC SERPL-MCNC: 283 UG/DL
URATE SERPL-MCNC: 5.5 MG/DL
VIT B12 SERPL-MCNC: >2000 PG/ML
WBC # FLD AUTO: 8.64 K/UL

## 2019-09-05 PROCEDURE — 36415 COLL VENOUS BLD VENIPUNCTURE: CPT

## 2019-09-05 PROCEDURE — 81003 URINALYSIS AUTO W/O SCOPE: CPT | Mod: QW

## 2019-09-05 PROCEDURE — 93000 ELECTROCARDIOGRAM COMPLETE: CPT

## 2019-09-05 PROCEDURE — 99214 OFFICE O/P EST MOD 30 MIN: CPT | Mod: 25

## 2019-09-05 NOTE — ASSESSMENT
[FreeTextEntry1] : Problems\par Bladder carcinoma\par Diabetes mellitus\par Hypertension\par \par \par Surgery\par Sleep apnea\par Furuncle\par Assessment\par This is a 59-year-old gentleman who looks older than his stated age who underwent bariatric surgery and is here for followup specifically of his vitamins and B12 and iron who's complaining of a boil in his left upper quadrant. The patient also has a history of bladder carcinoma for which she recently underwent cystoscopy and was stable. In addition he has a history of diabetes mellitus. 10 sleep apnea. His vital signs were stable. His physical examination is positive for obesity and alcohol and is left upper quadrant. I informed the patient not to squeeze this as is the possibility of seeding his blood. In addition I started him on doxycycline 100 mg b.i.d. and referred him to surgery for I&D.\par \par \par \par \par \par

## 2019-09-05 NOTE — PHYSICAL EXAM
[Soft] : abdomen soft [No HSM] : no HSM [Normal] : no posterior cervical lymphadenopathy and no anterior cervical lymphadenopathy [de-identified] : Furuncle left upper quadrant

## 2019-09-05 NOTE — HISTORY OF PRESENT ILLNESS
[de-identified] : This is a 59-year-old gentleman with a history of diabetes mellitus, bladder carcinoma, hypertension, bariatric surgery for morbid obesity, sleep apnea who is here today for a followup visit of but in addition is complaining of a boil in his left upper abdomen.

## 2019-09-06 LAB — 25(OH)D3 SERPL-MCNC: 49.9 NG/ML

## 2019-09-08 LAB — ZINC SERPL-MCNC: 100 UG/DL

## 2019-09-09 LAB
VIT A SERPL-MCNC: 77.6 UG/DL
VIT B6 SERPL-MCNC: 12.9 UG/L

## 2019-10-23 NOTE — ED ADULT TRIAGE NOTE - AS HEIGHT TYPE
stated Nsaids Counseling: NSAID Counseling: I discussed with the patient that NSAIDs should be taken with food. Prolonged use of NSAIDs can result in the development of stomach ulcers.  Patient advised to stop taking NSAIDs if abdominal pain occurs.  The patient verbalized understanding of the proper use and possible adverse effects of NSAIDs.  All of the patient's questions and concerns were addressed.

## 2019-10-29 ENCOUNTER — RX CHANGE (OUTPATIENT)
Age: 59
End: 2019-10-29

## 2019-10-29 RX ORDER — LOSARTAN POTASSIUM AND HYDROCHLOROTHIAZIDE 12.5; 5 MG/1; MG/1
50-12.5 TABLET ORAL
Qty: 90 | Refills: 3 | Status: DISCONTINUED | COMMUNITY
Start: 2018-03-22 | End: 2019-10-29

## 2019-12-09 ENCOUNTER — APPOINTMENT (OUTPATIENT)
Dept: INTERNAL MEDICINE | Facility: CLINIC | Age: 59
End: 2019-12-09
Payer: MEDICARE

## 2019-12-09 ENCOUNTER — LABORATORY RESULT (OUTPATIENT)
Age: 59
End: 2019-12-09

## 2019-12-09 ENCOUNTER — NON-APPOINTMENT (OUTPATIENT)
Age: 59
End: 2019-12-09

## 2019-12-09 VITALS — HEIGHT: 62 IN | BODY MASS INDEX: 39.56 KG/M2 | WEIGHT: 215 LBS

## 2019-12-09 VITALS — DIASTOLIC BLOOD PRESSURE: 80 MMHG | SYSTOLIC BLOOD PRESSURE: 120 MMHG

## 2019-12-09 DIAGNOSIS — A49.9 URINARY TRACT INFECTION, SITE NOT SPECIFIED: ICD-10-CM

## 2019-12-09 DIAGNOSIS — N39.0 URINARY TRACT INFECTION, SITE NOT SPECIFIED: ICD-10-CM

## 2019-12-09 LAB
25(OH)D3 SERPL-MCNC: 53.6 NG/ML
ALBUMIN SERPL ELPH-MCNC: 4.3 G/DL
ALP BLD-CCNC: 84 U/L
ALT SERPL-CCNC: 16 U/L
ANION GAP SERPL CALC-SCNC: 14 MMOL/L
AST SERPL-CCNC: 16 U/L
BASOPHILS # BLD AUTO: 0.06 K/UL
BASOPHILS NFR BLD AUTO: 0.7 %
BILIRUB SERPL-MCNC: 0.5 MG/DL
BUN SERPL-MCNC: 19 MG/DL
CALCIUM SERPL-MCNC: 10.2 MG/DL
CHLORIDE SERPL-SCNC: 98 MMOL/L
CHOLEST SERPL-MCNC: 154 MG/DL
CHOLEST/HDLC SERPL: 3.1 RATIO
CO2 SERPL-SCNC: 28 MMOL/L
CREAT SERPL-MCNC: 0.92 MG/DL
EOSINOPHIL # BLD AUTO: 0.32 K/UL
EOSINOPHIL NFR BLD AUTO: 3.5 %
ESTIMATED AVERAGE GLUCOSE: 117 MG/DL
FERRITIN SERPL-MCNC: 28 NG/ML
FOLATE SERPL-MCNC: 20 NG/ML
HBA1C MFR BLD HPLC: 5.7 %
HCT VFR BLD CALC: 38.5 %
HDLC SERPL-MCNC: 49 MG/DL
HGB BLD-MCNC: 13.2 G/DL
IMM GRANULOCYTES NFR BLD AUTO: 0.2 %
LDLC SERPL CALC-MCNC: 77 MG/DL
LYMPHOCYTES # BLD AUTO: 1.34 K/UL
LYMPHOCYTES NFR BLD AUTO: 14.8 %
MAN DIFF?: NORMAL
MCHC RBC-ENTMCNC: 29.1 PG
MCHC RBC-ENTMCNC: 34.3 GM/DL
MCV RBC AUTO: 84.8 FL
MONOCYTES # BLD AUTO: 0.97 K/UL
MONOCYTES NFR BLD AUTO: 10.7 %
NEUTROPHILS # BLD AUTO: 6.34 K/UL
NEUTROPHILS NFR BLD AUTO: 70.1 %
PLATELET # BLD AUTO: 194 K/UL
POTASSIUM SERPL-SCNC: 3.6 MMOL/L
PROT SERPL-MCNC: 6.8 G/DL
RBC # BLD: 4.54 M/UL
RBC # FLD: 14 %
SAVE SPECIMEN: NORMAL
SODIUM SERPL-SCNC: 140 MMOL/L
T3RU NFR SERPL: 1.1 TBI
T4 SERPL-MCNC: 7.2 UG/DL
TRIGL SERPL-MCNC: 141 MG/DL
TSH SERPL-ACNC: 1.1 UIU/ML
URATE SERPL-MCNC: 5.5 MG/DL
VIT B12 SERPL-MCNC: 1649 PG/ML
WBC # FLD AUTO: 9.05 K/UL

## 2019-12-09 PROCEDURE — 99214 OFFICE O/P EST MOD 30 MIN: CPT | Mod: 25

## 2019-12-09 PROCEDURE — 36415 COLL VENOUS BLD VENIPUNCTURE: CPT

## 2019-12-09 PROCEDURE — 93000 ELECTROCARDIOGRAM COMPLETE: CPT

## 2019-12-09 PROCEDURE — 90686 IIV4 VACC NO PRSV 0.5 ML IM: CPT

## 2019-12-09 PROCEDURE — G0008: CPT

## 2019-12-09 RX ORDER — DOXYCYCLINE HYCLATE 100 MG/1
100 CAPSULE ORAL TWICE DAILY
Qty: 14 | Refills: 1 | Status: DISCONTINUED | COMMUNITY
Start: 2019-06-04 | End: 2019-12-09

## 2019-12-09 RX ORDER — METOCLOPRAMIDE HYDROCHLORIDE 5 MG/1
5 TABLET, ORALLY DISINTEGRATING ORAL
Qty: 60 | Refills: 2 | Status: DISCONTINUED | COMMUNITY
Start: 2018-09-27 | End: 2019-12-09

## 2019-12-09 NOTE — HISTORY OF PRESENT ILLNESS
[de-identified] : This is a 59-year-old gentleman with a history of bladder cancer, diabetes, gallstones, hypertension, morbid obesity who is here today for a followup visit

## 2019-12-09 NOTE — ASSESSMENT
[FreeTextEntry1] : Problems\par Bladder carcinoma\par Diabetes mellitus\par Gallstones\par Hypertension\par Status post para surgery\par Morbid obesity\par Assessment\par This is a massively obese patient who underwent a sleep surgery for obesity who has a history of bladder carcinoma, diabetes, gallstones, hypertension. I explained to the patient and he is aware of the impact that his low obesity has on his cardiovascular system his sugar and his cholesterol. He has been trying to lose weight and has joined an exercise program.

## 2019-12-09 NOTE — PHYSICAL EXAM
[Soft] : abdomen soft [No HSM] : no HSM [Normal] : normal gait, coordination grossly intact, no focal deficits and deep tendon reflexes were 2+ and symmetric [de-identified] : Furuncle left upper quadrant

## 2019-12-10 LAB
APPEARANCE: ABNORMAL
BACTERIA: ABNORMAL
BILIRUBIN URINE: NEGATIVE
BLOOD URINE: NORMAL
COLOR: YELLOW
GLUCOSE QUALITATIVE U: NEGATIVE
GLUCOSE SERPL-MCNC: 111 MG/DL
HYALINE CASTS: 0 /LPF
KETONES URINE: NEGATIVE
LEUKOCYTE ESTERASE URINE: ABNORMAL
MICROSCOPIC-UA: NORMAL
NITRITE URINE: POSITIVE
PH URINE: 5.5
PROTEIN URINE: ABNORMAL
RED BLOOD CELLS URINE: 36 /HPF
SPECIFIC GRAVITY URINE: 1.03
SQUAMOUS EPITHELIAL CELLS: 1 /HPF
UROBILINOGEN URINE: NORMAL
WHITE BLOOD CELLS URINE: 104 /HPF

## 2019-12-19 ENCOUNTER — RX RENEWAL (OUTPATIENT)
Age: 59
End: 2019-12-19

## 2019-12-19 DIAGNOSIS — N39.0 URINARY TRACT INFECTION, SITE NOT SPECIFIED: ICD-10-CM

## 2019-12-19 DIAGNOSIS — A49.9 URINARY TRACT INFECTION, SITE NOT SPECIFIED: ICD-10-CM

## 2019-12-19 LAB — BACTERIA UR CULT: ABNORMAL

## 2020-01-08 ENCOUNTER — EMERGENCY (EMERGENCY)
Facility: HOSPITAL | Age: 60
LOS: 1 days | Discharge: HOME | End: 2020-01-08
Attending: EMERGENCY MEDICINE | Admitting: EMERGENCY MEDICINE
Payer: MEDICARE

## 2020-01-08 VITALS
HEART RATE: 80 BPM | TEMPERATURE: 100 F | SYSTOLIC BLOOD PRESSURE: 197 MMHG | OXYGEN SATURATION: 98 % | HEIGHT: 65 IN | RESPIRATION RATE: 18 BRPM | DIASTOLIC BLOOD PRESSURE: 87 MMHG | WEIGHT: 214.07 LBS

## 2020-01-08 DIAGNOSIS — Z98.890 OTHER SPECIFIED POSTPROCEDURAL STATES: Chronic | ICD-10-CM

## 2020-01-08 DIAGNOSIS — Z98.84 BARIATRIC SURGERY STATUS: Chronic | ICD-10-CM

## 2020-01-08 PROCEDURE — 99283 EMERGENCY DEPT VISIT LOW MDM: CPT

## 2020-01-08 PROCEDURE — 93010 ELECTROCARDIOGRAM REPORT: CPT

## 2020-01-08 PROCEDURE — 71045 X-RAY EXAM CHEST 1 VIEW: CPT | Mod: 26

## 2020-01-08 RX ORDER — ACETAMINOPHEN 500 MG
650 TABLET ORAL ONCE
Refills: 0 | Status: COMPLETED | OUTPATIENT
Start: 2020-01-08 | End: 2020-01-08

## 2020-01-08 RX ORDER — SODIUM CHLORIDE 9 MG/ML
3000 INJECTION INTRAMUSCULAR; INTRAVENOUS; SUBCUTANEOUS ONCE
Refills: 0 | Status: COMPLETED | OUTPATIENT
Start: 2020-01-08 | End: 2020-01-08

## 2020-01-08 RX ADMIN — SODIUM CHLORIDE 3000 MILLILITER(S): 9 INJECTION INTRAMUSCULAR; INTRAVENOUS; SUBCUTANEOUS at 23:59

## 2020-01-08 RX ADMIN — Medication 650 MILLIGRAM(S): at 23:56

## 2020-01-08 NOTE — ED PROVIDER NOTE - OBJECTIVE STATEMENT
Patient states he has had a fever since last night. Vomited 3 times, last time about 18 hours ago. Mild runny nose. No sore throat, no cough. No SOB. No diarrhea. No abdo pain. No urinary symptoms. No rash. No travel/sick contacts. Patient states he is "prone to aspiration pneumonia."

## 2020-01-08 NOTE — ED ADULT TRIAGE NOTE - CHIEF COMPLAINT QUOTE
"I started last night around 11p; I had, started throwing up and getting dizzy", tonight "I got worse"

## 2020-01-08 NOTE — ED PROVIDER NOTE - CLINICAL SUMMARY MEDICAL DECISION MAKING FREE TEXT BOX
Patient with fever for one day. Some vomiting, not for 18 hours. Will check labs, treat fever, and give IV fluids

## 2020-01-08 NOTE — ED PROVIDER NOTE - NSFOLLOWUPINSTRUCTIONS_ED_ALL_ED_FT
Influenza    WHAT YOU NEED TO KNOW:    What is influenza? Influenza (the flu) is an infection caused by the influenza virus. The flu is easily spread when an infected person coughs, sneezes, or has close contact with others. You may be able to spread the flu to others for 1 week or longer after signs or symptoms appear.    What increases my risk for the flu?     Living with or caring for someone who has the flu      Living in a nursing home or long-term care facility      Living in close quarters with others      A medical condition such as diabetes, cancer, heart disease, or lung disease      Pregnancy      Age older than 50 years      A weak immune system caused by HIV, AIDS, an organ transplant, or another condition      Traveling to places where other people have the flu    What are the signs and symptoms of the flu?     Fever and chills      Headaches, body aches, and muscle or joint pain      Cough, runny nose, and sore throat      Loss of appetite, nausea, vomiting, or diarrhea      Tiredness      Trouble breathing    How is the flu diagnosed? Your healthcare provider will examine you and ask if you have other health conditions. Tell him or her if you have been around sick people or traveled recently. Tell your healthcare provider if you are pregnant. A sample of fluid may be collected from your nose or throat to be tested for the flu virus.    How is the flu treated? Most people get better within a week. You may need any of the following:     Acetaminophen decreases pain and fever. It is available without a doctor's order. Ask how much to take and how often to take it. Follow directions. Read the labels of all other medicines you are using to see if they also contain acetaminophen, or ask your doctor or pharmacist. Acetaminophen can cause liver damage if not taken correctly. Do not use more than 4 grams (4,000 milligrams) total of acetaminophen in one day.       NSAIDs, such as ibuprofen, help decrease swelling, pain, and fever. This medicine is available with or without a doctor's order. NSAIDs can cause stomach bleeding or kidney problems in certain people. If you take blood thinner medicine, always ask your healthcare provider if NSAIDs are safe for you. Always read the medicine label and follow directions.      Antivirals help fight a viral infection.    How can I manage my symptoms?     Rest as much as you can to help you recover.      Drink liquids as directed to help prevent dehydration. Ask how much liquid to drink each day and which liquids are best for you.    How can I help prevent the spread of the flu?     Wash your hands often. Use soap and water. Wash your hands after you use the bathroom, change a child's diapers, or sneeze. Wash your hands before you prepare or eat food. Use gel hand cleanser that has 60% alcohol, when soap and water are not available. Do not touch your eyes, nose, or mouth unless you have washed your hands first.Handwashing           Cover your mouth when you sneeze or cough. Cough into a tissue or the bend of your arm. If you use a tissue, throw it away immediately and wash your hands.       Clean shared items with a germ-killing . Clean table surfaces, doorknobs, and light switches. Do not share towels, silverware, and dishes with people who are sick. Wash bed sheets, towels, silverware, and dishes with soap and water.       Wear a mask over your mouth and nose if you are sick. The face mask may help protect others from becoming infected with the flu. Wear the mask when in common areas of your home or if you seek care with a healthcare provider.       Stay away from others if you are sick. Stay at home until 24 hours after your fever and symptoms are gone.      Influenza vaccine helps prevent influenza (flu). Everyone 6 months or older should get a yearly influenza vaccine. Get the vaccine as soon as recommended each year, usually in September or October.    Call your local emergency number (911 in the [place]) if:     You have trouble breathing, and your lips look purple or blue.      You have a seizure.      You have new pain or pressure in your chest.    When should I seek immediate care?     You are dizzy, or you are urinating less or not at all.       You have a headache with a stiff neck, and you feel tired or confused.      You have new pain or pressure in your chest.      Your symptoms, such as shortness of breath, vomiting, or diarrhea, get worse.       Your symptoms, such as fever and coughing, seem to get better, but then get worse.     When should I call my doctor?     You have new muscle pain or weakness.      You have questions or concerns about your condition or care.    CARE AGREEMENT:    You have the right to help plan your care. Learn about your health condition and how it may be treated. Discuss treatment options with your healthcare providers to decide what care you want to receive. You always have the right to refuse treatment.

## 2020-01-08 NOTE — ED PROVIDER NOTE - PATIENT PORTAL LINK FT
You can access the FollowMyHealth Patient Portal offered by [facility] by registering at the following website: [email or web address]. By joining [employer]’s FollowMyHealth portal, you will also be able to view your health information using other applications (apps) compatible with our system.

## 2020-01-09 VITALS — RESPIRATION RATE: 16 BRPM | HEART RATE: 78 BPM | DIASTOLIC BLOOD PRESSURE: 78 MMHG | SYSTOLIC BLOOD PRESSURE: 164 MMHG

## 2020-01-09 LAB
ALBUMIN SERPL ELPH-MCNC: 3.8 G/DL — SIGNIFICANT CHANGE UP (ref 3.3–5)
ALP SERPL-CCNC: 74 U/L — SIGNIFICANT CHANGE UP (ref 30–120)
ALT FLD-CCNC: 31 U/L DA — SIGNIFICANT CHANGE UP (ref 10–60)
ANION GAP SERPL CALC-SCNC: 4 MMOL/L — LOW (ref 5–17)
APPEARANCE UR: CLEAR — SIGNIFICANT CHANGE UP
APTT BLD: 27.5 SEC — LOW (ref 28.5–37)
AST SERPL-CCNC: 18 U/L — SIGNIFICANT CHANGE UP (ref 10–40)
BACTERIA # UR AUTO: ABNORMAL
BASOPHILS # BLD AUTO: 0.03 K/UL — SIGNIFICANT CHANGE UP (ref 0–0.2)
BASOPHILS NFR BLD AUTO: 0.4 % — SIGNIFICANT CHANGE UP (ref 0–2)
BILIRUB SERPL-MCNC: 0.7 MG/DL — SIGNIFICANT CHANGE UP (ref 0.2–1.2)
BILIRUB UR-MCNC: NEGATIVE — SIGNIFICANT CHANGE UP
BUN SERPL-MCNC: 19 MG/DL — SIGNIFICANT CHANGE UP (ref 7–23)
CALCIUM SERPL-MCNC: 9.3 MG/DL — SIGNIFICANT CHANGE UP (ref 8.4–10.5)
CHLORIDE SERPL-SCNC: 98 MMOL/L — SIGNIFICANT CHANGE UP (ref 96–108)
CO2 SERPL-SCNC: 34 MMOL/L — HIGH (ref 22–31)
COLOR SPEC: YELLOW — SIGNIFICANT CHANGE UP
CREAT SERPL-MCNC: 1.17 MG/DL — SIGNIFICANT CHANGE UP (ref 0.5–1.3)
DIFF PNL FLD: ABNORMAL
EOSINOPHIL # BLD AUTO: 0.05 K/UL — SIGNIFICANT CHANGE UP (ref 0–0.5)
EOSINOPHIL NFR BLD AUTO: 0.7 % — SIGNIFICANT CHANGE UP (ref 0–6)
EPI CELLS # UR: SIGNIFICANT CHANGE UP
FLU A RESULT: DETECTED
FLU A RESULT: DETECTED
FLUAV AG NPH QL: DETECTED
FLUBV AG NPH QL: SIGNIFICANT CHANGE UP
GLUCOSE SERPL-MCNC: 119 MG/DL — HIGH (ref 70–99)
GLUCOSE UR QL: NEGATIVE MG/DL — SIGNIFICANT CHANGE UP
HCT VFR BLD CALC: 38.5 % — LOW (ref 39–50)
HGB BLD-MCNC: 13.2 G/DL — SIGNIFICANT CHANGE UP (ref 13–17)
IMM GRANULOCYTES NFR BLD AUTO: 0.1 % — SIGNIFICANT CHANGE UP (ref 0–1.5)
INR BLD: 1.23 RATIO — HIGH (ref 0.88–1.16)
KETONES UR-MCNC: ABNORMAL
LACTATE SERPL-SCNC: 1.6 MMOL/L — SIGNIFICANT CHANGE UP (ref 0.7–2)
LEUKOCYTE ESTERASE UR-ACNC: NEGATIVE — SIGNIFICANT CHANGE UP
LYMPHOCYTES # BLD AUTO: 0.34 K/UL — LOW (ref 1–3.3)
LYMPHOCYTES # BLD AUTO: 4.7 % — LOW (ref 13–44)
MCHC RBC-ENTMCNC: 29.1 PG — SIGNIFICANT CHANGE UP (ref 27–34)
MCHC RBC-ENTMCNC: 34.3 GM/DL — SIGNIFICANT CHANGE UP (ref 32–36)
MCV RBC AUTO: 85 FL — SIGNIFICANT CHANGE UP (ref 80–100)
MONOCYTES # BLD AUTO: 1.2 K/UL — HIGH (ref 0–0.9)
MONOCYTES NFR BLD AUTO: 16.4 % — HIGH (ref 2–14)
NEUTROPHILS # BLD AUTO: 5.67 K/UL — SIGNIFICANT CHANGE UP (ref 1.8–7.4)
NEUTROPHILS NFR BLD AUTO: 77.7 % — HIGH (ref 43–77)
NITRITE UR-MCNC: NEGATIVE — SIGNIFICANT CHANGE UP
NRBC # BLD: 0 /100 WBCS — SIGNIFICANT CHANGE UP (ref 0–0)
PH UR: 5 — SIGNIFICANT CHANGE UP (ref 5–8)
PLATELET # BLD AUTO: 177 K/UL — SIGNIFICANT CHANGE UP (ref 150–400)
POTASSIUM SERPL-MCNC: 3 MMOL/L — LOW (ref 3.5–5.3)
POTASSIUM SERPL-SCNC: 3 MMOL/L — LOW (ref 3.5–5.3)
PROT SERPL-MCNC: 7.7 G/DL — SIGNIFICANT CHANGE UP (ref 6–8.3)
PROT UR-MCNC: 30 MG/DL
PROTHROM AB SERPL-ACNC: 13.6 SEC — HIGH (ref 10–12.9)
RBC # BLD: 4.53 M/UL — SIGNIFICANT CHANGE UP (ref 4.2–5.8)
RBC # FLD: 13.7 % — SIGNIFICANT CHANGE UP (ref 10.3–14.5)
RBC CASTS # UR COMP ASSIST: SIGNIFICANT CHANGE UP /HPF (ref 0–4)
RSV RESULT: SIGNIFICANT CHANGE UP
RSV RNA RESP QL NAA+PROBE: SIGNIFICANT CHANGE UP
SODIUM SERPL-SCNC: 136 MMOL/L — SIGNIFICANT CHANGE UP (ref 135–145)
SP GR SPEC: 1.02 — SIGNIFICANT CHANGE UP (ref 1.01–1.02)
UROBILINOGEN FLD QL: NEGATIVE MG/DL — SIGNIFICANT CHANGE UP
WBC # BLD: 7.3 K/UL — SIGNIFICANT CHANGE UP (ref 3.8–10.5)
WBC # FLD AUTO: 7.3 K/UL — SIGNIFICANT CHANGE UP (ref 3.8–10.5)
WBC UR QL: SIGNIFICANT CHANGE UP

## 2020-01-09 PROCEDURE — 71045 X-RAY EXAM CHEST 1 VIEW: CPT

## 2020-01-09 PROCEDURE — 85610 PROTHROMBIN TIME: CPT

## 2020-01-09 PROCEDURE — 85027 COMPLETE CBC AUTOMATED: CPT

## 2020-01-09 PROCEDURE — 81001 URINALYSIS AUTO W/SCOPE: CPT

## 2020-01-09 PROCEDURE — 93005 ELECTROCARDIOGRAM TRACING: CPT

## 2020-01-09 PROCEDURE — 87086 URINE CULTURE/COLONY COUNT: CPT

## 2020-01-09 PROCEDURE — 80053 COMPREHEN METABOLIC PANEL: CPT

## 2020-01-09 PROCEDURE — 36415 COLL VENOUS BLD VENIPUNCTURE: CPT

## 2020-01-09 PROCEDURE — 99284 EMERGENCY DEPT VISIT MOD MDM: CPT | Mod: 25

## 2020-01-09 PROCEDURE — 83605 ASSAY OF LACTIC ACID: CPT

## 2020-01-09 PROCEDURE — 87631 RESP VIRUS 3-5 TARGETS: CPT

## 2020-01-09 PROCEDURE — 87040 BLOOD CULTURE FOR BACTERIA: CPT

## 2020-01-09 PROCEDURE — 99283 EMERGENCY DEPT VISIT LOW MDM: CPT | Mod: 25

## 2020-01-09 PROCEDURE — 85730 THROMBOPLASTIN TIME PARTIAL: CPT

## 2020-01-09 RX ORDER — POTASSIUM CHLORIDE 20 MEQ
40 PACKET (EA) ORAL ONCE
Refills: 0 | Status: COMPLETED | OUTPATIENT
Start: 2020-01-09 | End: 2020-01-09

## 2020-01-09 RX ADMIN — Medication 650 MILLIGRAM(S): at 01:24

## 2020-01-09 RX ADMIN — Medication 75 MILLIGRAM(S): at 01:28

## 2020-01-09 RX ADMIN — Medication 40 MILLIEQUIVALENT(S): at 01:24

## 2020-01-09 NOTE — ED ADULT NURSE NOTE - NSIMPLEMENTINTERV_GEN_ALL_ED
Implemented All Universal Safety Interventions:  [place] to call system. Call bell, personal items and telephone within reach. Instruct patient to call for assistance. Room bathroom lighting operational. Non-slip footwear when patient is off stretcher. Physically safe environment: no spills, clutter or unnecessary equipment. Stretcher in lowest position, wheels locked, appropriate side rails in place.

## 2020-01-10 DIAGNOSIS — R50.9 FEVER, UNSPECIFIED: ICD-10-CM

## 2020-01-14 LAB
CULTURE RESULTS: SIGNIFICANT CHANGE UP
SPECIMEN SOURCE: SIGNIFICANT CHANGE UP

## 2020-01-15 LAB
CULTURE RESULTS: SIGNIFICANT CHANGE UP
SPECIMEN SOURCE: SIGNIFICANT CHANGE UP

## 2020-01-17 ENCOUNTER — RX RENEWAL (OUTPATIENT)
Age: 60
End: 2020-01-17

## 2020-01-17 NOTE — PRE-OP CHECKLIST - HOW ADMINISTERED
Render Post-Care Instructions In Note?: no Consent: The patient's consent was obtained including but not limited to risks of crusting, scabbing, blistering, scarring, darker or lighter pigmentary change, recurrence, incomplete removal and infection. Detail Level: Detailed Number Of Freeze-Thaw Cycles: 1 freeze-thaw cycle Post-Care Instructions: I reviewed with the patient in detail post-care instructions. Patient is to wear sunprotection, and avoid picking at any of the treated lesions. Pt may apply Vaseline to crusted or scabbing areas. Duration Of Freeze Thaw-Cycle (Seconds): 0 Self Administrated PAST SURGICAL HISTORY:   x3    S/P appendectomy     S/P cataract surgery bilateral    S/P mastectomy, left Partial    S/P mastectomy, left breast with revision    S/P Mohs surgery for basal cell carcinoma

## 2020-01-22 ENCOUNTER — APPOINTMENT (OUTPATIENT)
Dept: INTERNAL MEDICINE | Facility: CLINIC | Age: 60
End: 2020-01-22
Payer: MEDICARE

## 2020-01-22 PROCEDURE — 90471 IMMUNIZATION ADMIN: CPT

## 2020-01-22 PROCEDURE — 90750 HZV VACC RECOMBINANT IM: CPT

## 2020-02-13 ENCOUNTER — APPOINTMENT (OUTPATIENT)
Dept: BARIATRICS | Facility: CLINIC | Age: 60
End: 2020-02-13
Payer: MEDICARE

## 2020-02-13 VITALS
SYSTOLIC BLOOD PRESSURE: 130 MMHG | DIASTOLIC BLOOD PRESSURE: 90 MMHG | BODY MASS INDEX: 38.7 KG/M2 | OXYGEN SATURATION: 96 % | WEIGHT: 210.32 LBS | HEIGHT: 62 IN | HEART RATE: 62 BPM

## 2020-02-13 PROCEDURE — 99213 OFFICE O/P EST LOW 20 MIN: CPT

## 2020-02-13 RX ORDER — OMEPRAZOLE 20 MG/1
20 CAPSULE, DELAYED RELEASE ORAL
Qty: 90 | Refills: 2 | Status: COMPLETED | COMMUNITY
Start: 2017-04-12 | End: 2020-02-13

## 2020-02-13 RX ORDER — POTASSIUM CHLORIDE 1.5 G/1.58G
20 POWDER, FOR SOLUTION ORAL DAILY
Qty: 30 | Refills: 0 | Status: COMPLETED | COMMUNITY
Start: 2018-09-27 | End: 2020-02-13

## 2020-02-13 RX ORDER — OMEPRAZOLE 20 MG/1
20 TABLET, DELAYED RELEASE ORAL
Refills: 0 | Status: COMPLETED | COMMUNITY
End: 2020-02-13

## 2020-02-14 NOTE — PHYSICAL EXAM
[Obese, well nourished, in no acute distress] : obese, well nourished, in no acute distress [Normal] : affect appropriate [de-identified] : Lower extremity edema persists but improved. [de-identified] : Obese, soft, nontender, nondistended, positive bowel sounds in all four quadrants.  No hernia or masses. [de-identified] : Ambulating without difficulty or assistance

## 2020-02-14 NOTE — ASSESSMENT
[FreeTextEntry1] : Has lost an additional 8 lbs since last seen by me in Aug 2019.  Feels well.  Anxious as usual, unchanged.\par Complains of reflux and regurgitation.  Seems to be related to eating too quickly, eating too much and eating late.  He has not been complaint with avoiding lying down for bed at least 2 hours after eating.  This likely results in retention of undigested food within the sleeve refluxing with positional changes.\par Discussed strategies to avoid this behavior.  Continue PPIs\par \par Nutritional counseling has been provided. The patient is encouraged to remain calorie conscious and continue a low fat, low carbohydrate, high protein diet. Also, emphasis has been placed on the importance of adequate hydration, multi-vitamin supplementation and exercise.  (15 min)\par \par Known gallstones, remains asymptomatic.  Continue surveillance.\par \par Rx for blood work provided.\par f/u annually or sooner if needed.\par \par \par

## 2020-02-14 NOTE — HISTORY OF PRESENT ILLNESS
[de-identified] : Routine follow up.  No new complaints.  Still with intermittent reflux.  Usually post prandial.  Most notably when eating late. [Procedure: ___] : Procedure performed: [unfilled]  [Date of Surgery: ___] : Date of Surgery:   [unfilled] [Surgeon Name:   ___] : Surgeon Name: Dr. BRAGA

## 2020-03-12 ENCOUNTER — LABORATORY RESULT (OUTPATIENT)
Age: 60
End: 2020-03-12

## 2020-03-12 ENCOUNTER — NON-APPOINTMENT (OUTPATIENT)
Age: 60
End: 2020-03-12

## 2020-03-12 ENCOUNTER — APPOINTMENT (OUTPATIENT)
Dept: INTERNAL MEDICINE | Facility: CLINIC | Age: 60
End: 2020-03-12
Payer: MEDICARE

## 2020-03-12 VITALS — BODY MASS INDEX: 38.52 KG/M2 | WEIGHT: 212 LBS | HEIGHT: 62.25 IN

## 2020-03-12 PROCEDURE — 36415 COLL VENOUS BLD VENIPUNCTURE: CPT

## 2020-03-12 PROCEDURE — 99214 OFFICE O/P EST MOD 30 MIN: CPT | Mod: 25

## 2020-03-12 PROCEDURE — 93000 ELECTROCARDIOGRAM COMPLETE: CPT

## 2020-03-12 RX ORDER — NITROFURANTOIN MACROCRYSTALS 100 MG/1
100 CAPSULE ORAL
Qty: 14 | Refills: 0 | Status: DISCONTINUED | COMMUNITY
Start: 2019-12-19 | End: 2020-03-12

## 2020-03-12 NOTE — PHYSICAL EXAM
[Soft] : abdomen soft [No HSM] : no HSM [Normal] : normal gait, coordination grossly intact, no focal deficits and deep tendon reflexes were 2+ and symmetric [de-identified] : Furuncle left upper quadrant

## 2020-03-12 NOTE — HISTORY OF PRESENT ILLNESS
[de-identified] : This is a 59-year-old gentleman with a history of morbid obesity who is status post laparoscopic sleeve surgery which was complicated by a test for paresthesias on deficiency anemia. This surgery was performed because of morbid obesity. In addition the patient has a history of carcinoma of the bladder and diabetes mellitus who is here today for a followup visit

## 2020-03-12 NOTE — ASSESSMENT
[FreeTextEntry1] : Problems\par Bladder carcinoma\par Diabetes mellitus\par Gallstones\par Hypertension\par Status post para surgery\par Morbid obesity\par Assessment\par This is a massively obese patient who underwent a sleep surgery for obesity who has a history of bladder carcinoma, diabetes, gallstones, hypertension. I explained to the patient and he is aware of the impact that his low obesity has on his cardiovascular system his sugar and his cholesterol. He has been trying to lose weight and has joined an exercise program.\par 3/12/2020\par This patient with history of morbid obesity with complications thereof consisting of diabetes mellitus, hypercholesterolemia, the patient has not lost significant weight wall on his diet and since his surgery. He recently was evaluated by his urologist for bladder carcinoma. His vital signs were stable his physical examination was positive for his obesity. I discussed with him the importance and the impact that this has on his cardiovascular health and advised him to join [employer] and join a gym and exercise

## 2020-03-13 LAB
25(OH)D3 SERPL-MCNC: 49.2 NG/ML
ALBUMIN SERPL ELPH-MCNC: 4.4 G/DL
ALP BLD-CCNC: 83 U/L
ALT SERPL-CCNC: 14 U/L
ANION GAP SERPL CALC-SCNC: 13 MMOL/L
APPEARANCE: CLEAR
AST SERPL-CCNC: 15 U/L
BACTERIA: NEGATIVE
BASOPHILS # BLD AUTO: 0.08 K/UL
BASOPHILS NFR BLD AUTO: 1 %
BILIRUB SERPL-MCNC: 0.3 MG/DL
BILIRUBIN URINE: NEGATIVE
BLOOD URINE: NEGATIVE
BUN SERPL-MCNC: 24 MG/DL
CALCIUM SERPL-MCNC: 9.5 MG/DL
CHLORIDE SERPL-SCNC: 101 MMOL/L
CHOLEST SERPL-MCNC: 150 MG/DL
CHOLEST/HDLC SERPL: 3 RATIO
CO2 SERPL-SCNC: 26 MMOL/L
COLOR: YELLOW
CREAT SERPL-MCNC: 1.02 MG/DL
EOSINOPHIL # BLD AUTO: 0.29 K/UL
EOSINOPHIL NFR BLD AUTO: 3.5 %
ESTIMATED AVERAGE GLUCOSE: 120 MG/DL
FERRITIN SERPL-MCNC: 22 NG/ML
FOLATE SERPL-MCNC: >20 NG/ML
GLUCOSE QUALITATIVE U: NEGATIVE
GLUCOSE SERPL-MCNC: 121 MG/DL
HBA1C MFR BLD HPLC: 5.8 %
HCT VFR BLD CALC: 40 %
HDLC SERPL-MCNC: 50 MG/DL
HGB BLD-MCNC: 13.7 G/DL
HYALINE CASTS: 0 /LPF
IMM GRANULOCYTES NFR BLD AUTO: 0.2 %
KETONES URINE: NEGATIVE
LDLC SERPL CALC-MCNC: 85 MG/DL
LEUKOCYTE ESTERASE URINE: NEGATIVE
LYMPHOCYTES # BLD AUTO: 1.34 K/UL
LYMPHOCYTES NFR BLD AUTO: 16.3 %
MAN DIFF?: NORMAL
MCHC RBC-ENTMCNC: 29.2 PG
MCHC RBC-ENTMCNC: 34.3 GM/DL
MCV RBC AUTO: 85.3 FL
MICROSCOPIC-UA: NORMAL
MONOCYTES # BLD AUTO: 0.87 K/UL
MONOCYTES NFR BLD AUTO: 10.6 %
NEUTROPHILS # BLD AUTO: 5.64 K/UL
NEUTROPHILS NFR BLD AUTO: 68.4 %
NITRITE URINE: NEGATIVE
PH URINE: 6
PLATELET # BLD AUTO: 200 K/UL
POTASSIUM SERPL-SCNC: 3.8 MMOL/L
PROT SERPL-MCNC: 6.8 G/DL
PROTEIN URINE: ABNORMAL
PSA SERPL-MCNC: 0.72 NG/ML
RBC # BLD: 4.69 M/UL
RBC # FLD: 14.2 %
RED BLOOD CELLS URINE: 2 /HPF
SAVE SPECIMEN: NORMAL
SODIUM SERPL-SCNC: 141 MMOL/L
SPECIFIC GRAVITY URINE: 1.03
SQUAMOUS EPITHELIAL CELLS: 5 /HPF
T3RU NFR SERPL: 1 TBI
T4 SERPL-MCNC: 7.5 UG/DL
TRIGL SERPL-MCNC: 73 MG/DL
TSH SERPL-ACNC: 0.61 UIU/ML
URATE SERPL-MCNC: 5.2 MG/DL
UROBILINOGEN URINE: NORMAL
VIT B12 SERPL-MCNC: 1632 PG/ML
WBC # FLD AUTO: 8.24 K/UL
WHITE BLOOD CELLS URINE: 6 /HPF

## 2020-03-16 LAB — VIT A SERPL-MCNC: 73.3 UG/DL

## 2020-03-17 LAB
VIT B1 SERPL-MCNC: 176 NMOL/L
VIT B2 SERPL-MCNC: 169 UG/L

## 2020-03-18 LAB
VIT B12 USB SERPL-MCNC: 578 PG/ML
VIT B6 SERPL-MCNC: 15.9 UG/L
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
## 2020-04-23 ENCOUNTER — APPOINTMENT (OUTPATIENT)
Dept: INTERNAL MEDICINE | Facility: CLINIC | Age: 60
End: 2020-04-23
Payer: MEDICARE

## 2020-04-23 DIAGNOSIS — R63.4 ABNORMAL WEIGHT LOSS: ICD-10-CM

## 2020-04-23 PROCEDURE — 99213 OFFICE O/P EST LOW 20 MIN: CPT

## 2020-04-23 NOTE — ASSESSMENT
[FreeTextEntry1] : Problem\par Bladder cancer\par Pulmonary infiltrate\par Permit for surgery\par Diabetes\par Weight loss\par Plan\par I recommended to the patient that he proceed with noncontrast CT of the abdomen and pelvis and chest. The patient previously had a pneumonia in the right lower lobe and is scheduled for a followup CT

## 2020-04-23 NOTE — HISTORY OF PRESENT ILLNESS
[Verbal consent obtained from patient] : the patient, [unfilled] [de-identified] : This is a 59-year-old patient with history of diabetes mellitus, status post permanent surgery, carcinoma of the bladder, and was complaining of abnormal weight loss of 15 pounds over the past 3 months. He denies any abdominal or pulmonary symptoms.

## 2020-04-29 ENCOUNTER — APPOINTMENT (OUTPATIENT)
Dept: INTERNAL MEDICINE | Facility: CLINIC | Age: 60
End: 2020-04-29

## 2020-05-21 ENCOUNTER — APPOINTMENT (OUTPATIENT)
Dept: PULMONOLOGY | Facility: CLINIC | Age: 60
End: 2020-05-21
Payer: MEDICARE

## 2020-05-21 PROCEDURE — 99443: CPT

## 2020-06-18 ENCOUNTER — APPOINTMENT (OUTPATIENT)
Dept: INTERNAL MEDICINE | Facility: CLINIC | Age: 60
End: 2020-06-18
Payer: MEDICARE

## 2020-06-18 VITALS — TEMPERATURE: 98 F | HEIGHT: 62.25 IN | WEIGHT: 211 LBS | BODY MASS INDEX: 38.34 KG/M2

## 2020-06-18 PROCEDURE — 99448 NTRPROF PH1/NTRNET/EHR 21-30: CPT

## 2020-06-18 RX ORDER — ACETAMINOPHEN 500 MG/1
TABLET ORAL
Refills: 0 | Status: DISCONTINUED | COMMUNITY
End: 2020-06-18

## 2020-06-18 RX ORDER — NEOMYCIN SULFATE, POLYMYXIN B SULFATE, HYDROCORTISONE 3.5; 10000; 1 MG/ML; [USP'U]/ML; MG/ML
1 SOLUTION/ DROPS AURICULAR (OTIC) 4 TIMES DAILY
Qty: 1 | Refills: 1 | Status: DISCONTINUED | COMMUNITY
Start: 2018-09-27 | End: 2020-06-18

## 2020-06-18 NOTE — HISTORY OF PRESENT ILLNESS
[Home] : at home, [unfilled] , at the time of the visit. [Verbal consent obtained from patient] : the patient, [unfilled] [Medical Office: ([facility])___] : at the medical office located in  [de-identified] : This is a 60-year-old patient with a history of morbid obesity for which he underwent barometric surgery. In addition he has a history of bladder carcinoma, gastroparesis and recently was found to have a small nodule in his right lower lung. He was seen by pulmonary who advised a repeat CAT scan in mid to mid to end of September. The patient feels well and denies any shortness of breath or chest pains

## 2020-06-18 NOTE — ASSESSMENT
[FreeTextEntry1] : Problems\par Bladder carcinoma\par Diabetes mellitus\par Gallstones\par Hypertension\par Status post para surgery\par Morbid obesity\par Assessment\par This is a massively obese patient who underwent a sleep surgery for obesity who has a history of bladder carcinoma, diabetes, gallstones, hypertension. I explained to the patient and he is aware of the impact that his low obesity has on his cardiovascular system his sugar and his cholesterol. He has been trying to lose weight and has joined an exercise program.\par 3/12/2020\par This patient with history of morbid obesity with complications thereof consisting of diabetes mellitus, hypercholesterolemia, the patient has not lost significant weight wall on his diet and since his surgery. He recently was evaluated by his urologist for bladder carcinoma. His vital signs were stable his physical examination was positive for his obesity. I discussed with him the importance and the impact that this has on his cardiovascular health and advised him to join Weight Wat[employer] and join a gym and exercise\par 6/19/2020\par This is a 60-year-old gentleman with a history of bladder carcinoma, diabetes mellitus who recently and hypertension who recently was found to have a right lower lobe nodule. The patient was evaluated by a pulmonarywho advised a repeat CAT scan in 6 months. The patient does not have a history of smoking. Or any history of asbestos exposure He did have a resolving pneumonia. He presently feels well and denies any complaints.\par \par \par \par \par \par \par

## 2020-07-13 ENCOUNTER — RX CHANGE (OUTPATIENT)
Age: 60
End: 2020-07-13

## 2020-07-14 ENCOUNTER — RX CHANGE (OUTPATIENT)
Age: 60
End: 2020-07-14

## 2020-07-15 ENCOUNTER — RX CHANGE (OUTPATIENT)
Age: 60
End: 2020-07-15

## 2020-07-17 ENCOUNTER — RX RENEWAL (OUTPATIENT)
Age: 60
End: 2020-07-17

## 2020-08-31 NOTE — ED PROVIDER NOTE - DIAGNOSIS COUNSELING, MDM
conducted a detailed discussion... I had a detailed discussion with the patient and/or guardian regarding the historical points, exam findings, and any diagnostic results supporting the discharge/admit diagnosis. Double O-Z Flap Text: The defect edges were debeveled with a #15 scalpel blade.  Given the location of the defect, shape of the defect and the proximity to free margins a Double O-Z flap was deemed most appropriate.  Using a sterile surgical marker, an appropriate transposition flap was drawn incorporating the defect and placing the expected incisions within the relaxed skin tension lines where possible. The area thus outlined was incised deep to adipose tissue with a #15 scalpel blade.  The skin margins were undermined to an appropriate distance in all directions utilizing iris scissors.

## 2020-11-23 ENCOUNTER — APPOINTMENT (OUTPATIENT)
Dept: INTERNAL MEDICINE | Facility: CLINIC | Age: 60
End: 2020-11-23
Payer: MEDICARE

## 2020-11-23 ENCOUNTER — NON-APPOINTMENT (OUTPATIENT)
Age: 60
End: 2020-11-23

## 2020-11-23 ENCOUNTER — LABORATORY RESULT (OUTPATIENT)
Age: 60
End: 2020-11-23

## 2020-11-23 VITALS — HEIGHT: 62 IN | BODY MASS INDEX: 40.3 KG/M2 | TEMPERATURE: 98.3 F | WEIGHT: 219 LBS

## 2020-11-23 VITALS — DIASTOLIC BLOOD PRESSURE: 80 MMHG | SYSTOLIC BLOOD PRESSURE: 120 MMHG

## 2020-11-23 DIAGNOSIS — Z23 ENCOUNTER FOR IMMUNIZATION: ICD-10-CM

## 2020-11-23 LAB
25(OH)D3 SERPL-MCNC: 43.9 NG/ML
ALBUMIN SERPL ELPH-MCNC: 4.5 G/DL
ALP BLD-CCNC: 101 U/L
ALT SERPL-CCNC: 14 U/L
ANION GAP SERPL CALC-SCNC: 11 MMOL/L
AST SERPL-CCNC: 15 U/L
BASOPHILS # BLD AUTO: 0.06 K/UL
BASOPHILS NFR BLD AUTO: 0.6 %
BILIRUB SERPL-MCNC: 0.6 MG/DL
BUN SERPL-MCNC: 16 MG/DL
CALCIUM SERPL-MCNC: 9.8 MG/DL
CHLORIDE SERPL-SCNC: 98 MMOL/L
CHOLEST SERPL-MCNC: 149 MG/DL
CO2 SERPL-SCNC: 32 MMOL/L
CREAT SERPL-MCNC: 1.01 MG/DL
EOSINOPHIL # BLD AUTO: 0.36 K/UL
EOSINOPHIL NFR BLD AUTO: 3.6 %
ESTIMATED AVERAGE GLUCOSE: 126 MG/DL
FERRITIN SERPL-MCNC: 64 NG/ML
FOLATE SERPL-MCNC: >20 NG/ML
GLUCOSE SERPL-MCNC: 122 MG/DL
HBA1C MFR BLD HPLC: 6 %
HCT VFR BLD CALC: 42 %
HDLC SERPL-MCNC: 44 MG/DL
HGB BLD-MCNC: 14.3 G/DL
IMM GRANULOCYTES NFR BLD AUTO: 0.3 %
LDLC SERPL CALC-MCNC: 77 MG/DL
LYMPHOCYTES # BLD AUTO: 1.38 K/UL
LYMPHOCYTES NFR BLD AUTO: 13.9 %
MAN DIFF?: NORMAL
MCHC RBC-ENTMCNC: 30.2 PG
MCHC RBC-ENTMCNC: 34 GM/DL
MCV RBC AUTO: 88.8 FL
MONOCYTES # BLD AUTO: 0.92 K/UL
MONOCYTES NFR BLD AUTO: 9.2 %
NEUTROPHILS # BLD AUTO: 7.21 K/UL
NEUTROPHILS NFR BLD AUTO: 72.4 %
NONHDLC SERPL-MCNC: 105 MG/DL
PLATELET # BLD AUTO: 221 K/UL
POTASSIUM SERPL-SCNC: 3.9 MMOL/L
PROT SERPL-MCNC: 6.6 G/DL
PSA SERPL-MCNC: 0.75 NG/ML
RBC # BLD: 4.73 M/UL
RBC # FLD: 13.3 %
SAVE SPECIMEN: NORMAL
SODIUM SERPL-SCNC: 141 MMOL/L
T3RU NFR SERPL: 1.1 TBI
T4 SERPL-MCNC: 7.6 UG/DL
TRIGL SERPL-MCNC: 142 MG/DL
TSH SERPL-ACNC: 0.61 UIU/ML
URATE SERPL-MCNC: 5.7 MG/DL
VIT B12 SERPL-MCNC: >2000 PG/ML
WBC # FLD AUTO: 9.96 K/UL

## 2020-11-23 PROCEDURE — 93000 ELECTROCARDIOGRAM COMPLETE: CPT

## 2020-11-23 PROCEDURE — G0008: CPT

## 2020-11-23 PROCEDURE — 99214 OFFICE O/P EST MOD 30 MIN: CPT | Mod: 25

## 2020-11-23 PROCEDURE — 90686 IIV4 VACC NO PRSV 0.5 ML IM: CPT

## 2020-11-23 PROCEDURE — 36415 COLL VENOUS BLD VENIPUNCTURE: CPT

## 2020-11-23 NOTE — PHYSICAL EXAM
[Soft] : abdomen soft [No HSM] : no HSM [Normal] : normal gait, coordination grossly intact, no focal deficits and deep tendon reflexes were 2+ and symmetric [de-identified] : Furuncle left upper quadrant

## 2020-11-23 NOTE — HISTORY OF PRESENT ILLNESS
[de-identified] : This is a 59-year-old gentleman with a history of morbid obesity who is status post laparoscopic sleeve surgery which was complicated by a test for paresthesias on deficiency anemia. This surgery was performed because of morbid obesity. In addition the patient has a history of carcinoma of the bladder and diabetes mellitus who is here today for a followup visit

## 2020-11-23 NOTE — ASSESSMENT
[FreeTextEntry1] : Problems\par Bladder carcinoma\par Diabetes mellitus\par Gallstones\par Hypertension\par Status post para surgery\par Morbid obesity\par Assessment\par This is a massively obese patient who underwent a sleep surgery for obesity who has a history of bladder carcinoma, diabetes, gallstones, hypertension. I explained to the patient and he is aware of the impact that his low obesity has on his cardiovascular system his sugar and his cholesterol. He has been trying to lose weight and has joined an exercise program.\par 3/12/2020\par This patient with history of morbid obesity with complications thereof consisting of diabetes mellitus, hypercholesterolemia, the patient has not lost significant weight wall on his diet and since his surgery. He recently was evaluated by his urologist for bladder carcinoma. His vital signs were stable his physical examination was positive for his obesity. I discussed with him the importance and the impact that this has on his cardiovascular health and advised him to join Weight Wat[employer] and join a gym and exercise\par 11/23/2020\par Problems\par Bladder carcinoma\par Diabetes mellitus\par Gastroparesis\par Hypertension\par Right pulmonary nodule\par Paramedics surgery\par Assessment\par The patient has not lost any weight since his last visit. I discussed with him the importance of doing so especially at its impact on his blood pressure and also upon his diabetes mellitus. I advised him to join Weight Watchers his bladder carcinoma is pink care for by his urologist. His blood pressure was well-controlled. A referral in for repeat CT of the chest noncontrast to assess his right pulmonary nodule. Bloods were drawn to check his A1c and lipids are in I also referred him to his pulmonary specialist

## 2020-11-24 LAB
APPEARANCE: CLEAR
BACTERIA: NEGATIVE
BILIRUBIN URINE: NEGATIVE
BLOOD URINE: NEGATIVE
COLOR: YELLOW
GLUCOSE QUALITATIVE U: NEGATIVE
HYALINE CASTS: 6 /LPF
KETONES URINE: NEGATIVE
LEUKOCYTE ESTERASE URINE: NEGATIVE
MICROSCOPIC-UA: NORMAL
NITRITE URINE: NEGATIVE
PH URINE: 6
PROTEIN URINE: ABNORMAL
RED BLOOD CELLS URINE: 3 /HPF
SPECIFIC GRAVITY URINE: 1.03
SQUAMOUS EPITHELIAL CELLS: 5 /HPF
UROBILINOGEN URINE: NORMAL
WHITE BLOOD CELLS URINE: 5 /HPF

## 2020-11-25 ENCOUNTER — APPOINTMENT (OUTPATIENT)
Dept: PULMONOLOGY | Facility: CLINIC | Age: 60
End: 2020-11-25
Payer: MEDICARE

## 2020-11-25 VITALS
HEART RATE: 57 BPM | WEIGHT: 223 LBS | BODY MASS INDEX: 41.04 KG/M2 | DIASTOLIC BLOOD PRESSURE: 97 MMHG | RESPIRATION RATE: 15 BRPM | SYSTOLIC BLOOD PRESSURE: 193 MMHG | OXYGEN SATURATION: 97 % | HEIGHT: 62 IN

## 2020-11-25 VITALS — TEMPERATURE: 98 F

## 2020-11-25 PROCEDURE — 99213 OFFICE O/P EST LOW 20 MIN: CPT

## 2020-11-25 NOTE — HISTORY OF PRESENT ILLNESS
[TextBox_4] : 60 year old male with past medical history of morbid obesity s/p laparoscopic sleeve, remote history of bladder cancer, HTN, and DMII presents for follow up for lung nodule.  He had a CT scan about 6 months ago that showed a subcentimeter groundglass opacity in his left upper lobe-he had aspiration pneumonia in 2018.  \par He currently feels well and denies and shortness of breath, chest discomfort, wheezing, cough.  He is here today to schedule a repeat CT scan.

## 2020-11-25 NOTE — ASSESSMENT
[FreeTextEntry1] : 60 year old male with past medical history of morbid obesity s/p laparoscopic sleeve, remote history of bladder cancer, HTN, and DMII presents for follow up for lung nodule.  Pt is here today to schedule a repeat CT scan of the chest, which has been ordered.  Will follow up once results are back.

## 2020-12-21 PROBLEM — N39.0 BACTERIAL UTI: Status: RESOLVED | Noted: 2019-03-10 | Resolved: 2020-12-21

## 2020-12-21 PROBLEM — N39.0 BACTERIAL UTI: Status: RESOLVED | Noted: 2019-12-19 | Resolved: 2020-12-21

## 2020-12-21 PROBLEM — N39.0 E. COLI UTI: Status: RESOLVED | Noted: 2018-09-07 | Resolved: 2020-12-21

## 2020-12-31 ENCOUNTER — NON-APPOINTMENT (OUTPATIENT)
Age: 60
End: 2020-12-31

## 2021-01-28 ENCOUNTER — RX RENEWAL (OUTPATIENT)
Age: 61
End: 2021-01-28

## 2021-02-11 ENCOUNTER — APPOINTMENT (OUTPATIENT)
Dept: BARIATRICS | Facility: CLINIC | Age: 61
End: 2021-02-11
Payer: MEDICARE

## 2021-02-11 VITALS
WEIGHT: 217.81 LBS | HEIGHT: 62 IN | DIASTOLIC BLOOD PRESSURE: 87 MMHG | BODY MASS INDEX: 40.08 KG/M2 | TEMPERATURE: 97.8 F | OXYGEN SATURATION: 97 % | HEART RATE: 59 BPM | SYSTOLIC BLOOD PRESSURE: 160 MMHG

## 2021-02-11 PROCEDURE — 99072 ADDL SUPL MATRL&STAF TM PHE: CPT

## 2021-02-11 PROCEDURE — 99214 OFFICE O/P EST MOD 30 MIN: CPT

## 2021-02-27 NOTE — PHYSICAL EXAM
[Obese, well nourished, in no acute distress] : obese, well nourished, in no acute distress [Normal] : affect appropriate [de-identified] : Lower extremity edema persists but improved. [de-identified] : Obese, soft, nontender, nondistended, positive bowel sounds in all four quadrants.  No hernia or masses. [de-identified] : Ambulating without difficulty or assistance

## 2021-02-27 NOTE — ASSESSMENT
[FreeTextEntry1] : 4 years s/p Lap Sleeve\par Weight stable.  BMI remains 40.  Down 80 lbs from surgery.\par Occasional reflux.  Vomits once/2 months or so if not paying attention to food.\par \par Nutritional counseling has been provided. The patient is encouraged to remain calorie conscious and continue a low fat, low carbohydrate, high protein diet. Also, emphasis has been placed on the importance of adequate hydration, multi-vitamin supplementation and exercise.  (15 min)\par \par Bloodwork reviewed from Nov 2020.\par Rx for blood work\par \par f/u annually.

## 2021-04-05 ENCOUNTER — RESULT CHARGE (OUTPATIENT)
Age: 61
End: 2021-04-05

## 2021-04-06 ENCOUNTER — NON-APPOINTMENT (OUTPATIENT)
Age: 61
End: 2021-04-06

## 2021-04-06 ENCOUNTER — APPOINTMENT (OUTPATIENT)
Dept: INTERNAL MEDICINE | Facility: CLINIC | Age: 61
End: 2021-04-06
Payer: MEDICARE

## 2021-04-06 VITALS — HEIGHT: 62 IN | WEIGHT: 216 LBS | TEMPERATURE: 97.9 F | BODY MASS INDEX: 39.75 KG/M2

## 2021-04-06 DIAGNOSIS — D50.9 IRON DEFICIENCY ANEMIA, UNSPECIFIED: ICD-10-CM

## 2021-04-06 DIAGNOSIS — L02.223: ICD-10-CM

## 2021-04-06 DIAGNOSIS — D64.9 ANEMIA, UNSPECIFIED: ICD-10-CM

## 2021-04-06 PROCEDURE — 99214 OFFICE O/P EST MOD 30 MIN: CPT | Mod: 25

## 2021-04-06 PROCEDURE — 93000 ELECTROCARDIOGRAM COMPLETE: CPT

## 2021-04-06 PROCEDURE — 99072 ADDL SUPL MATRL&STAF TM PHE: CPT

## 2021-04-06 PROCEDURE — 36415 COLL VENOUS BLD VENIPUNCTURE: CPT

## 2021-04-06 NOTE — ASSESSMENT
[FreeTextEntry1] : Problems\par Bladder carcinoma\par Diabetes mellitus\par Gallstones\par Hypertension\par Status post para surgery\par Morbid obesity\par Assessment\par This is a massively obese patient who underwent a sleep surgery for obesity who has a history of bladder carcinoma, diabetes, gallstones, hypertension. I explained to the patient and he is aware of the impact that his low obesity has on his cardiovascular system his sugar and his cholesterol. He has been trying to lose weight and has joined an exercise program.\par 3/12/2020\par This patient with history of morbid obesity with complications thereof consisting of diabetes mellitus, hypercholesterolemia, the patient has not lost significant weight wall on his diet and since his surgery. He recently was evaluated by his urologist for bladder carcinoma. His vital signs were stable his physical examination was positive for his obesity. I discussed with him the importance and the impact that this has on his cardiovascular health and advised him to join Weight Watchers and join a gym and exercise\par 11/23/2020\par Problems\par Bladder carcinoma\par Diabetes mellitus\par Gastroparesis\par Hypertension\par Right pulmonary nodule\par Paramedics surgery\par Assessment\par The patient has not lost any weight since his last visit. I discussed with him the importance of doing so especially at its impact on his blood pressure and also upon his diabetes mellitus. I advised him to join Weight Watchers his bladder carcinoma is pink care for by his urologist. His blood pressure was well-controlled. A referral in for repeat CT of the chest noncontrast to assess his right pulmonary nodule. Bloods were drawn to check his A1c and lipids are in I also referred him to his pulmonary specialist\par 46/21\par Problem problems\par Bladder carcinoma\par Diabetes mellitus\par Gastroparesis\par Hypertension\par Gastric sleeve surgery\par Sleep apnea\par The patient recently was seen by his urologist to assess his bladder carcinoma.  He found no evidence of microscopic hematuria.  He is scheduled for CAT scan to review the pelvic area and to exclude the possibility of any recurrent disease or retroperitoneal lymphadenopathy.  The patient denies any evidence of hematuria.  And presently there is no evidence of microscopic hematuria.\par Diabetes mellitus-the patient continues to lose weight he lost 2 pounds since his last visit.  As mentioned he did have gastric sleeve surgery and this is being followed by his general surgeon.  I instructed the patient on the importance of restricting his carbohydrates both for his weight and also for his diabetes mellitus.  And I spoke to him also about the importance of exercise.

## 2021-04-06 NOTE — PHYSICAL EXAM
[Soft] : abdomen soft [No HSM] : no HSM [Normal] : normal gait, coordination grossly intact, no focal deficits and deep tendon reflexes were 2+ and symmetric [de-identified] : Furuncle left upper quadrant

## 2021-04-06 NOTE — PLAN
[FreeTextEntry1] : Patient has a small follicle above left nipple on his left chest.  I started him on doxycycline 100 mg twice daily for a week and referred him to dermatology for possible drainage

## 2021-04-06 NOTE — HISTORY OF PRESENT ILLNESS
[de-identified] : This is a 60-year-old patient with a history of bladder carcinoma, diabetes mellitus, hypertension, morbid obesity, status post laparoscopic sleeve surgery and sleep apnea

## 2021-04-08 LAB
24R-OH-CALCIDIOL SERPL-MCNC: 29 PG/ML
25(OH)D3 SERPL-MCNC: 46.3 NG/ML
ALBUMIN SERPL ELPH-MCNC: 4.4 G/DL
ALP BLD-CCNC: 84 U/L
ALT SERPL-CCNC: 16 U/L
ANION GAP SERPL CALC-SCNC: 12 MMOL/L
APPEARANCE: CLEAR
APTT BLD: 27.8 SEC
AST SERPL-CCNC: 16 U/L
BACTERIA: ABNORMAL
BASOPHILS # BLD AUTO: 0.07 K/UL
BASOPHILS NFR BLD AUTO: 0.9 %
BILIRUB DIRECT SERPL-MCNC: 0.1 MG/DL
BILIRUB INDIRECT SERPL-MCNC: 0.3 MG/DL
BILIRUB SERPL-MCNC: 0.5 MG/DL
BILIRUBIN URINE: NEGATIVE
BLOOD URINE: NEGATIVE
BUN SERPL-MCNC: 16 MG/DL
CALCIUM SERPL-MCNC: 10.5 MG/DL
CHLORIDE SERPL-SCNC: 99 MMOL/L
CHOLEST SERPL-MCNC: 172 MG/DL
CO2 SERPL-SCNC: 29 MMOL/L
COLOR: YELLOW
CREAT SERPL-MCNC: 1.05 MG/DL
EOSINOPHIL # BLD AUTO: 0.51 K/UL
EOSINOPHIL NFR BLD AUTO: 6.3 %
ESTIMATED AVERAGE GLUCOSE: 120 MG/DL
FERRITIN SERPL-MCNC: 61 NG/ML
FOLATE SERPL-MCNC: >20 NG/ML
GLUCOSE QUALITATIVE U: NEGATIVE
GLUCOSE SERPL-MCNC: 104 MG/DL
HBA1C MFR BLD HPLC: 5.8 %
HCT VFR BLD CALC: 41.4 %
HDLC SERPL-MCNC: 57 MG/DL
HGB BLD-MCNC: 14 G/DL
HYALINE CASTS: 2 /LPF
IMM GRANULOCYTES NFR BLD AUTO: 0.2 %
IRON SATN MFR SERPL: 33 %
IRON SERPL-MCNC: 115 UG/DL
KETONES URINE: NORMAL
LDLC SERPL CALC-MCNC: 90 MG/DL
LEUKOCYTE ESTERASE URINE: ABNORMAL
LYMPHOCYTES # BLD AUTO: 1.41 K/UL
LYMPHOCYTES NFR BLD AUTO: 17.3 %
MAN DIFF?: NORMAL
MCHC RBC-ENTMCNC: 29.4 PG
MCHC RBC-ENTMCNC: 33.8 GM/DL
MCV RBC AUTO: 86.8 FL
MICROSCOPIC-UA: NORMAL
MONOCYTES # BLD AUTO: 0.88 K/UL
MONOCYTES NFR BLD AUTO: 10.8 %
NEUTROPHILS # BLD AUTO: 5.27 K/UL
NEUTROPHILS NFR BLD AUTO: 64.5 %
NITRITE URINE: POSITIVE
NONHDLC SERPL-MCNC: 116 MG/DL
PH URINE: 5.5
PLATELET # BLD AUTO: 219 K/UL
POTASSIUM SERPL-SCNC: 4.1 MMOL/L
PROT SERPL-MCNC: 6.9 G/DL
PROTEIN URINE: ABNORMAL
PSA SERPL-MCNC: 0.98 NG/ML
RBC # BLD: 4.77 M/UL
RBC # FLD: 14.1 %
RED BLOOD CELLS URINE: 4 /HPF
SAVE SPECIMEN: NORMAL
SODIUM SERPL-SCNC: 140 MMOL/L
SPECIFIC GRAVITY URINE: 1.03
SQUAMOUS EPITHELIAL CELLS: 2 /HPF
T3FREE SERPL-MCNC: 3.22 PG/ML
T4 FREE SERPL-MCNC: 1.2 NG/DL
T4 SERPL-MCNC: 7.1 UG/DL
TIBC SERPL-MCNC: 344 UG/DL
TRIGL SERPL-MCNC: 128 MG/DL
TSH SERPL-ACNC: 0.96 UIU/ML
UIBC SERPL-MCNC: 229 UG/DL
URATE SERPL-MCNC: 6.2 MG/DL
UROBILINOGEN URINE: NORMAL
VIT B12 SERPL-MCNC: 1766 PG/ML
WBC # FLD AUTO: 8.16 K/UL
WHITE BLOOD CELLS URINE: 43 /HPF

## 2021-04-13 LAB — VIT B2 SERPL-MCNC: 354 UG/L

## 2021-04-14 LAB — VIT A SERPL-MCNC: 81 UG/DL

## 2021-04-15 VITALS — SYSTOLIC BLOOD PRESSURE: 140 MMHG | DIASTOLIC BLOOD PRESSURE: 80 MMHG

## 2021-04-15 LAB — VIT B1 SERPL-MCNC: 160.7 NMOL/L

## 2021-04-16 LAB — VIT B6 SERPL-MCNC: 17.4 UG/L

## 2021-04-22 NOTE — PATIENT PROFILE ADULT. - AS SC BRADEN MOBILITY
LOV 01/19/2021     Disp Refills Start End    cyclobenzaprine (FLEXERIL) 10 MG tablet 30 tablet 0 3/6/2021     Sig: TAKE 1 TABLET AT BEDTIME AS NEEDED FOR SLEEP      Will forward to Dr. Salinas for approval.   (3) slightly limited

## 2021-05-06 ENCOUNTER — APPOINTMENT (OUTPATIENT)
Dept: INTERNAL MEDICINE | Facility: CLINIC | Age: 61
End: 2021-05-06
Payer: SELF-PAY

## 2021-05-06 PROCEDURE — 90471 IMMUNIZATION ADMIN: CPT

## 2021-05-06 PROCEDURE — 90750 HZV VACC RECOMBINANT IM: CPT

## 2021-05-12 LAB — BACTERIA UR CULT: NORMAL

## 2021-06-21 NOTE — PATIENT PROFILE ADULT. - PATIENT REPRESENTATIVE NAME
Letter by Jericho Patel MD at      Author: Jericho Patel [facility].    Sincerely,        Electronically signed by Jericho Patel MD        amaya

## 2021-07-20 ENCOUNTER — LABORATORY RESULT (OUTPATIENT)
Age: 61
End: 2021-07-20

## 2021-07-20 ENCOUNTER — APPOINTMENT (OUTPATIENT)
Dept: INTERNAL MEDICINE | Facility: CLINIC | Age: 61
End: 2021-07-20
Payer: MEDICARE

## 2021-07-20 VITALS — DIASTOLIC BLOOD PRESSURE: 80 MMHG | SYSTOLIC BLOOD PRESSURE: 140 MMHG

## 2021-07-20 VITALS — BODY MASS INDEX: 39.12 KG/M2 | WEIGHT: 212.6 LBS | HEIGHT: 62 IN

## 2021-07-20 PROCEDURE — 99214 OFFICE O/P EST MOD 30 MIN: CPT | Mod: 25

## 2021-07-20 PROCEDURE — 99072 ADDL SUPL MATRL&STAF TM PHE: CPT

## 2021-07-20 PROCEDURE — 36415 COLL VENOUS BLD VENIPUNCTURE: CPT

## 2021-07-20 RX ORDER — DOXYCYCLINE HYCLATE 100 MG/1
100 CAPSULE ORAL
Qty: 14 | Refills: 0 | Status: DISCONTINUED | COMMUNITY
Start: 2021-04-06 | End: 2021-07-20

## 2021-07-20 NOTE — HISTORY OF PRESENT ILLNESS
[de-identified] : This is a 60-year-old patient with a history of bladder carcinoma, diabetes mellitus, hypertension, morbid obesity, status post laparoscopic sleeve surgery and sleep apnea

## 2021-07-20 NOTE — PHYSICAL EXAM
[Soft] : abdomen soft [No HSM] : no HSM [Normal] : normal gait, coordination grossly intact, no focal deficits and deep tendon reflexes were 2+ and symmetric [de-identified] : Furuncle left upper quadrant

## 2021-07-20 NOTE — ASSESSMENT
[FreeTextEntry1] : Problems\par Bladder carcinoma\par Diabetes mellitus\par Gallstones\par Hypertension\par Status post para surgery\par Morbid obesity\par Assessment\par This is a massively obese patient who underwent a sleep surgery for obesity who has a history of bladder carcinoma, diabetes, gallstones, hypertension. I explained to the patient and he is aware of the impact that his low obesity has on his cardiovascular system his sugar and his cholesterol. He has been trying to lose weight and has joined an exercise program.\par 3/12/2020\par This patient with history of morbid obesity with complications thereof consisting of diabetes mellitus, hypercholesterolemia, the patient has not lost significant weight wall on his diet and since his surgery. He recently was evaluated by his urologist for bladder carcinoma. His vital signs were stable his physical examination was positive for his obesity. I discussed with him the importance and the impact that this has on his cardiovascular health and advised him to join Weight Watchers and join a gym and exercise\par 11/23/2020\par Problems\par Bladder carcinoma\par Diabetes mellitus\par Gastroparesis\par Hypertension\par Right pulmonary nodule\par Paramedics surgery\par Assessment\par The patient has not lost any weight since his last visit. I discussed with him the importance of doing so especially at its impact on his blood pressure and also upon his diabetes mellitus. I advised him to join Weight Watchers his bladder carcinoma is pink care for by his urologist. His blood pressure was well-controlled. A referral in for repeat CT of the chest noncontrast to assess his right pulmonary nodule. Bloods were drawn to check his A1c and lipids are in I also referred him to his pulmonary specialist\par 46/21\par Problem problems\par Bladder carcinoma\par Diabetes mellitus\par Gastroparesis\par Hypertension\par Gastric sleeve surgery\par Sleep apnea\par The patient recently was seen by his urologist to assess his bladder carcinoma.  He found no evidence of microscopic hematuria.  He is scheduled for CAT scan to review the pelvic area and to exclude the possibility of any recurrent disease or retroperitoneal lymphadenopathy.  The patient denies any evidence of hematuria.  And presently there is no evidence of microscopic hematuria.\par Diabetes mellitus-the patient continues to lose weight he lost 2 pounds since his last visit.  As mentioned he did have gastric sleeve surgery and this is being followed by his general surgeon.  I instructed the patient on the importance of restricting his carbohydrates both for his weight and also for his diabetes mellitus.  And I spoke to him also about the importance of exercise.\par 7/20/21\par This is a 61-year-old gentleman with a host of medical issues most urgently recently discovered bladder carcinoma his recent CAT scan did not reveal any evidence of any metastatic lesions.  In addition he had microhematuria in the past which has resolved.  He denies any abdominal pains or dysuria.  In addition the patient is suffers from obesity and we have spoken about this many many times.  His blood pressure is well controlled and he is status post gastric sleeve surgery.  He is complaining of mild dysuria and frequency.  Urine culture was obtained and he was started on doxycycline 100 mg daily for 7 days\par

## 2021-07-21 LAB
25(OH)D3 SERPL-MCNC: 47.4 NG/ML
ALBUMIN SERPL ELPH-MCNC: 4.5 G/DL
ALP BLD-CCNC: 93 U/L
ALT SERPL-CCNC: 19 U/L
ANION GAP SERPL CALC-SCNC: 14 MMOL/L
APPEARANCE: CLEAR
AST SERPL-CCNC: 17 U/L
BACTERIA: NEGATIVE
BASOPHILS # BLD AUTO: 0.07 K/UL
BASOPHILS NFR BLD AUTO: 0.8 %
BILIRUB SERPL-MCNC: 0.4 MG/DL
BILIRUBIN URINE: NEGATIVE
BLOOD URINE: NEGATIVE
BUN SERPL-MCNC: 22 MG/DL
CALCIUM SERPL-MCNC: 10.3 MG/DL
CHLORIDE SERPL-SCNC: 102 MMOL/L
CHOLEST SERPL-MCNC: 156 MG/DL
CO2 SERPL-SCNC: 27 MMOL/L
COLOR: YELLOW
CREAT SERPL-MCNC: 1.1 MG/DL
EOSINOPHIL # BLD AUTO: 0.37 K/UL
EOSINOPHIL NFR BLD AUTO: 4.1 %
ESTIMATED AVERAGE GLUCOSE: 120 MG/DL
FERRITIN SERPL-MCNC: 69 NG/ML
FOLATE SERPL-MCNC: >20 NG/ML
GLUCOSE QUALITATIVE U: ABNORMAL
GLUCOSE SERPL-MCNC: 99 MG/DL
HBA1C MFR BLD HPLC: 5.8 %
HCT VFR BLD CALC: 41.4 %
HDLC SERPL-MCNC: 45 MG/DL
HGB BLD-MCNC: 14.5 G/DL
HYALINE CASTS: 1 /LPF
IMM GRANULOCYTES NFR BLD AUTO: 0.3 %
KETONES URINE: NEGATIVE
LDLC SERPL CALC-MCNC: 84 MG/DL
LEUKOCYTE ESTERASE URINE: NEGATIVE
LYMPHOCYTES # BLD AUTO: 1.31 K/UL
LYMPHOCYTES NFR BLD AUTO: 14.5 %
MAN DIFF?: NORMAL
MCHC RBC-ENTMCNC: 31 PG
MCHC RBC-ENTMCNC: 35 GM/DL
MCV RBC AUTO: 88.7 FL
MICROSCOPIC-UA: NORMAL
MONOCYTES # BLD AUTO: 0.96 K/UL
MONOCYTES NFR BLD AUTO: 10.6 %
NEUTROPHILS # BLD AUTO: 6.28 K/UL
NEUTROPHILS NFR BLD AUTO: 69.7 %
NITRITE URINE: NEGATIVE
NONHDLC SERPL-MCNC: 111 MG/DL
PH URINE: 5.5
PLATELET # BLD AUTO: 240 K/UL
POTASSIUM SERPL-SCNC: 4.8 MMOL/L
PROT SERPL-MCNC: 7.1 G/DL
PROTEIN URINE: ABNORMAL
PSA SERPL-MCNC: 0.79 NG/ML
RBC # BLD: 4.67 M/UL
RBC # FLD: 13.9 %
RED BLOOD CELLS URINE: 1 /HPF
SODIUM SERPL-SCNC: 142 MMOL/L
SPECIFIC GRAVITY URINE: 1.03
SQUAMOUS EPITHELIAL CELLS: 1 /HPF
T3RU NFR SERPL: 1.1 TBI
T4 SERPL-MCNC: 7.9 UG/DL
TRIGL SERPL-MCNC: 134 MG/DL
TSH SERPL-ACNC: 0.42 UIU/ML
URATE SERPL-MCNC: 6.1 MG/DL
UROBILINOGEN URINE: NORMAL
VIT B12 SERPL-MCNC: 1469 PG/ML
WBC # FLD AUTO: 9.02 K/UL
WHITE BLOOD CELLS URINE: 2 /HPF

## 2021-08-04 NOTE — DISCHARGE NOTE ADULT - DO YOU HAVE DIFFICULTY CLIMBING STAIRS
Due to patient having transportation issues, 4 post surgical mastectomy bras were mailed to patient, today. She will receive , t-shirt bra, 40B, beige. She will call with fit issues.    No

## 2021-10-04 ENCOUNTER — RX RENEWAL (OUTPATIENT)
Age: 61
End: 2021-10-04

## 2021-11-08 ENCOUNTER — APPOINTMENT (OUTPATIENT)
Dept: INTERNAL MEDICINE | Facility: CLINIC | Age: 61
End: 2021-11-08
Payer: MEDICARE

## 2021-11-08 ENCOUNTER — LABORATORY RESULT (OUTPATIENT)
Age: 61
End: 2021-11-08

## 2021-11-08 VITALS — DIASTOLIC BLOOD PRESSURE: 70 MMHG | SYSTOLIC BLOOD PRESSURE: 140 MMHG

## 2021-11-08 VITALS — BODY MASS INDEX: 39.56 KG/M2 | HEIGHT: 62 IN | WEIGHT: 215 LBS

## 2021-11-08 DIAGNOSIS — R60.9 EDEMA, UNSPECIFIED: ICD-10-CM

## 2021-11-08 PROCEDURE — G0008: CPT

## 2021-11-08 PROCEDURE — 36415 COLL VENOUS BLD VENIPUNCTURE: CPT

## 2021-11-08 PROCEDURE — 90686 IIV4 VACC NO PRSV 0.5 ML IM: CPT

## 2021-11-08 PROCEDURE — 99214 OFFICE O/P EST MOD 30 MIN: CPT | Mod: 25

## 2021-11-08 NOTE — ASSESSMENT
[FreeTextEntry1] : Problems\par Bladder carcinoma\par Diabetes mellitus\par Gallstones\par Hypertension\par Status post para surgery\par Morbid obesity\par Assessment\par This is a massively obese patient who underwent a sleep surgery for obesity who has a history of bladder carcinoma, diabetes, gallstones, hypertension. I explained to the patient and he is aware of the impact that his low obesity has on his cardiovascular system his sugar and his cholesterol. He has been trying to lose weight and has joined an exercise program.\par 3/12/2020\par This patient with history of morbid obesity with complications thereof consisting of diabetes mellitus, hypercholesterolemia, the patient has not lost significant weight wall on his diet and since his surgery. He recently was evaluated by his urologist for bladder carcinoma. His vital signs were stable his physical examination was positive for his obesity. I discussed with him the importance and the impact that this has on his cardiovascular health and advised him to join Weight Watchers and join a gym and exercise\par 11/23/2020\par Problems\par Bladder carcinoma\par Diabetes mellitus\par Gastroparesis\par Hypertension\par Right pulmonary nodule\par Paramedics surgery\par Assessment\par The patient has not lost any weight since his last visit. I discussed with him the importance of doing so especially at its impact on his blood pressure and also upon his diabetes mellitus. I advised him to join Weight Watchers his bladder carcinoma is pink care for by his urologist. His blood pressure was well-controlled. A referral in for repeat CT of the chest noncontrast to assess his right pulmonary nodule. Bloods were drawn to check his A1c and lipids are in I also referred him to his pulmonary specialist\par 46/21\par Problem problems\par Bladder carcinoma\par Diabetes mellitus\par Gastroparesis\par Hypertension\par Gastric sleeve surgery\par Sleep apnea\par The patient recently was seen by his urologist to assess his bladder carcinoma.  He found no evidence of microscopic hematuria.  He is scheduled for CAT scan to review the pelvic area and to exclude the possibility of any recurrent disease or retroperitoneal lymphadenopathy.  The patient denies any evidence of hematuria.  And presently there is no evidence of microscopic hematuria.\par Diabetes mellitus-the patient continues to lose weight he lost 2 pounds since his last visit.  As mentioned he did have gastric sleeve surgery and this is being followed by his general surgeon.  I instructed the patient on the importance of restricting his carbohydrates both for his weight and also for his diabetes mellitus.  And I spoke to him also about the importance of exercise.\par 11/8/21\par This is a 61-year-old gentleman with a history of bladder carcinoma, diabetes mellitus, morbid obesity for which he had laparoscopic sleeve surgery, and hypertension. The patient was recently evaluated by his urologist via CAT scan which failed to reveal any evidence of recurrence or retroperitoneal adenopathy. In addition the patient continues to lose weight and has decreased his carbohydrate intake. He feels well and denies any chest pains or shortness of breath. His blood pressure was well controlled his physical examination is positive for obesity bloods and an A1c were obtained

## 2021-11-08 NOTE — PHYSICAL EXAM
[Soft] : abdomen soft [No HSM] : no HSM [Normal] : normal gait, coordination grossly intact, no focal deficits and deep tendon reflexes were 2+ and symmetric [de-identified] : Furuncle left upper quadrant

## 2021-11-08 NOTE — HISTORY OF PRESENT ILLNESS
[de-identified] : This is a 60-year-old patient with a history of bladder carcinoma, diabetes mellitus, hypertension, morbid obesity, status post laparoscopic sleeve surgery and sleep apnea

## 2021-11-09 ENCOUNTER — RX RENEWAL (OUTPATIENT)
Age: 61
End: 2021-11-09

## 2021-11-10 LAB
APPEARANCE: CLEAR
BACTERIA: NEGATIVE
BASOPHILS # BLD AUTO: 0.09 K/UL
BASOPHILS NFR BLD AUTO: 0.9 %
BILIRUBIN URINE: NEGATIVE
BLOOD URINE: NEGATIVE
COLOR: YELLOW
EOSINOPHIL # BLD AUTO: 0.46 K/UL
EOSINOPHIL NFR BLD AUTO: 4.5 %
ESTIMATED AVERAGE GLUCOSE: 120 MG/DL
GLUCOSE QUALITATIVE U: NEGATIVE
HBA1C MFR BLD HPLC: 5.8 %
HCT VFR BLD CALC: 41.9 %
HGB BLD-MCNC: 13.9 G/DL
HYALINE CASTS: 0 /LPF
IMM GRANULOCYTES NFR BLD AUTO: 0.5 %
KETONES URINE: NEGATIVE
LEUKOCYTE ESTERASE URINE: NEGATIVE
LYMPHOCYTES # BLD AUTO: 1.82 K/UL
LYMPHOCYTES NFR BLD AUTO: 17.9 %
MAN DIFF?: NORMAL
MCHC RBC-ENTMCNC: 30.2 PG
MCHC RBC-ENTMCNC: 33.2 GM/DL
MCV RBC AUTO: 90.9 FL
MICROSCOPIC-UA: NORMAL
MONOCYTES # BLD AUTO: 0.97 K/UL
MONOCYTES NFR BLD AUTO: 9.5 %
NEUTROPHILS # BLD AUTO: 6.8 K/UL
NEUTROPHILS NFR BLD AUTO: 66.7 %
NITRITE URINE: NEGATIVE
PH URINE: 5.5
PLATELET # BLD AUTO: 265 K/UL
PROTEIN URINE: ABNORMAL
RBC # BLD: 4.61 M/UL
RBC # FLD: 14 %
RED BLOOD CELLS URINE: 1 /HPF
SPECIFIC GRAVITY URINE: 1.02
SQUAMOUS EPITHELIAL CELLS: 2 /HPF
UROBILINOGEN URINE: NORMAL
WBC # FLD AUTO: 10.19 K/UL
WHITE BLOOD CELLS URINE: 2 /HPF

## 2021-11-11 LAB
25(OH)D3 SERPL-MCNC: 38.7 NG/ML
ALBUMIN SERPL ELPH-MCNC: 4.5 G/DL
ALP BLD-CCNC: 90 U/L
ALT SERPL-CCNC: 18 U/L
ANION GAP SERPL CALC-SCNC: 18 MMOL/L
AST SERPL-CCNC: 13 U/L
BILIRUB SERPL-MCNC: 0.4 MG/DL
BUN SERPL-MCNC: 19 MG/DL
CALCIUM SERPL-MCNC: 10.4 MG/DL
CHLORIDE SERPL-SCNC: 102 MMOL/L
CHOLEST SERPL-MCNC: 159 MG/DL
CO2 SERPL-SCNC: 23 MMOL/L
CREAT SERPL-MCNC: 0.92 MG/DL
FERRITIN SERPL-MCNC: 51 NG/ML
FOLATE SERPL-MCNC: >20 NG/ML
GLUCOSE SERPL-MCNC: 92 MG/DL
HDLC SERPL-MCNC: 46 MG/DL
LDLC SERPL CALC-MCNC: 69 MG/DL
NONHDLC SERPL-MCNC: 113 MG/DL
POTASSIUM SERPL-SCNC: 4.6 MMOL/L
PROT SERPL-MCNC: 6.9 G/DL
PSA SERPL-MCNC: 0.77 NG/ML
SODIUM SERPL-SCNC: 142 MMOL/L
T3RU NFR SERPL: 1.1 TBI
T4 SERPL-MCNC: 8 UG/DL
TRIGL SERPL-MCNC: 220 MG/DL
TSH SERPL-ACNC: 0.52 UIU/ML
URATE SERPL-MCNC: 5.2 MG/DL
VIT B12 SERPL-MCNC: 1313 PG/ML

## 2022-02-08 ENCOUNTER — APPOINTMENT (OUTPATIENT)
Dept: INTERNAL MEDICINE | Facility: CLINIC | Age: 62
End: 2022-02-08
Payer: COMMERCIAL

## 2022-02-08 ENCOUNTER — LABORATORY RESULT (OUTPATIENT)
Age: 62
End: 2022-02-08

## 2022-02-08 VITALS — SYSTOLIC BLOOD PRESSURE: 140 MMHG | DIASTOLIC BLOOD PRESSURE: 70 MMHG

## 2022-02-08 VITALS — WEIGHT: 207 LBS | HEIGHT: 62 IN | BODY MASS INDEX: 38.09 KG/M2

## 2022-02-08 DIAGNOSIS — R09.89 OTHER SPECIFIED SYMPTOMS AND SIGNS INVOLVING THE CIRCULATORY AND RESPIRATORY SYSTEMS: ICD-10-CM

## 2022-02-08 DIAGNOSIS — K31.84 GASTROPARESIS: ICD-10-CM

## 2022-02-08 PROCEDURE — 99214 OFFICE O/P EST MOD 30 MIN: CPT | Mod: 25

## 2022-02-08 PROCEDURE — 36415 COLL VENOUS BLD VENIPUNCTURE: CPT

## 2022-02-08 NOTE — HISTORY OF PRESENT ILLNESS
[de-identified] : This is a 60-year-old patient with a history of bladder carcinoma, diabetes mellitus, hypertension, morbid obesity, status post laparoscopic sleeve surgery and sleep apnea

## 2022-02-08 NOTE — PHYSICAL EXAM
[Soft] : abdomen soft [No HSM] : no HSM [Normal] : normal gait, coordination grossly intact, no focal deficits and deep tendon reflexes were 2+ and symmetric [de-identified] : Furuncle left upper quadrant

## 2022-02-08 NOTE — ASSESSMENT
[FreeTextEntry1] : Problems\par Bladder carcinoma\par Diabetes mellitus\par Gallstones\par Hypertension\par Status post para surgery\par Morbid obesity\par Assessment\par This is a massively obese patient who underwent a sleep surgery for obesity who has a history of bladder carcinoma, diabetes, gallstones, hypertension. I explained to the patient and he is aware of the impact that his low obesity has on his cardiovascular system his sugar and his cholesterol. He has been trying to lose weight and has joined an exercise program.\par 3/12/2020\par This patient with history of morbid obesity with complications thereof consisting of diabetes mellitus, hypercholesterolemia, the patient has not lost significant weight wall on his diet and since his surgery. He recently was evaluated by his urologist for bladder carcinoma. His vital signs were stable his physical examination was positive for his obesity. I discussed with him the importance and the impact that this has on his cardiovascular health and advised him to join Weight Watchers and join a gym and exercise\par 11/23/2020\par Problems\par Bladder carcinoma\par Diabetes mellitus\par Gastroparesis\par Hypertension\par Right pulmonary nodule\par Paramedics surgery\par Assessment\par The patient has not lost any weight since his last visit. I discussed with him the importance of doing so especially at its impact on his blood pressure and also upon his diabetes mellitus. I advised him to join Weight Watchers his bladder carcinoma is pink care for by his urologist. His blood pressure was well-controlled. A referral in for repeat CT of the chest noncontrast to assess his right pulmonary nodule. Bloods were drawn to check his A1c and lipids are in I also referred him to his pulmonary specialist\par 46/21\par Problem problems\par Bladder carcinoma\par Diabetes mellitus\par Gastroparesis\par Hypertension\par Gastric sleeve surgery\par Sleep apnea\par The patient recently was seen by his urologist to assess his bladder carcinoma.  He found no evidence of microscopic hematuria.  He is scheduled for CAT scan to review the pelvic area and to exclude the possibility of any recurrent disease or retroperitoneal lymphadenopathy.  The patient denies any evidence of hematuria.  And presently there is no evidence of microscopic hematuria.\par Diabetes mellitus-the patient continues to lose weight he lost 2 pounds since his last visit.  As mentioned he did have gastric sleeve surgery and this is being followed by his general surgeon.  I instructed the patient on the importance of restricting his carbohydrates both for his weight and also for his diabetes mellitus.  And I spoke to him also about the importance of exercise.\par 11/8/21\par This is a 61-year-old gentleman with a history of bladder carcinoma, diabetes mellitus, morbid obesity for which he had laparoscopic sleeve surgery, and hypertension. The patient was recently evaluated by his urologist via CAT scan which failed to reveal any evidence of recurrence or retroperitoneal adenopathy. In addition the patient continues to lose weight and has decreased his carbohydrate intake. He feels well and denies any chest pains or shortness of breath. His blood pressure was well controlled his physical examination is positive for obesity bloods and an A1c were obtained\par This is a 61-year-old gentleman with a history of bladder carcinoma, elevated BMI to 39, diabetes mellitus, gastroparesis, hypertension who is here today for a follow-up visit positive the patient feels well and does not offer any complaints.  He is being followed by his urologist for his bladder carcinoma.  He is also up-to-date with all his immunizations.

## 2022-02-09 LAB
25(OH)D3 SERPL-MCNC: 47.2 NG/ML
ALBUMIN SERPL ELPH-MCNC: 4.4 G/DL
ALP BLD-CCNC: 98 U/L
ALT SERPL-CCNC: 19 U/L
ANION GAP SERPL CALC-SCNC: 13 MMOL/L
APPEARANCE: ABNORMAL
AST SERPL-CCNC: 16 U/L
BACTERIA: ABNORMAL
BASOPHILS # BLD AUTO: 0.06 K/UL
BASOPHILS NFR BLD AUTO: 0.6 %
BILIRUB SERPL-MCNC: 0.6 MG/DL
BILIRUBIN URINE: NEGATIVE
BLOOD URINE: NORMAL
BUN SERPL-MCNC: 22 MG/DL
CALCIUM SERPL-MCNC: 9.6 MG/DL
CHLORIDE SERPL-SCNC: 99 MMOL/L
CHOLEST SERPL-MCNC: 133 MG/DL
CO2 SERPL-SCNC: 27 MMOL/L
COLOR: YELLOW
CREAT SERPL-MCNC: 0.97 MG/DL
EOSINOPHIL # BLD AUTO: 0.41 K/UL
EOSINOPHIL NFR BLD AUTO: 4.4 %
ESTIMATED AVERAGE GLUCOSE: 114 MG/DL
FERRITIN SERPL-MCNC: 59 NG/ML
FOLATE SERPL-MCNC: >20 NG/ML
GLUCOSE QUALITATIVE U: NEGATIVE
GLUCOSE SERPL-MCNC: 95 MG/DL
HBA1C MFR BLD HPLC: 5.6 %
HCT VFR BLD CALC: 43 %
HDLC SERPL-MCNC: 52 MG/DL
HGB BLD-MCNC: 14.4 G/DL
HYALINE CASTS: 5 /LPF
IMM GRANULOCYTES NFR BLD AUTO: 0.2 %
KETONES URINE: NEGATIVE
LDLC SERPL CALC-MCNC: 64 MG/DL
LEUKOCYTE ESTERASE URINE: ABNORMAL
LYMPHOCYTES # BLD AUTO: 1.25 K/UL
LYMPHOCYTES NFR BLD AUTO: 13.5 %
MAN DIFF?: NORMAL
MCHC RBC-ENTMCNC: 29.3 PG
MCHC RBC-ENTMCNC: 33.5 GM/DL
MCV RBC AUTO: 87.6 FL
MICROSCOPIC-UA: NORMAL
MONOCYTES # BLD AUTO: 0.98 K/UL
MONOCYTES NFR BLD AUTO: 10.6 %
NEUTROPHILS # BLD AUTO: 6.55 K/UL
NEUTROPHILS NFR BLD AUTO: 70.7 %
NITRITE URINE: POSITIVE
NONHDLC SERPL-MCNC: 82 MG/DL
PH URINE: 5.5
PLATELET # BLD AUTO: 254 K/UL
POTASSIUM SERPL-SCNC: 4 MMOL/L
PROT SERPL-MCNC: 6.9 G/DL
PROTEIN URINE: ABNORMAL
RBC # BLD: 4.91 M/UL
RBC # FLD: 14.6 %
RED BLOOD CELLS URINE: 2 /HPF
SODIUM SERPL-SCNC: 139 MMOL/L
SPECIFIC GRAVITY URINE: 1.02
SQUAMOUS EPITHELIAL CELLS: 0 /HPF
T3RU NFR SERPL: 1 TBI
T4 SERPL-MCNC: 8 UG/DL
TRIGL SERPL-MCNC: 86 MG/DL
TSH SERPL-ACNC: 0.74 UIU/ML
URATE SERPL-MCNC: 5.1 MG/DL
UROBILINOGEN URINE: NORMAL
VIT B12 SERPL-MCNC: 1527 PG/ML
WBC # FLD AUTO: 9.27 K/UL
WHITE BLOOD CELLS URINE: 140 /HPF

## 2022-02-10 ENCOUNTER — APPOINTMENT (OUTPATIENT)
Dept: BARIATRICS | Facility: CLINIC | Age: 62
End: 2022-02-10
Payer: COMMERCIAL

## 2022-02-10 VITALS
WEIGHT: 206 LBS | TEMPERATURE: 96.8 F | HEART RATE: 62 BPM | BODY MASS INDEX: 37.91 KG/M2 | OXYGEN SATURATION: 98 % | DIASTOLIC BLOOD PRESSURE: 68 MMHG | SYSTOLIC BLOOD PRESSURE: 140 MMHG | HEIGHT: 62 IN

## 2022-02-10 DIAGNOSIS — R82.90 UNSPECIFIED ABNORMAL FINDINGS IN URINE: ICD-10-CM

## 2022-02-10 PROCEDURE — 99214 OFFICE O/P EST MOD 30 MIN: CPT

## 2022-02-11 PROBLEM — R82.90 ABNORMAL URINALYSIS: Status: ACTIVE | Noted: 2022-02-11

## 2022-02-11 NOTE — PHYSICAL EXAM
[Obese, well nourished, in no acute distress] : obese, well nourished, in no acute distress [Normal] : affect appropriate [de-identified] : Lower extremity edema persists but improved. [de-identified] : Obese, soft, nontender, nondistended, positive bowel sounds in all four quadrants.  No hernia or masses. [de-identified] : Ambulating without difficulty or assistance

## 2022-02-11 NOTE — ASSESSMENT
[FreeTextEntry1] : Unremarkable post operative course.  Weight remains stable.  Maintains ~80 lb weight loss over 5 years.\par Tolerating diet.  Remains compliant with MVI supplementation.\par Follows regularly with PMD.  Labs recently performed and personally reviewed and discussed with the patient.\par He was concerned about hypokalemia however potassium levels normal (4.0).  Hgb A1C stable at 5.8\par Urinalysis turbid with nitrites and LE, WBC and bacteria suggestive of UTI.  States he's been referred to Urologist.  He does have history of previous bladder cancer treatment.\par \par With regards to his weight loss and sleeve gastrectomy he continues to do well.  Continue monitoring PO intake, eating slowly, chewing well and making appropriate dietary choices.\par Nutritional counseling has been provided. The patient is encouraged to remain calorie conscious and continue a low fat, low carbohydrate, high protein diet. Also, emphasis has been placed on the importance of adequate hydration, multi-vitamin supplementation and exercise.  (15 min)\par \par f/u annually.

## 2022-02-11 NOTE — HISTORY OF PRESENT ILLNESS
[de-identified] : Routine post operative bariatric follow up.  no new complaints.  weight remains stable.  5 years s/p lap Sleeve Gastrectomy. [Procedure: ___] : Procedure performed: [unfilled]  [Date of Surgery: ___] : Date of Surgery:   [unfilled] [Surgeon Name:   ___] : Surgeon Name: Dr. BRAGA

## 2022-02-23 LAB — BACTERIA UR CULT: NORMAL

## 2022-05-24 ENCOUNTER — LABORATORY RESULT (OUTPATIENT)
Age: 62
End: 2022-05-24

## 2022-05-24 ENCOUNTER — NON-APPOINTMENT (OUTPATIENT)
Age: 62
End: 2022-05-24

## 2022-05-24 ENCOUNTER — APPOINTMENT (OUTPATIENT)
Dept: INTERNAL MEDICINE | Facility: CLINIC | Age: 62
End: 2022-05-24
Payer: MEDICARE

## 2022-05-24 VITALS — BODY MASS INDEX: 37.69 KG/M2 | HEIGHT: 62 IN | WEIGHT: 204.8 LBS

## 2022-05-24 VITALS — SYSTOLIC BLOOD PRESSURE: 140 MMHG | DIASTOLIC BLOOD PRESSURE: 70 MMHG

## 2022-05-24 DIAGNOSIS — K80.20 CALCULUS OF GALLBLADDER W/OUT CHOLECYSTITIS W/OUT OBSTRUCTION: ICD-10-CM

## 2022-05-24 PROCEDURE — 99213 OFFICE O/P EST LOW 20 MIN: CPT | Mod: 25

## 2022-05-24 PROCEDURE — 93000 ELECTROCARDIOGRAM COMPLETE: CPT | Mod: 59

## 2022-05-24 PROCEDURE — 36415 COLL VENOUS BLD VENIPUNCTURE: CPT

## 2022-05-24 PROCEDURE — G0439: CPT

## 2022-05-24 NOTE — HEALTH RISK ASSESSMENT
[No falls in past year] : Patient reported no falls in the past year [0] : 2) Feeling down, depressed, or hopeless: Not at all (0) [Fully functional (bathing, dressing, toileting, transferring, walking, feeding)] : Fully functional (bathing, dressing, toileting, transferring, walking, feeding) [Fully functional (using the telephone, shopping, preparing meals, housekeeping, doing laundry, using] : Fully functional and needs no help or supervision to perform IADLs (using the telephone, shopping, preparing meals, housekeeping, doing laundry, using transportation, managing medications and managing finances) [Discussed at today's visit] : Advance Directives Discussed at today's visit [] : No [[id number]] : 0

## 2022-05-24 NOTE — ASSESSMENT
[FreeTextEntry1] : Patient vital signs were stable. His physical examination is normal except for being overweight. His EKG does not show any acute changes and blood were obtained

## 2022-05-24 NOTE — HISTORY OF PRESENT ILLNESS
[de-identified] : This is a patient with a history of massive obesity who had barometric surgery. He also has a history of  carcinoma and recently had a cystoscopy which was normal. In addition he just underwent a colonoscopy and was found to have 2 benign polyps. He should today for his annual examination

## 2022-05-24 NOTE — HEALTH RISK ASSESSMENT
[No falls in past year] : Patient reported no falls in the past year [0] : 2) Feeling down, depressed, or hopeless: Not at all (0) [Fully functional (bathing, dressing, toileting, transferring, walking, feeding)] : Fully functional (bathing, dressing, toileting, transferring, walking, feeding) [Fully functional (using the telephone, shopping, preparing meals, housekeeping, doing laundry, using] : Fully functional and needs no help or supervision to perform IADLs (using the telephone, shopping, preparing meals, housekeeping, doing laundry, using transportation, managing medications and managing finances) [Discussed at today's visit] : Advance Directives Discussed at today's visit [[id number]] : 0

## 2022-05-24 NOTE — ASSESSMENT
[FreeTextEntry1] : Problems\par Sleep apnea\par Morbid obesity\par Bladder carcinoma\par Pulmonary nodule\par Barometric surgery\par Assessment\par The patient's blood pressure and physical examination was normal aside for his weight.  I advised the patient that he is due for a noncontrast CT of the chest to assess his right pulmonary nodule.  In addition bloods were drawn to check his PSA level A1c and cholesterol.

## 2022-05-24 NOTE — PHYSICAL EXAM
[Normal Voice/Communication] : normal voice/communication [Ill-Appearing] : ill-appearing [Normal Sclera/Conjunctiva] : normal sclera/conjunctiva [PERRL] : pupils equal round and reactive to light [EOMI] : extraocular movements intact [Normal Outer Ear/Nose] : the outer ears and nose were normal in appearance [Normal Oropharynx] : the oropharynx was normal [No JVD] : no jugular venous distention [Supple] : supple [No Lymphadenopathy] : no lymphadenopathy [No Respiratory Distress] : no respiratory distress  [Clear to Auscultation] : lungs were clear to auscultation bilaterally [No Accessory Muscle Use] : no accessory muscle use [Normal Rate] : normal rate  [Regular Rhythm] : with a regular rhythm [Normal S1, S2] : normal S1 and S2 [Soft] : abdomen soft [Non Tender] : non-tender [No HSM] : no HSM [Normal Bowel Sounds] : normal bowel sounds [No Stool to Guaiac] : no stool to guaiac [Normal Sphincter Tone] : normal sphincter tone [Stool Occult Blood] : stool negative for occult blood

## 2022-05-24 NOTE — HISTORY OF PRESENT ILLNESS
[de-identified] : This is a patient with a history of massive obesity who had barometric surgery. He also has a history of  carcinoma and recently had a cystoscopy which was normal. In addition he just underwent a colonoscopy and was found to have 2 benign polyps. He should today for his annual examination

## 2022-05-26 NOTE — H&P PST ADULT - PEDAL EDEMA SEVERITY
Please review. Protocol failed / No protocol. Requested Prescriptions   Pending Prescriptions Disp Refills    HYDROcodone-acetaminophen (NORCO)  MG Oral Tab 30 tablet 0     Sig: Take 1 tablet by mouth every 6 (six) hours as needed for Pain.         There is no refill protocol information for this order               Recent Outpatient Visits              3 days ago Moderate persistent extrinsic asthma without complication    [facility], [address] [facility], Jennifer Blake MD    Office Visit    1 week ago Environmental allergies    [facility] [place], Neeru Heredia MD    Office Visit    1 month ago Sinusitis, unspecified chronicity, unspecified location    [address], [facility] [address], [place] Jessica Cagle, [place],     Telemedicine    2 months ago Acute maxillary sinusitis, recurrence not specified    [address]    Nurse Only    2 months ago Acute maxillary sinusitis, recurrence not specified    [address] Hooper, [address] [place] [address]    Telemedicine 1+

## 2022-05-29 LAB
25(OH)D3 SERPL-MCNC: 42.2 NG/ML
ALBUMIN SERPL ELPH-MCNC: 4.3 G/DL
ALP BLD-CCNC: 93 U/L
ALT SERPL-CCNC: 15 U/L
ANION GAP SERPL CALC-SCNC: 13 MMOL/L
APPEARANCE: ABNORMAL
AST SERPL-CCNC: 15 U/L
BACTERIA UR CULT: ABNORMAL
BACTERIA: ABNORMAL
BASOPHILS # BLD AUTO: 0.07 K/UL
BASOPHILS NFR BLD AUTO: 0.7 %
BILIRUB SERPL-MCNC: 0.4 MG/DL
BILIRUBIN URINE: NEGATIVE
BLOOD URINE: NEGATIVE
BUN SERPL-MCNC: 19 MG/DL
CALCIUM SERPL-MCNC: 9.6 MG/DL
CHLORIDE SERPL-SCNC: 101 MMOL/L
CHOLEST SERPL-MCNC: 149 MG/DL
CO2 SERPL-SCNC: 26 MMOL/L
COLOR: YELLOW
CREAT SERPL-MCNC: 0.93 MG/DL
EGFR: 93 ML/MIN/1.73M2
EOSINOPHIL # BLD AUTO: 0.48 K/UL
EOSINOPHIL NFR BLD AUTO: 4.7 %
ESTIMATED AVERAGE GLUCOSE: 120 MG/DL
FERRITIN SERPL-MCNC: 87 NG/ML
FOLATE SERPL-MCNC: >20 NG/ML
GLUCOSE QUALITATIVE U: ABNORMAL
GLUCOSE SERPL-MCNC: 176 MG/DL
HBA1C MFR BLD HPLC: 5.8 %
HCT VFR BLD CALC: 37.1 %
HDLC SERPL-MCNC: 47 MG/DL
HGB BLD-MCNC: 13.4 G/DL
HYALINE CASTS: 0 /LPF
IMM GRANULOCYTES NFR BLD AUTO: 0.4 %
KETONES URINE: NEGATIVE
LDLC SERPL CALC-MCNC: 62 MG/DL
LEUKOCYTE ESTERASE URINE: ABNORMAL
LYMPHOCYTES # BLD AUTO: 1.28 K/UL
LYMPHOCYTES NFR BLD AUTO: 12.6 %
MAN DIFF?: NORMAL
MCHC RBC-ENTMCNC: 31.2 PG
MCHC RBC-ENTMCNC: 36.1 GM/DL
MCV RBC AUTO: 86.3 FL
MICROSCOPIC-UA: NORMAL
MONOCYTES # BLD AUTO: 0.95 K/UL
MONOCYTES NFR BLD AUTO: 9.4 %
NEUTROPHILS # BLD AUTO: 7.33 K/UL
NEUTROPHILS NFR BLD AUTO: 72.2 %
NITRITE URINE: NEGATIVE
NONHDLC SERPL-MCNC: 102 MG/DL
PH URINE: 5.5
PLATELET # BLD AUTO: 223 K/UL
POTASSIUM SERPL-SCNC: 3.4 MMOL/L
PROT SERPL-MCNC: 6.4 G/DL
PROTEIN URINE: NORMAL
PSA SERPL-MCNC: 1.47 NG/ML
RBC # BLD: 4.3 M/UL
RBC # FLD: 13.7 %
RED BLOOD CELLS URINE: 1 /HPF
SODIUM SERPL-SCNC: 139 MMOL/L
SPECIFIC GRAVITY URINE: 1.02
SQUAMOUS EPITHELIAL CELLS: 1 /HPF
T3RU NFR SERPL: 1.1 TBI
T4 SERPL-MCNC: 6.9 UG/DL
TRIGL SERPL-MCNC: 201 MG/DL
TSH SERPL-ACNC: 0.73 UIU/ML
URATE SERPL-MCNC: 6 MG/DL
UROBILINOGEN URINE: NORMAL
VIT B12 SERPL-MCNC: 1213 PG/ML
WBC # FLD AUTO: 10.15 K/UL
WHITE BLOOD CELLS URINE: 61 /HPF

## 2022-06-13 ENCOUNTER — LABORATORY RESULT (OUTPATIENT)
Age: 62
End: 2022-06-13

## 2022-06-18 LAB
APPEARANCE: CLEAR
BILIRUBIN URINE: NEGATIVE
BLOOD URINE: NEGATIVE
COLOR: YELLOW
GLUCOSE QUALITATIVE U: NEGATIVE
KETONES URINE: NEGATIVE
LEUKOCYTE ESTERASE URINE: NEGATIVE
NITRITE URINE: NEGATIVE
PH URINE: 5.5
PROTEIN URINE: ABNORMAL
SPECIFIC GRAVITY URINE: 1.02
UROBILINOGEN URINE: NORMAL

## 2022-06-23 ENCOUNTER — RX CHANGE (OUTPATIENT)
Age: 62
End: 2022-06-23

## 2022-08-16 NOTE — ED PROVIDER NOTE - DISPOSITION TYPE
Individual Psychotherapy (PhD/LCSW)  08/16/2022     Site/Location:  [facility]     Visit Type: 30 min outpt individual psychotherapy     Therapeutic Intervention: Met with patient Outpatient - Interactive psychotherapy 30 min - CPT code 99635     Chief complaint/reason for encounter: Trauma     Interval history and content of current session: Trauma Hx: Pt has experienced many traumas related to witnessing deaths while working as a . He denies a hx of emotional, physical or sexual abuse.       Last session, pt reported feeling an increase in anger over the past few months. He and his wife noticed he has an increase in frustration, impatience and agitation. Pt was unable to identify a triggering event or situations. This session, pt began ImTT to address trauma sxs such as anger. He identified the color red to represent his anger. Pt participated well in session noting a decrease in overall anger. He was receptive to therapist feedback. Pt notes he is looking forward to attending his daughter's dance competitions with his family. He continues to report feeling good about his marriage and his work. Pt denied witnessing traumatic events at work which he would like to process in therapy. Pt notes he would like to try to take a break from ImTT to see how he does now that he focused on removing anger. Therapist agreed. He verbalized he will contact clinic if he believes he is in need for future therapy sessions. No further follow up needed by this clinician.         Treatment plan:  ? Target symptoms: trauma  ? Why chosen therapy is appropriate versus another modality: Relevant to diagnosis  ? Outcome monitoring methods: self-report  ? Therapeutic intervention type: supportive psychotherapy     Risk parameters:  Patient reports no suicidal ideation  Patient reports no homicidal ideation  Patient reports no self-injurious behavior  Patient reports no violent behavior     Verbal  deficits: None     Patient's response to intervention:  The patient's response to intervention is accepting.     Progress toward goals and other mental status changes:  The patient's progress toward goals is excellent.     Diagnosis: Post Traumatic Stress Disorder        Plan:  individual psychotherapy     Return to clinic: Pt notes he will contact the clinic if he believes he is in need for another ImTT session     Length of Service (minutes): 30        Each patient to whom he or she provides medical services by telemedicine is: (1) informed of the relationship between the physician and patient and the respective role of any other health care provider with respect to management of the patient; and (2) notified that he or she may decline to receive medical services by telemedicine and may withdraw from such care at any time.     DISCHARGE

## 2022-09-06 NOTE — PRE-OP CHECKLIST - SIDE RAILS UP
Mode of arrival (squad #, walk in, police, etc) : [place]        Chief complaint(s): Hip Pain s/p Fall        Arrival Note (brief scenario, treatment PTA, etc). : Pt reports she has had increased weakness and falls x3 years, worsening in the last year. Per pt she fell 2 weeks ago resulting in a left wrist fracture and was at [facility] in [place], [place]. Pt reports she had an incident with a bathtub there and her  pulled her out of the facility and brought her home. Since then pt has had multiple falls at home. C= \"Have you ever felt that you should Cut down on your drinking? \"  No  A= \"Have people Annoyed you by criticizing your drinking? \"  No  G= \"Have you ever felt bad or Guilty about your drinking? \"  No  E= \"Have you ever had a drink as an Eye-opener first thing in the morning to steady your nerves or to help a hangover? \"  No      Deferred []      Reason for deferring: N/A    *If yes to two or more: probable alcohol abuse. *
done

## 2022-09-23 ENCOUNTER — RX RENEWAL (OUTPATIENT)
Age: 62
End: 2022-09-23

## 2022-09-29 ENCOUNTER — APPOINTMENT (OUTPATIENT)
Dept: INTERNAL MEDICINE | Facility: CLINIC | Age: 62
End: 2022-09-29

## 2022-09-29 ENCOUNTER — LABORATORY RESULT (OUTPATIENT)
Age: 62
End: 2022-09-29

## 2022-09-29 VITALS — SYSTOLIC BLOOD PRESSURE: 140 MMHG | DIASTOLIC BLOOD PRESSURE: 70 MMHG

## 2022-09-29 VITALS — HEIGHT: 62 IN | BODY MASS INDEX: 39.56 KG/M2 | WEIGHT: 215 LBS

## 2022-09-29 PROCEDURE — 99214 OFFICE O/P EST MOD 30 MIN: CPT | Mod: 25

## 2022-09-29 PROCEDURE — 90662 IIV NO PRSV INCREASED AG IM: CPT

## 2022-09-29 PROCEDURE — G0008: CPT

## 2022-09-29 PROCEDURE — 36415 COLL VENOUS BLD VENIPUNCTURE: CPT | Mod: 59

## 2022-09-29 NOTE — ASSESSMENT
[FreeTextEntry1] : Problems\par Bladder carcinoma\par Diabetes mellitus\par Gallstones\par Hypertension\par Status post para surgery\par Morbid obesity\par Assessment\par This is a massively obese patient who underwent a sleep surgery for obesity who has a history of bladder carcinoma, diabetes, gallstones, hypertension. I explained to the patient and he is aware of the impact that his low obesity has on his cardiovascular system his sugar and his cholesterol. He has been trying to lose weight and has joined an exercise program.\par 3/12/2020\par This patient with history of morbid obesity with complications thereof consisting of diabetes mellitus, hypercholesterolemia, the patient has not lost significant weight wall on his diet and since his surgery. He recently was evaluated by his urologist for bladder carcinoma. His vital signs were stable his physical examination was positive for his obesity. I discussed with him the importance and the impact that this has on his cardiovascular health and advised him to join [employer] and join a gym and exercise\par 11/23/2020\par Problems\par Bladder carcinoma\par Diabetes mellitus\par Gastroparesis\par Hypertension\par Right pulmonary nodule\par Paramedics surgery\par Assessment\par The patient has not lost any weight since his last visit. I discussed with him the importance of doing so especially at its impact on his blood pressure and also upon his diabetes mellitus. I advised him to join Weight [employer] his bladder carcinoma is pink care for by his urologist. His blood pressure was well-controlled. A referral in for repeat CT of the chest noncontrast to assess his right pulmonary nodule. Bloods were drawn to check his A1c and lipids are in I also referred him to his pulmonary specialist\par 46/21\par Problem problems\par Bladder carcinoma\par Diabetes mellitus\par Gastroparesis\par Hypertension\par Gastric sleeve surgery\par Sleep apnea\par The patient recently was seen by his urologist to assess his bladder carcinoma.  He found no evidence of microscopic hematuria.  He is scheduled for CAT scan to review the pelvic area and to exclude the possibility of any recurrent disease or retroperitoneal lymphadenopathy.  The patient denies any evidence of hematuria.  And presently there is no evidence of microscopic hematuria.\par Diabetes mellitus-the patient continues to lose weight he lost 2 pounds since his last visit.  As mentioned he did have gastric sleeve surgery and this is being followed by his general surgeon.  I instructed the patient on the importance of restricting his carbohydrates both for his weight and also for his diabetes mellitus.  And I spoke to him also about the importance of exercise.\par 11/8/21\par This is a 61-year-old gentleman with a history of bladder carcinoma, diabetes mellitus, morbid obesity for which he had laparoscopic sleeve surgery, and hypertension. The patient was recently evaluated by his urologist via CAT scan which failed to reveal any evidence of recurrence or retroperitoneal adenopathy. In addition the patient continues to lose weight and has decreased his carbohydrate intake. He feels well and denies any chest pains or shortness of breath. His blood pressure was well controlled his physical examination is positive for obesity bloods and an A1c were obtained\par This is a 61-year-old gentleman with a history of bladder carcinoma, elevated BMI to 39, diabetes mellitus, gastroparesis, hypertension who is here today for a follow-up visit positive the patient feels well and does not offer any complaints.  He is being followed by his urologist for his bladder carcinoma.  He is also up-to-date with all his immunizations.\par 9/29/22\par This is a 62-year-old gentleman with a history of bladder carcinoma which is being followed by urology, type 2 diabetes mellitus for which she has been treated with oral hypoglycemic agents and in addition has a history of morbid obesity and is status post bariatric surgery.  He presently denies any complaints he denies having any chest pains or shortness of breath.  His physical examination is positive for obesity.  The high-dose flu vaccine was given because of his excessive risk factors.  And bloods were obtained

## 2022-09-29 NOTE — PHYSICAL EXAM
[Soft] : abdomen soft [No HSM] : no HSM [Normal] : normal gait, coordination grossly intact, no focal deficits and deep tendon reflexes were 2+ and symmetric [de-identified] : Furuncle left upper quadrant

## 2022-09-29 NOTE — HISTORY OF PRESENT ILLNESS
[de-identified] : This is a 62 year-old patient with a history of bladder carcinoma, diabetes mellitus, hypertension, morbid obesity, status post laparoscopic sleeve surgery and sleep apnea

## 2022-09-30 DIAGNOSIS — N39.0 URINARY TRACT INFECTION, SITE NOT SPECIFIED: ICD-10-CM

## 2022-09-30 DIAGNOSIS — A49.9 URINARY TRACT INFECTION, SITE NOT SPECIFIED: ICD-10-CM

## 2022-09-30 LAB
ALBUMIN SERPL ELPH-MCNC: 4.2 G/DL
ALP BLD-CCNC: 94 U/L
ALT SERPL-CCNC: 18 U/L
ANION GAP SERPL CALC-SCNC: 13 MMOL/L
APPEARANCE: ABNORMAL
AST SERPL-CCNC: 12 U/L
BACTERIA: ABNORMAL
BASOPHILS # BLD AUTO: 0.09 K/UL
BASOPHILS NFR BLD AUTO: 0.8 %
BILIRUB SERPL-MCNC: 0.6 MG/DL
BILIRUBIN URINE: NEGATIVE
BLOOD URINE: ABNORMAL
BUN SERPL-MCNC: 20 MG/DL
CALCIUM SERPL-MCNC: 9.6 MG/DL
CHLORIDE SERPL-SCNC: 102 MMOL/L
CHOLEST SERPL-MCNC: 148 MG/DL
CO2 SERPL-SCNC: 27 MMOL/L
COLOR: YELLOW
CREAT SERPL-MCNC: 0.94 MG/DL
EGFR: 92 ML/MIN/1.73M2
EOSINOPHIL # BLD AUTO: 0.52 K/UL
EOSINOPHIL NFR BLD AUTO: 4.7 %
ESTIMATED AVERAGE GLUCOSE: 126 MG/DL
FERRITIN SERPL-MCNC: 87 NG/ML
FOLATE SERPL-MCNC: >20 NG/ML
GLUCOSE QUALITATIVE U: NEGATIVE
GLUCOSE SERPL-MCNC: 120 MG/DL
HBA1C MFR BLD HPLC: 6 %
HCT VFR BLD CALC: 39.4 %
HDLC SERPL-MCNC: 49 MG/DL
HGB BLD-MCNC: 13.7 G/DL
HYALINE CASTS: 2 /LPF
IMM GRANULOCYTES NFR BLD AUTO: 0.3 %
KETONES URINE: NEGATIVE
LDLC SERPL CALC-MCNC: 78 MG/DL
LEUKOCYTE ESTERASE URINE: ABNORMAL
LYMPHOCYTES # BLD AUTO: 1.3 K/UL
LYMPHOCYTES NFR BLD AUTO: 11.8 %
MAN DIFF?: NORMAL
MCHC RBC-ENTMCNC: 29.4 PG
MCHC RBC-ENTMCNC: 34.8 GM/DL
MCV RBC AUTO: 84.5 FL
MICROSCOPIC-UA: NORMAL
MONOCYTES # BLD AUTO: 1 K/UL
MONOCYTES NFR BLD AUTO: 9.1 %
NEUTROPHILS # BLD AUTO: 8.05 K/UL
NEUTROPHILS NFR BLD AUTO: 73.3 %
NITRITE URINE: NEGATIVE
NONHDLC SERPL-MCNC: 98 MG/DL
PH URINE: 6
PLATELET # BLD AUTO: 220 K/UL
POTASSIUM SERPL-SCNC: 3.5 MMOL/L
PROT SERPL-MCNC: 6.5 G/DL
PROTEIN URINE: ABNORMAL
RBC # BLD: 4.66 M/UL
RBC # FLD: 14.1 %
RED BLOOD CELLS URINE: 1 /HPF
SODIUM SERPL-SCNC: 141 MMOL/L
SPECIFIC GRAVITY URINE: 1.02
SQUAMOUS EPITHELIAL CELLS: 1 /HPF
T3RU NFR SERPL: 1 TBI
T4 SERPL-MCNC: 7.5 UG/DL
TRIGL SERPL-MCNC: 104 MG/DL
TSH SERPL-ACNC: 0.8 UIU/ML
URATE SERPL-MCNC: 5.3 MG/DL
UROBILINOGEN URINE: NORMAL
VIT B12 SERPL-MCNC: 1043 PG/ML
WBC # FLD AUTO: 10.99 K/UL
WHITE BLOOD CELLS URINE: 191 /HPF

## 2022-10-03 LAB — BACTERIA UR CULT: ABNORMAL

## 2022-10-21 LAB
APPEARANCE: CLEAR
BACTERIA: NEGATIVE
BILIRUBIN URINE: NEGATIVE
BLOOD URINE: NEGATIVE
COLOR: YELLOW
GLUCOSE QUALITATIVE U: NEGATIVE
HYALINE CASTS: 1 /LPF
KETONES URINE: NEGATIVE
LEUKOCYTE ESTERASE URINE: NEGATIVE
MICROSCOPIC-UA: NORMAL
NITRITE URINE: NEGATIVE
PH URINE: 6
PROTEIN URINE: ABNORMAL
RED BLOOD CELLS URINE: 2 /HPF
SPECIFIC GRAVITY URINE: 1.03
SQUAMOUS EPITHELIAL CELLS: 1 /HPF
UROBILINOGEN URINE: NORMAL
WHITE BLOOD CELLS URINE: 1 /HPF

## 2022-10-23 LAB — BACTERIA UR CULT: NORMAL

## 2022-12-07 NOTE — PATIENT PROFILE ADULT. - AS SC BRADEN SENSORY
TREATMENT PLAN (Medication Management Only)        [facility]    Name and Date of Birth:  Jason Retana 76 y o  1954  Date of Treatment Plan: December 7, 2022  Diagnosis/Diagnoses:    1  Paranoid schizophrenia, chronic condition [facility])      Strengths/Personal Resources for Self-Care: supportive family, supportive friends, taking medications as prescribed  Area/Areas of need (in own words): hallucinations, paranoid thoughts  1  Long Term Goal: maintain psychotic symptoms  Target Date:6 months - 6/7/2023  Person/Persons responsible for completion of goal: Toña Ayon  2  Short Term Objective (s) - How will we reach this goal?:   A  Provider new recommended medication/dosage changes and/or continue medication(s): continue current medications as prescribed Trazodone, Seroquel, Klonopin  B  N/A   C  N/A  Target Date:6 months - 6/7/2023  Person/Persons Responsible for Completion of Goal: Toña Ayon, wife  Progress Towards Goals: stable  Treatment Modality: medication management every 4 months  Review due 180 days from date of this plan: 6 months - 6/7/2023  Expected length of service: maintenance  My Physician/PA/NP and I have developed this plan together and I agree to work on the goals and objectives  I understand the treatment goals that were developed for my treatment  (4) no impairment

## 2022-12-20 ENCOUNTER — RX RENEWAL (OUTPATIENT)
Age: 62
End: 2022-12-20

## 2023-01-01 NOTE — ED ADULT NURSE NOTE - CHIEF COMPLAINT QUOTE

## 2023-01-26 ENCOUNTER — LABORATORY RESULT (OUTPATIENT)
Age: 63
End: 2023-01-26

## 2023-01-26 ENCOUNTER — APPOINTMENT (OUTPATIENT)
Dept: INTERNAL MEDICINE | Facility: CLINIC | Age: 63
End: 2023-01-26
Payer: MEDICARE

## 2023-01-26 VITALS — DIASTOLIC BLOOD PRESSURE: 70 MMHG | SYSTOLIC BLOOD PRESSURE: 140 MMHG

## 2023-01-26 VITALS — BODY MASS INDEX: 40.3 KG/M2 | WEIGHT: 219 LBS | HEIGHT: 62 IN

## 2023-01-26 PROCEDURE — 99214 OFFICE O/P EST MOD 30 MIN: CPT | Mod: 25

## 2023-01-26 PROCEDURE — 36415 COLL VENOUS BLD VENIPUNCTURE: CPT | Mod: 59

## 2023-01-26 NOTE — HISTORY OF PRESENT ILLNESS
[de-identified] : This is a 62-year-old gentleman with a history of type 2 diabetes mellitus, bladder carcinoma, bariatric surgery for being overweight and a history of hypertension who is here today for a follow-up visit.

## 2023-01-26 NOTE — PHYSICAL EXAM
[Soft] : abdomen soft [No HSM] : no HSM [Normal] : normal gait, coordination grossly intact, no focal deficits and deep tendon reflexes were 2+ and symmetric [de-identified] : Furuncle left upper quadrant

## 2023-01-26 NOTE — ASSESSMENT
[FreeTextEntry1] : Problems\par Type 2 diabetes mellitus\par Bladder carcinoma\par Hypertension\par Obesity\par Assessment\par Patient has not lost any weight since his last visit I spoke to the patient at length about the importance of exercise and weight loss.  In addition he is being followed by his urologist for his bladder carcinoma.  His blood pressure was normal but not at goal today.  And this is most likely related to his obesity.  I spoke to the patient about maintaining a low-sodium diet.

## 2023-01-27 LAB
ALBUMIN SERPL ELPH-MCNC: 4.4 G/DL
ALP BLD-CCNC: 101 U/L
ALT SERPL-CCNC: 17 U/L
ANION GAP SERPL CALC-SCNC: 10 MMOL/L
APPEARANCE: CLEAR
AST SERPL-CCNC: 15 U/L
BACTERIA: NEGATIVE
BASOPHILS # BLD AUTO: 0.06 K/UL
BASOPHILS NFR BLD AUTO: 0.7 %
BILIRUB SERPL-MCNC: 0.6 MG/DL
BILIRUBIN URINE: NEGATIVE
BLOOD URINE: NEGATIVE
BUN SERPL-MCNC: 19 MG/DL
CALCIUM SERPL-MCNC: 9.9 MG/DL
CHLORIDE SERPL-SCNC: 99 MMOL/L
CHOLEST SERPL-MCNC: 151 MG/DL
CO2 SERPL-SCNC: 32 MMOL/L
COLOR: YELLOW
CREAT SERPL-MCNC: 0.93 MG/DL
EGFR: 93 ML/MIN/1.73M2
EOSINOPHIL # BLD AUTO: 0.43 K/UL
EOSINOPHIL NFR BLD AUTO: 5.1 %
ESTIMATED AVERAGE GLUCOSE: 120 MG/DL
FOLATE SERPL-MCNC: >20 NG/ML
GLUCOSE QUALITATIVE U: NEGATIVE
GLUCOSE SERPL-MCNC: 122 MG/DL
HBA1C MFR BLD HPLC: 5.8 %
HCT VFR BLD CALC: 43.1 %
HDLC SERPL-MCNC: 49 MG/DL
HGB BLD-MCNC: 14.7 G/DL
HYALINE CASTS: 0 /LPF
IMM GRANULOCYTES NFR BLD AUTO: 0.2 %
KETONES URINE: NEGATIVE
LDLC SERPL CALC-MCNC: 75 MG/DL
LEUKOCYTE ESTERASE URINE: NEGATIVE
LYMPHOCYTES # BLD AUTO: 1.68 K/UL
LYMPHOCYTES NFR BLD AUTO: 19.8 %
MAN DIFF?: NORMAL
MCHC RBC-ENTMCNC: 30.1 PG
MCHC RBC-ENTMCNC: 34.1 GM/DL
MCV RBC AUTO: 88.3 FL
MICROSCOPIC-UA: NORMAL
MONOCYTES # BLD AUTO: 0.98 K/UL
MONOCYTES NFR BLD AUTO: 11.6 %
NEUTROPHILS # BLD AUTO: 5.3 K/UL
NEUTROPHILS NFR BLD AUTO: 62.6 %
NITRITE URINE: NEGATIVE
NONHDLC SERPL-MCNC: 102 MG/DL
PH URINE: 6.5
PLATELET # BLD AUTO: 240 K/UL
POTASSIUM SERPL-SCNC: 4.2 MMOL/L
PROT SERPL-MCNC: 6.8 G/DL
PROTEIN URINE: ABNORMAL
PSA SERPL-MCNC: 1 NG/ML
RBC # BLD: 4.88 M/UL
RBC # FLD: 14.3 %
RED BLOOD CELLS URINE: 1 /HPF
SODIUM SERPL-SCNC: 141 MMOL/L
SPECIFIC GRAVITY URINE: 1.02
SQUAMOUS EPITHELIAL CELLS: 1 /HPF
T3RU NFR SERPL: 1 TBI
T4 SERPL-MCNC: 8.1 UG/DL
TRIGL SERPL-MCNC: 137 MG/DL
TSH SERPL-ACNC: 1.03 UIU/ML
URATE SERPL-MCNC: 4.6 MG/DL
UROBILINOGEN URINE: NORMAL
VIT B12 SERPL-MCNC: 1874 PG/ML
WBC # FLD AUTO: 8.47 K/UL
WHITE BLOOD CELLS URINE: 1 /HPF

## 2023-01-28 LAB — BACTERIA UR CULT: NORMAL

## 2023-02-09 ENCOUNTER — APPOINTMENT (OUTPATIENT)
Dept: BARIATRICS | Facility: CLINIC | Age: 63
End: 2023-02-09
Payer: MEDICARE

## 2023-02-09 ENCOUNTER — NON-APPOINTMENT (OUTPATIENT)
Age: 63
End: 2023-02-09

## 2023-02-09 VITALS
HEIGHT: 62 IN | OXYGEN SATURATION: 99 % | WEIGHT: 215.17 LBS | TEMPERATURE: 97 F | HEART RATE: 69 BPM | SYSTOLIC BLOOD PRESSURE: 134 MMHG | DIASTOLIC BLOOD PRESSURE: 78 MMHG | BODY MASS INDEX: 39.6 KG/M2

## 2023-02-09 DIAGNOSIS — R63.5 ABNORMAL WEIGHT GAIN: ICD-10-CM

## 2023-02-09 PROCEDURE — 99213 OFFICE O/P EST LOW 20 MIN: CPT

## 2023-02-09 RX ORDER — DOXYCYCLINE HYCLATE 100 MG/1
100 CAPSULE ORAL
Qty: 14 | Refills: 0 | Status: DISCONTINUED | COMMUNITY
Start: 2022-05-29 | End: 2023-02-09

## 2023-02-09 RX ORDER — HYDROCORTISONE 25 MG/G
2.5 CREAM TOPICAL 3 TIMES DAILY
Qty: 1 | Refills: 2 | Status: DISCONTINUED | COMMUNITY
Start: 2021-04-06 | End: 2023-02-09

## 2023-02-09 RX ORDER — DOXYCYCLINE HYCLATE 100 MG/1
100 CAPSULE ORAL
Qty: 14 | Refills: 0 | Status: DISCONTINUED | COMMUNITY
Start: 2022-09-30 | End: 2023-02-09

## 2023-02-09 NOTE — ASSESSMENT
[FreeTextEntry1] : Routine annual follow-up status post laparoscopic sleeve gastrectomy in 2017.  Patient presents today with approximately 9 pounds weight regain which she contributes to increased sedentary behaviors in the mourning of the passing of his sister.  Denies any adverse events or symptoms.\par Recent labs ordered by his PMD have been reviewed and findings discussed.  Pt concerned about his HgbA1C which has remained stable in the 5.6-5.8 range.\par \par Nutritional and weight loss counseling has been provided. The importance of weight reduction in the treatment of Obesity and its contribution to resolution of obesity related comorbidities has been discussed.  The patient is encouraged to remain calorie conscious and continue a low fat, low carbohydrate, high protein diet. Eat slowly and chew food well.  Avoid eating and drinking simultaneously.  Also, emphasis has been placed on the importance of adequate hydration, multi-vitamin supplementation and exercise.  (15 min)\par \par f/u annually.\par

## 2023-02-09 NOTE — PHYSICAL EXAM
[Obese, well nourished, in no acute distress] : obese, well nourished, in no acute distress [Normal] : affect appropriate [de-identified] : Lower extremity edema persists but improved. [de-identified] : Obese, soft, nontender, nondistended, positive bowel sounds in all four quadrants.  No hernia or masses. [de-identified] : Ambulating without difficulty or assistance

## 2023-02-09 NOTE — HISTORY OF PRESENT ILLNESS
[de-identified] : 5 yrs s/p Lap Sleeve Gastrectomy [Procedure: ___] : Procedure performed: [unfilled]  [Date of Surgery: ___] : Date of Surgery:   [unfilled] [Surgeon Name:   ___] : Surgeon Name: Dr. BRAGA

## 2023-05-02 ENCOUNTER — LABORATORY RESULT (OUTPATIENT)
Age: 63
End: 2023-05-02

## 2023-05-02 ENCOUNTER — APPOINTMENT (OUTPATIENT)
Dept: INTERNAL MEDICINE | Facility: CLINIC | Age: 63
End: 2023-05-02
Payer: MEDICARE

## 2023-05-02 ENCOUNTER — NON-APPOINTMENT (OUTPATIENT)
Age: 63
End: 2023-05-02

## 2023-05-02 VITALS — DIASTOLIC BLOOD PRESSURE: 78 MMHG | SYSTOLIC BLOOD PRESSURE: 138 MMHG

## 2023-05-02 PROCEDURE — 36415 COLL VENOUS BLD VENIPUNCTURE: CPT

## 2023-05-02 PROCEDURE — 93000 ELECTROCARDIOGRAM COMPLETE: CPT | Mod: 59

## 2023-05-02 PROCEDURE — 99214 OFFICE O/P EST MOD 30 MIN: CPT | Mod: 25

## 2023-05-02 NOTE — PHYSICAL EXAM
[Soft] : abdomen soft [No HSM] : no HSM [Normal] : normal gait, coordination grossly intact, no focal deficits and deep tendon reflexes were 2+ and symmetric [de-identified] : Furuncle left upper quadrant

## 2023-05-02 NOTE — HISTORY OF PRESENT ILLNESS
[de-identified] : This is a 62-year-old gentleman with a history of type 2 diabetes mellitus, bladder carcinoma, bariatric surgery for being overweight and a history of hypertension who is here today for a follow-up visit.

## 2023-05-02 NOTE — ASSESSMENT
[FreeTextEntry1] : Problems\par Type 2 diabetes mellitus\par Bladder carcinoma\par Hypertension\par Obesity\par Assessment\par Patient has not lost any weight since his last visit I spoke to the patient at length about the importance of exercise and weight loss.  In addition he is being followed by his urologist for his bladder carcinoma.  His blood pressure was normal but not at goal today.  And this is most likely related to his obesity.  I spoke to the patient about maintaining a low-sodium diet.  He is also due for CT of the chest noncontrast

## 2023-05-03 LAB
ALBUMIN SERPL ELPH-MCNC: 4.2 G/DL
ALP BLD-CCNC: 97 U/L
ALT SERPL-CCNC: 23 U/L
ANION GAP SERPL CALC-SCNC: 12 MMOL/L
APPEARANCE: CLEAR
AST SERPL-CCNC: 17 U/L
BACTERIA: NEGATIVE /HPF
BASOPHILS # BLD AUTO: 0.06 K/UL
BASOPHILS NFR BLD AUTO: 0.5 %
BILIRUB SERPL-MCNC: 0.6 MG/DL
BILIRUBIN URINE: ABNORMAL
BLOOD URINE: NEGATIVE
BUN SERPL-MCNC: 21 MG/DL
CALCIUM SERPL-MCNC: 9.8 MG/DL
CAST: 3 /LPF
CHLORIDE SERPL-SCNC: 99 MMOL/L
CHOLEST SERPL-MCNC: 154 MG/DL
CO2 SERPL-SCNC: 27 MMOL/L
COLOR: NORMAL
CREAT SERPL-MCNC: 1.16 MG/DL
EGFR: 71 ML/MIN/1.73M2
EOSINOPHIL # BLD AUTO: 0.42 K/UL
EOSINOPHIL NFR BLD AUTO: 3.6 %
EPITHELIAL CELLS: 4 /HPF
ESTIMATED AVERAGE GLUCOSE: 131 MG/DL
FERRITIN SERPL-MCNC: 128 NG/ML
FOLATE SERPL-MCNC: >20 NG/ML
GLUCOSE QUALITATIVE U: NEGATIVE MG/DL
GLUCOSE SERPL-MCNC: 105 MG/DL
HBA1C MFR BLD HPLC: 6.2 %
HCT VFR BLD CALC: 40.8 %
HDLC SERPL-MCNC: 49 MG/DL
HGB BLD-MCNC: 14 G/DL
IMM GRANULOCYTES NFR BLD AUTO: 0.3 %
KETONES URINE: NEGATIVE MG/DL
LDLC SERPL CALC-MCNC: 73 MG/DL
LEUKOCYTE ESTERASE URINE: ABNORMAL
LYMPHOCYTES # BLD AUTO: 1.51 K/UL
LYMPHOCYTES NFR BLD AUTO: 12.9 %
MAN DIFF?: NORMAL
MCHC RBC-ENTMCNC: 30.2 PG
MCHC RBC-ENTMCNC: 34.3 GM/DL
MCV RBC AUTO: 87.9 FL
MICROSCOPIC-UA: NORMAL
MONOCYTES # BLD AUTO: 1.17 K/UL
MONOCYTES NFR BLD AUTO: 10 %
NEUTROPHILS # BLD AUTO: 8.53 K/UL
NEUTROPHILS NFR BLD AUTO: 72.7 %
NITRITE URINE: NEGATIVE
NONHDLC SERPL-MCNC: 104 MG/DL
PH URINE: 5.5
PLATELET # BLD AUTO: 223 K/UL
POTASSIUM SERPL-SCNC: 3.8 MMOL/L
PROT SERPL-MCNC: 6.5 G/DL
PROTEIN URINE: 100 MG/DL
RBC # BLD: 4.64 M/UL
RBC # FLD: 13.9 %
RED BLOOD CELLS URINE: 1 /HPF
SODIUM SERPL-SCNC: 138 MMOL/L
SPECIFIC GRAVITY URINE: 1.03
T3RU NFR SERPL: 1 TBI
T4 SERPL-MCNC: 7.8 UG/DL
TRIGL SERPL-MCNC: 154 MG/DL
TSH SERPL-ACNC: 1.04 UIU/ML
URATE SERPL-MCNC: 7 MG/DL
UROBILINOGEN URINE: 0.2 MG/DL
VIT B12 SERPL-MCNC: >2000 PG/ML
WBC # FLD AUTO: 11.72 K/UL
WHITE BLOOD CELLS URINE: 6 /HPF

## 2023-05-22 ENCOUNTER — NON-APPOINTMENT (OUTPATIENT)
Age: 63
End: 2023-05-22

## 2023-06-13 ENCOUNTER — RX RENEWAL (OUTPATIENT)
Age: 63
End: 2023-06-13

## 2023-09-21 ENCOUNTER — LABORATORY RESULT (OUTPATIENT)
Age: 63
End: 2023-09-21

## 2023-09-21 ENCOUNTER — APPOINTMENT (OUTPATIENT)
Dept: INTERNAL MEDICINE | Facility: CLINIC | Age: 63
End: 2023-09-21
Payer: MEDICARE

## 2023-09-21 VITALS — WEIGHT: 210 LBS | BODY MASS INDEX: 38.64 KG/M2 | HEIGHT: 62 IN

## 2023-09-21 VITALS — DIASTOLIC BLOOD PRESSURE: 100 MMHG | SYSTOLIC BLOOD PRESSURE: 160 MMHG

## 2023-09-21 DIAGNOSIS — R91.1 SOLITARY PULMONARY NODULE: ICD-10-CM

## 2023-09-21 PROCEDURE — 99214 OFFICE O/P EST MOD 30 MIN: CPT | Mod: 25

## 2023-09-21 PROCEDURE — 36415 COLL VENOUS BLD VENIPUNCTURE: CPT

## 2023-09-22 ENCOUNTER — APPOINTMENT (OUTPATIENT)
Dept: PULMONOLOGY | Facility: CLINIC | Age: 63
End: 2023-09-22
Payer: MEDICARE

## 2023-09-22 VITALS — DIASTOLIC BLOOD PRESSURE: 79 MMHG | SYSTOLIC BLOOD PRESSURE: 204 MMHG

## 2023-09-22 VITALS
RESPIRATION RATE: 15 BRPM | WEIGHT: 210 LBS | DIASTOLIC BLOOD PRESSURE: 87 MMHG | HEART RATE: 61 BPM | SYSTOLIC BLOOD PRESSURE: 210 MMHG | TEMPERATURE: 97 F | HEIGHT: 62 IN | BODY MASS INDEX: 38.64 KG/M2 | OXYGEN SATURATION: 95 %

## 2023-09-22 LAB
ALBUMIN SERPL ELPH-MCNC: 4.1 G/DL
ALP BLD-CCNC: 96 U/L
ALT SERPL-CCNC: 17 U/L
ANION GAP SERPL CALC-SCNC: 11 MMOL/L
APPEARANCE: CLEAR
AST SERPL-CCNC: 15 U/L
BACTERIA: ABNORMAL /HPF
BASOPHILS # BLD AUTO: 0.06 K/UL
BASOPHILS NFR BLD AUTO: 0.7 %
BILIRUB SERPL-MCNC: 0.6 MG/DL
BILIRUBIN URINE: NEGATIVE
BLOOD URINE: NEGATIVE
BUN SERPL-MCNC: 23 MG/DL
CALCIUM SERPL-MCNC: 9.7 MG/DL
CAST: 7 /LPF
CHLORIDE SERPL-SCNC: 102 MMOL/L
CHOLEST SERPL-MCNC: 127 MG/DL
CO2 SERPL-SCNC: 27 MMOL/L
COLOR: YELLOW
CREAT SERPL-MCNC: 1.1 MG/DL
EGFR: 75 ML/MIN/1.73M2
EOSINOPHIL # BLD AUTO: 0.4 K/UL
EOSINOPHIL NFR BLD AUTO: 4.4 %
EPITHELIAL CELLS: 2 /HPF
ESTIMATED AVERAGE GLUCOSE: 126 MG/DL
GLUCOSE QUALITATIVE U: NEGATIVE MG/DL
GLUCOSE SERPL-MCNC: 116 MG/DL
HBA1C MFR BLD HPLC: 6 %
HCT VFR BLD CALC: 38.1 %
HDLC SERPL-MCNC: 40 MG/DL
HGB BLD-MCNC: 12.9 G/DL
HYALINE CASTS: PRESENT
IMM GRANULOCYTES NFR BLD AUTO: 0.2 %
KETONES URINE: NEGATIVE MG/DL
LDLC SERPL CALC-MCNC: 63 MG/DL
LEUKOCYTE ESTERASE URINE: ABNORMAL
LYMPHOCYTES # BLD AUTO: 1.26 K/UL
LYMPHOCYTES NFR BLD AUTO: 13.9 %
MAN DIFF?: NORMAL
MCHC RBC-ENTMCNC: 29.4 PG
MCHC RBC-ENTMCNC: 33.9 GM/DL
MCV RBC AUTO: 86.8 FL
MICROSCOPIC-UA: NORMAL
MONOCYTES # BLD AUTO: 0.81 K/UL
MONOCYTES NFR BLD AUTO: 8.9 %
NEUTROPHILS # BLD AUTO: 6.52 K/UL
NEUTROPHILS NFR BLD AUTO: 71.9 %
NITRITE URINE: NEGATIVE
NONHDLC SERPL-MCNC: 88 MG/DL
PH URINE: 5.5
PLATELET # BLD AUTO: 223 K/UL
POTASSIUM SERPL-SCNC: 4.1 MMOL/L
PROT SERPL-MCNC: 6.5 G/DL
PROTEIN URINE: 100 MG/DL
RBC # BLD: 4.39 M/UL
RBC # FLD: 14.3 %
RED BLOOD CELLS URINE: 0 /HPF
REVIEW: NORMAL
SODIUM SERPL-SCNC: 140 MMOL/L
SPECIFIC GRAVITY URINE: 1.02
T3RU NFR SERPL: 1.1 TBI
T4 SERPL-MCNC: 7.7 UG/DL
TRIGL SERPL-MCNC: 144 MG/DL
TSH SERPL-ACNC: 0.26 UIU/ML
URATE SERPL-MCNC: 6.1 MG/DL
UROBILINOGEN URINE: 0.2 MG/DL
WBC # FLD AUTO: 9.07 K/UL
WBC CLUMPS: PRESENT
WHITE BLOOD CELLS URINE: 115 /HPF

## 2023-09-22 PROCEDURE — 99214 OFFICE O/P EST MOD 30 MIN: CPT

## 2023-10-06 NOTE — PATIENT PROFILE ADULT. - CAREGIVER NAME
Patient calling for refill of oxycodone.     Date of last fill: 9/27/2023  Surgery: 5/18/2023  Time elapsed since last surgery: 16 weeks  Date of next follow up appointment: 12/11/2023    Pt notified response time for refills is 24-48 hours amaya elena

## 2023-10-27 ENCOUNTER — LABORATORY RESULT (OUTPATIENT)
Age: 63
End: 2023-10-27

## 2023-10-27 ENCOUNTER — APPOINTMENT (OUTPATIENT)
Dept: INTERNAL MEDICINE | Facility: CLINIC | Age: 63
End: 2023-10-27
Payer: MEDICARE

## 2023-10-27 VITALS — BODY MASS INDEX: 38.84 KG/M2 | WEIGHT: 211.04 LBS | HEIGHT: 62 IN

## 2023-10-27 VITALS — DIASTOLIC BLOOD PRESSURE: 90 MMHG | SYSTOLIC BLOOD PRESSURE: 160 MMHG

## 2023-10-27 PROCEDURE — 99214 OFFICE O/P EST MOD 30 MIN: CPT | Mod: 25

## 2023-10-27 PROCEDURE — 36415 COLL VENOUS BLD VENIPUNCTURE: CPT

## 2023-10-27 RX ORDER — HYDROCHLOROTHIAZIDE 12.5 MG/1
12.5 TABLET ORAL
Qty: 90 | Refills: 3 | Status: DISCONTINUED | COMMUNITY
Start: 2019-10-29 | End: 2023-10-27

## 2023-10-27 RX ORDER — LOSARTAN POTASSIUM 100 MG/1
100 TABLET, FILM COATED ORAL DAILY
Refills: 0 | Status: DISCONTINUED | COMMUNITY
Start: 2019-10-29 | End: 2023-10-27

## 2023-10-30 LAB
ALBUMIN SERPL ELPH-MCNC: 4.5 G/DL
ALP BLD-CCNC: 114 U/L
ALT SERPL-CCNC: 15 U/L
ANION GAP SERPL CALC-SCNC: 12 MMOL/L
APPEARANCE: ABNORMAL
AST SERPL-CCNC: 15 U/L
BACTERIA: ABNORMAL /HPF
BASOPHILS # BLD AUTO: 0.06 K/UL
BASOPHILS NFR BLD AUTO: 0.6 %
BILIRUB SERPL-MCNC: 0.4 MG/DL
BILIRUBIN URINE: NEGATIVE
BLOOD URINE: ABNORMAL
BUN SERPL-MCNC: 29 MG/DL
CALCIUM SERPL-MCNC: 10.1 MG/DL
CAST: 1 /LPF
CHLORIDE SERPL-SCNC: 100 MMOL/L
CHOLEST SERPL-MCNC: 166 MG/DL
CO2 SERPL-SCNC: 28 MMOL/L
COLOR: YELLOW
CREAT SERPL-MCNC: 1.1 MG/DL
EGFR: 75 ML/MIN/1.73M2
EOSINOPHIL # BLD AUTO: 0.46 K/UL
EOSINOPHIL NFR BLD AUTO: 4.5 %
EPITHELIAL CELLS: 1 /HPF
ESTIMATED AVERAGE GLUCOSE: 120 MG/DL
FOLATE SERPL-MCNC: >20 NG/ML
GLUCOSE QUALITATIVE U: NEGATIVE MG/DL
GLUCOSE SERPL-MCNC: 111 MG/DL
HBA1C MFR BLD HPLC: 5.8 %
HCT VFR BLD CALC: 40.3 %
HDLC SERPL-MCNC: 52 MG/DL
HGB BLD-MCNC: 14.3 G/DL
IMM GRANULOCYTES NFR BLD AUTO: 0.4 %
KETONES URINE: NEGATIVE MG/DL
LDLC SERPL CALC-MCNC: 97 MG/DL
LEUKOCYTE ESTERASE URINE: ABNORMAL
LYMPHOCYTES # BLD AUTO: 1.57 K/UL
LYMPHOCYTES NFR BLD AUTO: 15.3 %
MAN DIFF?: NORMAL
MCHC RBC-ENTMCNC: 31.1 PG
MCHC RBC-ENTMCNC: 35.5 GM/DL
MCV RBC AUTO: 87.6 FL
MICROSCOPIC-UA: NORMAL
MONOCYTES # BLD AUTO: 1.14 K/UL
MONOCYTES NFR BLD AUTO: 11.1 %
NEUTROPHILS # BLD AUTO: 6.99 K/UL
NEUTROPHILS NFR BLD AUTO: 68.1 %
NITRITE URINE: POSITIVE
NONHDLC SERPL-MCNC: 114 MG/DL
PH URINE: 5.5
PLATELET # BLD AUTO: 252 K/UL
POTASSIUM SERPL-SCNC: 4.2 MMOL/L
PROT SERPL-MCNC: 7 G/DL
PROTEIN URINE: 100 MG/DL
PSA SERPL-MCNC: 1.39 NG/ML
RBC # BLD: 4.6 M/UL
RBC # FLD: 14.1 %
RED BLOOD CELLS URINE: 0 /HPF
SODIUM SERPL-SCNC: 140 MMOL/L
SPECIFIC GRAVITY URINE: 1.02
T3RU NFR SERPL: 1 TBI
T4 SERPL-MCNC: 7.8 UG/DL
TRIGL SERPL-MCNC: 89 MG/DL
TSH SERPL-ACNC: 0.9 UIU/ML
URATE SERPL-MCNC: 5.5 MG/DL
UROBILINOGEN URINE: 0.2 MG/DL
VIT B12 SERPL-MCNC: >2000 PG/ML
WBC # FLD AUTO: 10.26 K/UL
WHITE BLOOD CELLS URINE: 211 /HPF

## 2023-11-03 ENCOUNTER — APPOINTMENT (OUTPATIENT)
Dept: INTERNAL MEDICINE | Facility: CLINIC | Age: 63
End: 2023-11-03
Payer: MEDICARE

## 2023-11-03 VITALS — DIASTOLIC BLOOD PRESSURE: 80 MMHG | SYSTOLIC BLOOD PRESSURE: 140 MMHG

## 2023-11-03 DIAGNOSIS — A49.9 URINARY TRACT INFECTION, SITE NOT SPECIFIED: ICD-10-CM

## 2023-11-03 DIAGNOSIS — N39.0 URINARY TRACT INFECTION, SITE NOT SPECIFIED: ICD-10-CM

## 2023-11-03 PROCEDURE — 90686 IIV4 VACC NO PRSV 0.5 ML IM: CPT

## 2023-11-03 PROCEDURE — G0008: CPT

## 2023-11-03 PROCEDURE — 99213 OFFICE O/P EST LOW 20 MIN: CPT | Mod: 25

## 2023-12-08 ENCOUNTER — RX RENEWAL (OUTPATIENT)
Age: 63
End: 2023-12-08

## 2024-02-06 ENCOUNTER — LABORATORY RESULT (OUTPATIENT)
Age: 64
End: 2024-02-06

## 2024-02-06 ENCOUNTER — APPOINTMENT (OUTPATIENT)
Dept: INTERNAL MEDICINE | Facility: CLINIC | Age: 64
End: 2024-02-06
Payer: MEDICARE

## 2024-02-06 VITALS — HEIGHT: 62 IN | WEIGHT: 202.25 LBS | BODY MASS INDEX: 37.22 KG/M2

## 2024-02-06 VITALS — SYSTOLIC BLOOD PRESSURE: 130 MMHG | DIASTOLIC BLOOD PRESSURE: 78 MMHG

## 2024-02-06 DIAGNOSIS — R91.8 OTHER NONSPECIFIC ABNORMAL FINDING OF LUNG FIELD: ICD-10-CM

## 2024-02-06 PROCEDURE — G2211 COMPLEX E/M VISIT ADD ON: CPT

## 2024-02-06 PROCEDURE — 36415 COLL VENOUS BLD VENIPUNCTURE: CPT

## 2024-02-06 PROCEDURE — 99213 OFFICE O/P EST LOW 20 MIN: CPT

## 2024-02-06 NOTE — ASSESSMENT
[FreeTextEntry1] : Problems Hypertension-the patient's blood pressure is very well-controlled on Hyzaar 100/25, amlodipine 2-1/2 mg a day atenolol 50 mg twice a day.  As these medications can affect renal and electrolyte function bloods were obtained to check his sodium potassium BUN/creatinine Diabetes mellitus-bloods were obtained to check his hemoglobin A1c Pulmonary nodules-I reviewed his previous CT of the chest which was performed at the end of March and revealed groundglass and small pulmonary nodules.  He is due for repeat CAT scan at the end of April.

## 2024-02-06 NOTE — HISTORY OF PRESENT ILLNESS
[de-identified] : This is a 63-year-old patient with a history of morbid obesity who is status post bariatric surgery.  In addition he has a history of significant hypertension, pulmonary nodules, and diabetes mellitus he is here today for follow-up visit

## 2024-02-08 ENCOUNTER — APPOINTMENT (OUTPATIENT)
Dept: BARIATRICS | Facility: CLINIC | Age: 64
End: 2024-02-08
Payer: MEDICARE

## 2024-02-08 VITALS
SYSTOLIC BLOOD PRESSURE: 120 MMHG | OXYGEN SATURATION: 98 % | DIASTOLIC BLOOD PRESSURE: 82 MMHG | TEMPERATURE: 97.1 F | HEART RATE: 71 BPM | HEIGHT: 62 IN | WEIGHT: 203 LBS | BODY MASS INDEX: 37.36 KG/M2

## 2024-02-08 DIAGNOSIS — G47.30 SLEEP APNEA, UNSPECIFIED: ICD-10-CM

## 2024-02-08 DIAGNOSIS — E66.01 MORBID (SEVERE) OBESITY DUE TO EXCESS CALORIES: ICD-10-CM

## 2024-02-08 LAB
ALBUMIN SERPL ELPH-MCNC: 4.3 G/DL
ALP BLD-CCNC: 102 U/L
ALT SERPL-CCNC: 16 U/L
ANION GAP SERPL CALC-SCNC: 13 MMOL/L
APPEARANCE: ABNORMAL
AST SERPL-CCNC: 15 U/L
BACTERIA: ABNORMAL /HPF
BASOPHILS # BLD AUTO: 0.08 K/UL
BASOPHILS NFR BLD AUTO: 0.9 %
BILIRUB SERPL-MCNC: 0.5 MG/DL
BILIRUBIN URINE: NEGATIVE
BLOOD URINE: ABNORMAL
BUN SERPL-MCNC: 44 MG/DL
CALCIUM SERPL-MCNC: 9.7 MG/DL
CAST: 2 /LPF
CHLORIDE SERPL-SCNC: 102 MMOL/L
CHOLEST SERPL-MCNC: 127 MG/DL
CO2 SERPL-SCNC: 27 MMOL/L
COLOR: YELLOW
CREAT SERPL-MCNC: 1.47 MG/DL
EGFR: 53 ML/MIN/1.73M2
EOSINOPHIL # BLD AUTO: 0.59 K/UL
EOSINOPHIL NFR BLD AUTO: 6.9 %
EPITHELIAL CELLS: 1 /HPF
ESTIMATED AVERAGE GLUCOSE: 114 MG/DL
FOLATE SERPL-MCNC: >20 NG/ML
GLUCOSE QUALITATIVE U: NEGATIVE MG/DL
GLUCOSE SERPL-MCNC: 120 MG/DL
HBA1C MFR BLD HPLC: 5.6 %
HCT VFR BLD CALC: 38.6 %
HDLC SERPL-MCNC: 39 MG/DL
HGB BLD-MCNC: 13.4 G/DL
IMM GRANULOCYTES NFR BLD AUTO: 0.2 %
KETONES URINE: NEGATIVE MG/DL
LDLC SERPL CALC-MCNC: 63 MG/DL
LEUKOCYTE ESTERASE URINE: ABNORMAL
LYMPHOCYTES # BLD AUTO: 1.27 K/UL
LYMPHOCYTES NFR BLD AUTO: 14.9 %
MAN DIFF?: NORMAL
MCHC RBC-ENTMCNC: 30.3 PG
MCHC RBC-ENTMCNC: 34.7 GM/DL
MCV RBC AUTO: 87.3 FL
MICROSCOPIC-UA: NORMAL
MONOCYTES # BLD AUTO: 1.02 K/UL
MONOCYTES NFR BLD AUTO: 11.9 %
NEUTROPHILS # BLD AUTO: 5.57 K/UL
NEUTROPHILS NFR BLD AUTO: 65.2 %
NITRITE URINE: POSITIVE
NONHDLC SERPL-MCNC: 88 MG/DL
PH URINE: 5.5
PLATELET # BLD AUTO: 250 K/UL
POTASSIUM SERPL-SCNC: 4.3 MMOL/L
PROT SERPL-MCNC: 6.9 G/DL
PROTEIN URINE: 30 MG/DL
PSA SERPL-MCNC: 1.36 NG/ML
RBC # BLD: 4.42 M/UL
RBC # FLD: 14.1 %
RED BLOOD CELLS URINE: 0 /HPF
SODIUM SERPL-SCNC: 142 MMOL/L
SPECIFIC GRAVITY URINE: 1.02
T3RU NFR SERPL: 1 TBI
T4 SERPL-MCNC: 8.8 UG/DL
TRIGL SERPL-MCNC: 149 MG/DL
TSH SERPL-ACNC: 0.23 UIU/ML
URATE SERPL-MCNC: 6.6 MG/DL
UROBILINOGEN URINE: 0.2 MG/DL
VIT B12 SERPL-MCNC: >2000 PG/ML
WBC # FLD AUTO: 8.55 K/UL
WHITE BLOOD CELLS URINE: 214 /HPF

## 2024-02-08 PROCEDURE — 99214 OFFICE O/P EST MOD 30 MIN: CPT

## 2024-02-08 RX ORDER — NITROFURANTOIN (MONOHYDRATE/MACROCRYSTALS) 25; 75 MG/1; MG/1
100 CAPSULE ORAL
Qty: 14 | Refills: 0 | Status: DISCONTINUED | COMMUNITY
Start: 2023-10-30 | End: 2024-02-08

## 2024-02-08 RX ORDER — MULTIVIT-MIN/FOLIC/VIT K/LYCOP 400-300MCG
TABLET ORAL DAILY
Refills: 0 | Status: ACTIVE | COMMUNITY

## 2024-02-08 RX ORDER — IBUPROFEN 200 MG/1
200 TABLET ORAL 4 TIMES DAILY
Refills: 0 | Status: ACTIVE | COMMUNITY

## 2024-02-08 RX ORDER — OMEPRAZOLE 40 MG/1
40 CAPSULE, DELAYED RELEASE ORAL DAILY
Refills: 0 | Status: ACTIVE | COMMUNITY

## 2024-02-08 RX ORDER — CALCIUM CARBONATE 750 MG/1
750 TABLET, CHEWABLE ORAL DAILY
Refills: 0 | Status: ACTIVE | COMMUNITY

## 2024-02-08 RX ORDER — FOLIC ACID 20 MG
CAPSULE ORAL DAILY
Refills: 0 | Status: ACTIVE | COMMUNITY

## 2024-02-08 RX ORDER — LOSARTAN POTASSIUM AND HYDROCHLOROTHIAZIDE 25; 100 MG/1; MG/1
100-25 TABLET ORAL DAILY
Qty: 90 | Refills: 3 | Status: DISCONTINUED | COMMUNITY
Start: 2023-10-27 | End: 2024-02-08

## 2024-02-08 RX ORDER — MELATONIN 3 MG
TABLET ORAL DAILY
Refills: 0 | Status: ACTIVE | COMMUNITY

## 2024-02-08 RX ORDER — PNV NO.95/FERROUS FUM/FOLIC AC 28MG-0.8MG
TABLET ORAL DAILY
Refills: 0 | Status: ACTIVE | COMMUNITY

## 2024-02-08 NOTE — PHYSICAL EXAM
[Obese, well nourished, in no acute distress] : obese, well nourished, in no acute distress [Normal] : affect appropriate [de-identified] : Lower extremity edema persists but improved. [de-identified] : Obese, soft, nontender, nondistended, positive bowel sounds in all four quadrants.  No hernia or masses. [de-identified] : Ambulating without difficulty or assistance

## 2024-02-08 NOTE — HISTORY OF PRESENT ILLNESS
[de-identified] : Routine annual follow up.  No new complaints. [Procedure: ___] : Procedure performed: [unfilled]  [Date of Surgery: ___] : Date of Surgery:   [unfilled] [Surgeon Name:   ___] : Surgeon Name: Dr. BRAGA

## 2024-02-08 NOTE — ASSESSMENT
[FreeTextEntry1] : Routine annual postoperative follow up s/p Lap Sleeve Gastrectomy. Maintaining weight loss appropriately.  Down an additional 8 lbs since previous encounter. Recently seen by PMD and blood work performed.  Results reviewed with the patient.   WBC normal however UA strongly positive for suspected UTI  and Cr also acutely elevated.  Recommend f/u with PMD and or Urologist. On a positive note, HgbA1c is improved and down to 5.6  No adverse events or symptoms related to his sleeve.  Still experienced intermittent reflux but usually only associated with eating too much, too fast, or late at night.  Nutritional and weight loss counseling has been provided. The importance of weight reduction in the treatment of Obesity and its contribution to resolution of obesity related comorbidities has been discussed.  The patient is encouraged to remain calorie conscious and continue a low fat, low carbohydrate, high protein diet. Eat slowly and chew food well.  Avoid eating and drinking simultaneously.  Also, emphasis has been placed on the importance of adequate hydration, multi-vitamin supplementation and exercise.  (15 min)  f/u annually.

## 2024-02-22 DIAGNOSIS — N18.2 CHRONIC KIDNEY DISEASE, STAGE 2 (MILD): ICD-10-CM

## 2024-02-26 RX ORDER — AMLODIPINE BESYLATE 2.5 MG/1
2.5 TABLET ORAL DAILY
Qty: 90 | Refills: 1 | Status: ACTIVE | COMMUNITY
Start: 2023-10-27 | End: 1900-01-01

## 2024-03-04 ENCOUNTER — RX RENEWAL (OUTPATIENT)
Age: 64
End: 2024-03-04

## 2024-03-06 ENCOUNTER — NON-APPOINTMENT (OUTPATIENT)
Age: 64
End: 2024-03-06

## 2024-03-07 ENCOUNTER — APPOINTMENT (OUTPATIENT)
Dept: INTERNAL MEDICINE | Facility: CLINIC | Age: 64
End: 2024-03-07
Payer: MEDICARE

## 2024-03-07 ENCOUNTER — LABORATORY RESULT (OUTPATIENT)
Age: 64
End: 2024-03-07

## 2024-03-07 ENCOUNTER — NON-APPOINTMENT (OUTPATIENT)
Age: 64
End: 2024-03-07

## 2024-03-07 VITALS
WEIGHT: 200 LBS | BODY MASS INDEX: 36.8 KG/M2 | DIASTOLIC BLOOD PRESSURE: 78 MMHG | HEIGHT: 62 IN | SYSTOLIC BLOOD PRESSURE: 130 MMHG | HEART RATE: 74 BPM

## 2024-03-07 DIAGNOSIS — L20.9 ATOPIC DERMATITIS, UNSPECIFIED: ICD-10-CM

## 2024-03-07 DIAGNOSIS — C67.9 MALIGNANT NEOPLASM OF BLADDER, UNSPECIFIED: ICD-10-CM

## 2024-03-07 DIAGNOSIS — Z98.84 BARIATRIC SURGERY STATUS: ICD-10-CM

## 2024-03-07 DIAGNOSIS — I10 ESSENTIAL (PRIMARY) HYPERTENSION: ICD-10-CM

## 2024-03-07 DIAGNOSIS — Z00.00 ENCOUNTER FOR GENERAL ADULT MEDICAL EXAMINATION W/OUT ABNORMAL FINDINGS: ICD-10-CM

## 2024-03-07 PROCEDURE — 93000 ELECTROCARDIOGRAM COMPLETE: CPT

## 2024-03-07 PROCEDURE — G0439: CPT

## 2024-03-07 PROCEDURE — 36415 COLL VENOUS BLD VENIPUNCTURE: CPT

## 2024-03-07 RX ORDER — PREDNISONE 20 MG/1
20 TABLET ORAL
Qty: 5 | Refills: 0 | Status: ACTIVE | COMMUNITY
Start: 2024-03-07 | End: 1900-01-01

## 2024-03-07 NOTE — ASSESSMENT
[FreeTextEntry1] : Problems Sleep apnea Morbid obesity Bladder carcinoma Pulmonary nodule Barometric surgery Assessment The patient's blood pressure and physical examination was normal aside for his weight.  I advised the patient that he is due for a noncontrast CT of the chest to assess his right pulmonary nodule.  In addition bloods were drawn to check his PSA level A1c and cholesterol.  The patient is also due for Prevnar 20 vaccination and shingles vaccine

## 2024-03-07 NOTE — PHYSICAL EXAM
[Normal Voice/Communication] : normal voice/communication [Normal Sclera/Conjunctiva] : normal sclera/conjunctiva [Ill-Appearing] : ill-appearing [PERRL] : pupils equal round and reactive to light [EOMI] : extraocular movements intact [Normal Outer Ear/Nose] : the outer ears and nose were normal in appearance [Normal Oropharynx] : the oropharynx was normal [Supple] : supple [No JVD] : no jugular venous distention [No Lymphadenopathy] : no lymphadenopathy [No Respiratory Distress] : no respiratory distress  [No Accessory Muscle Use] : no accessory muscle use [Clear to Auscultation] : lungs were clear to auscultation bilaterally [Normal Rate] : normal rate  [Normal S1, S2] : normal S1 and S2 [Regular Rhythm] : with a regular rhythm [Soft] : abdomen soft [Non Tender] : non-tender [No HSM] : no HSM [Normal Bowel Sounds] : normal bowel sounds [No Stool to Guaiac] : no stool to guaiac [Normal Sphincter Tone] : normal sphincter tone [Stool Occult Blood] : stool negative for occult blood [Scrotum] : the scrotum was normal [No Prostate Nodules] : no prostate nodules [Testes Mass (___cm)] : there were no testicular masses

## 2024-03-07 NOTE — HISTORY OF PRESENT ILLNESS
[de-identified] : This is a patient with a history of massive obesity who had barometric surgery. He also has a history of  carcinoma and recently had a cystoscopy which was normal. In addition he just underwent a colonoscopy and was found to have 2 benign polyps. He should today for his annual examination

## 2024-03-07 NOTE — HEALTH RISK ASSESSMENT
[No] : No [Never (0 pts)] : Never (0 points) [No falls in past year] : Patient reported no falls in the past year [0] : 2) Feeling down, depressed, or hopeless: Not at all (0) [PHQ-2 Negative - No further assessment needed] : PHQ-2 Negative - No further assessment needed [[id number]] : 0 [Fully functional (bathing, dressing, toileting, transferring, walking, feeding)] : Fully functional (bathing, dressing, toileting, transferring, walking, feeding) [Fully functional (using the telephone, shopping, preparing meals, housekeeping, doing laundry, using] : Fully functional and needs no help or supervision to perform IADLs (using the telephone, shopping, preparing meals, housekeeping, doing laundry, using transportation, managing medications and managing finances) [Never] : Never [Discussed at today's visit] : Advance Directives Discussed at today's visit

## 2024-03-08 DIAGNOSIS — E11.9 TYPE 2 DIABETES MELLITUS W/OUT COMPLICATIONS: ICD-10-CM

## 2024-03-08 LAB
ALBUMIN SERPL ELPH-MCNC: 4.4 G/DL
ALP BLD-CCNC: 103 U/L
ALT SERPL-CCNC: 16 U/L
ANION GAP SERPL CALC-SCNC: 14 MMOL/L
APPEARANCE: ABNORMAL
AST SERPL-CCNC: 14 U/L
BACTERIA: ABNORMAL /HPF
BASOPHILS # BLD AUTO: 0.05 K/UL
BASOPHILS NFR BLD AUTO: 0.5 %
BILIRUB SERPL-MCNC: 0.6 MG/DL
BILIRUBIN URINE: NEGATIVE
BLOOD URINE: ABNORMAL
BUN SERPL-MCNC: 27 MG/DL
CALCIUM OXALATE CRYSTALS: PRESENT
CALCIUM SERPL-MCNC: 10 MG/DL
CAST: 2 /LPF
CHLORIDE SERPL-SCNC: 101 MMOL/L
CHOLEST SERPL-MCNC: 123 MG/DL
CO2 SERPL-SCNC: 26 MMOL/L
COLOR: YELLOW
CREAT SERPL-MCNC: 1.2 MG/DL
EGFR: 68 ML/MIN/1.73M2
EOSINOPHIL # BLD AUTO: 0.2 K/UL
EOSINOPHIL NFR BLD AUTO: 1.9 %
EPITHELIAL CELLS: 1 /HPF
ESTIMATED AVERAGE GLUCOSE: 114 MG/DL
FOLATE SERPL-MCNC: >20 NG/ML
GLUCOSE QUALITATIVE U: NEGATIVE MG/DL
GLUCOSE SERPL-MCNC: 101 MG/DL
HBA1C MFR BLD HPLC: 5.6 %
HCT VFR BLD CALC: 38.1 %
HDLC SERPL-MCNC: 44 MG/DL
HGB BLD-MCNC: 13 G/DL
IMM GRANULOCYTES NFR BLD AUTO: 0.3 %
KETONES URINE: NEGATIVE MG/DL
LDLC SERPL CALC-MCNC: 57 MG/DL
LEUKOCYTE ESTERASE URINE: ABNORMAL
LYMPHOCYTES # BLD AUTO: 0.83 K/UL
LYMPHOCYTES NFR BLD AUTO: 8 %
MAN DIFF?: NORMAL
MCHC RBC-ENTMCNC: 29.5 PG
MCHC RBC-ENTMCNC: 34.1 GM/DL
MCV RBC AUTO: 86.6 FL
MICROSCOPIC-UA: NORMAL
MONOCYTES # BLD AUTO: 0.94 K/UL
MONOCYTES NFR BLD AUTO: 9 %
NEUTROPHILS # BLD AUTO: 8.37 K/UL
NEUTROPHILS NFR BLD AUTO: 80.3 %
NITRITE URINE: NEGATIVE
NONHDLC SERPL-MCNC: 80 MG/DL
PH URINE: 5.5
PLATELET # BLD AUTO: 262 K/UL
POTASSIUM SERPL-SCNC: 3.7 MMOL/L
PROT SERPL-MCNC: 6.9 G/DL
PROTEIN URINE: 300 MG/DL
PSA SERPL-MCNC: 1.63 NG/ML
RBC # BLD: 4.4 M/UL
RBC # FLD: 14.4 %
RED BLOOD CELLS URINE: 0 /HPF
REVIEW: NORMAL
SODIUM SERPL-SCNC: 140 MMOL/L
SPECIFIC GRAVITY URINE: 1.03
T3 SERPL-MCNC: 110 NG/DL
T3RU NFR SERPL: 1 TBI
TRIGL SERPL-MCNC: 129 MG/DL
TSH SERPL-ACNC: 0.47 UIU/ML
URATE SERPL-MCNC: 4.7 MG/DL
UROBILINOGEN URINE: 0.2 MG/DL
VIT B12 SERPL-MCNC: 1986 PG/ML
WBC # FLD AUTO: 10.42 K/UL
WHITE BLOOD CELLS URINE: 246 /HPF

## 2024-03-20 LAB — BACTERIA UR CULT: ABNORMAL

## 2024-03-20 RX ORDER — NITROFURANTOIN (MONOHYDRATE/MACROCRYSTALS) 25; 75 MG/1; MG/1
100 CAPSULE ORAL
Qty: 14 | Refills: 0 | Status: ACTIVE | COMMUNITY
Start: 2024-03-20 | End: 1900-01-01

## 2024-04-02 NOTE — PROGRESS NOTE ADULT - PROBLEM SELECTOR PLAN 5
- Hx of b/l hearing loss, was prescribed hearing aids, does not wear at baseline  Encourage hearing aids daily  
continue meds
continue meds

## 2024-04-27 RX ORDER — LOSARTAN POTASSIUM 100 MG/1
100 TABLET, FILM COATED ORAL
Qty: 90 | Refills: 3 | Status: ACTIVE | COMMUNITY
Start: 2024-02-08 | End: 1900-01-01

## 2024-06-02 ENCOUNTER — RX RENEWAL (OUTPATIENT)
Age: 64
End: 2024-06-02

## 2024-06-02 RX ORDER — POTASSIUM CHLORIDE 600 MG/1
8 CAPSULE, EXTENDED RELEASE ORAL
Qty: 90 | Refills: 3 | Status: ACTIVE | COMMUNITY
Start: 2022-05-29 | End: 1900-01-01

## 2024-06-21 NOTE — PRE-OP CHECKLIST - HEART RATE (BEATS/MIN)
Pt arrives ambulatory for B12 Injection  Pt denies complaints or concerns  Injection complete without incident and patient discharged in stable condition  Next appt  7/19 B12   56

## 2024-07-08 ENCOUNTER — LABORATORY RESULT (OUTPATIENT)
Age: 64
End: 2024-07-08

## 2024-07-11 ENCOUNTER — LABORATORY RESULT (OUTPATIENT)
Age: 64
End: 2024-07-11

## 2024-07-11 ENCOUNTER — APPOINTMENT (OUTPATIENT)
Dept: INTERNAL MEDICINE | Facility: CLINIC | Age: 64
End: 2024-07-11
Payer: MEDICARE

## 2024-07-11 VITALS — DIASTOLIC BLOOD PRESSURE: 90 MMHG | SYSTOLIC BLOOD PRESSURE: 170 MMHG

## 2024-07-11 DIAGNOSIS — R91.1 SOLITARY PULMONARY NODULE: ICD-10-CM

## 2024-07-11 DIAGNOSIS — I10 ESSENTIAL (PRIMARY) HYPERTENSION: ICD-10-CM

## 2024-07-11 DIAGNOSIS — C67.9 MALIGNANT NEOPLASM OF BLADDER, UNSPECIFIED: ICD-10-CM

## 2024-07-11 DIAGNOSIS — N18.2 CHRONIC KIDNEY DISEASE, STAGE 2 (MILD): ICD-10-CM

## 2024-07-11 DIAGNOSIS — E66.01 MORBID (SEVERE) OBESITY DUE TO EXCESS CALORIES: ICD-10-CM

## 2024-07-11 PROCEDURE — G2211 COMPLEX E/M VISIT ADD ON: CPT

## 2024-07-11 PROCEDURE — 99214 OFFICE O/P EST MOD 30 MIN: CPT

## 2024-07-11 PROCEDURE — 36415 COLL VENOUS BLD VENIPUNCTURE: CPT

## 2024-07-11 RX ORDER — TRIAMTERENE AND HYDROCHLOROTHIAZIDE 37.5; 25 MG/1; MG/1
37.5-25 CAPSULE ORAL DAILY
Qty: 30 | Refills: 3 | Status: ACTIVE | COMMUNITY
Start: 2024-07-11 | End: 1900-01-01

## 2024-07-11 RX ORDER — DOXYCYCLINE HYCLATE 100 MG/1
100 CAPSULE ORAL
Qty: 14 | Refills: 0 | Status: ACTIVE | COMMUNITY
Start: 2024-07-11 | End: 1900-01-01

## 2024-07-14 LAB
ALBUMIN SERPL ELPH-MCNC: 4.2 G/DL
ALT SERPL-CCNC: 13 U/L
ANION GAP SERPL CALC-SCNC: 13 MMOL/L
APPEARANCE: ABNORMAL
AST SERPL-CCNC: 11 U/L
BACTERIA: ABNORMAL /HPF
BASOPHILS # BLD AUTO: 0.08 K/UL
BASOPHILS NFR BLD AUTO: 0.8 %
BILIRUB SERPL-MCNC: 0.4 MG/DL
BILIRUBIN URINE: NEGATIVE
BLOOD URINE: NEGATIVE
BUN SERPL-MCNC: 25 MG/DL
CALCIUM SERPL-MCNC: 9.3 MG/DL
CAST: 4 /LPF
CHLORIDE SERPL-SCNC: 102 MMOL/L
CHOLEST SERPL-MCNC: 136 MG/DL
CO2 SERPL-SCNC: 24 MMOL/L
COLOR: YELLOW
CREAT SERPL-MCNC: 1.11 MG/DL
EGFR: 74 ML/MIN/1.73M2
EOSINOPHIL NFR BLD AUTO: 4.9 %
EPITHELIAL CELLS: 1 /HPF
ESTIMATED AVERAGE GLUCOSE: 117 MG/DL
GLUCOSE QUALITATIVE U: NEGATIVE MG/DL
GLUCOSE SERPL-MCNC: 102 MG/DL
HBA1C MFR BLD HPLC: 5.7 %
HCT VFR BLD CALC: 35.3 %
HDLC SERPL-MCNC: 46 MG/DL
IMM GRANULOCYTES NFR BLD AUTO: 0.2 %
KETONES URINE: NEGATIVE MG/DL
LDLC SERPL CALC-MCNC: 70 MG/DL
LEUKOCYTE ESTERASE URINE: ABNORMAL
LYMPHOCYTES NFR BLD AUTO: 14.9 %
MCHC RBC-ENTMCNC: 34.8 GM/DL
MCV RBC AUTO: 84 FL
MICROSCOPIC-UA: NORMAL
MONOCYTES # BLD AUTO: 1.09 K/UL
MONOCYTES NFR BLD AUTO: 11.5 %
NEUTROPHILS # BLD AUTO: 6.42 K/UL
NEUTROPHILS NFR BLD AUTO: 67.7 %
NITRITE URINE: POSITIVE
NONHDLC SERPL-MCNC: 90 MG/DL
PH URINE: 5.5
PLATELET # BLD AUTO: 285 K/UL
POTASSIUM SERPL-SCNC: 4.1 MMOL/L
PROT SERPL-MCNC: 6.9 G/DL
PROTEIN URINE: 100 MG/DL
RBC # BLD: 4.2 M/UL
RBC # FLD: 14 %
RED BLOOD CELLS URINE: 1 /HPF
SODIUM SERPL-SCNC: 139 MMOL/L
SPECIFIC GRAVITY URINE: 1.02
T4 SERPL-MCNC: 7.9 UG/DL
TRIGL SERPL-MCNC: 107 MG/DL
TSH SERPL-ACNC: 1.4 UIU/ML
URATE SERPL-MCNC: 4.9 MG/DL
UROBILINOGEN URINE: 1 MG/DL
WBC # FLD AUTO: 9.48 K/UL
WHITE BLOOD CELLS URINE: 145 /HPF

## 2024-08-15 ENCOUNTER — APPOINTMENT (OUTPATIENT)
Dept: INTERNAL MEDICINE | Facility: CLINIC | Age: 64
End: 2024-08-15
Payer: MEDICARE

## 2024-08-15 VITALS — WEIGHT: 202 LBS | HEIGHT: 62 IN | BODY MASS INDEX: 37.17 KG/M2

## 2024-08-15 VITALS — SYSTOLIC BLOOD PRESSURE: 138 MMHG | DIASTOLIC BLOOD PRESSURE: 78 MMHG

## 2024-08-15 DIAGNOSIS — E11.9 TYPE 2 DIABETES MELLITUS W/OUT COMPLICATIONS: ICD-10-CM

## 2024-08-15 DIAGNOSIS — R60.0 LOCALIZED EDEMA: ICD-10-CM

## 2024-08-15 DIAGNOSIS — I10 ESSENTIAL (PRIMARY) HYPERTENSION: ICD-10-CM

## 2024-08-15 DIAGNOSIS — C67.9 MALIGNANT NEOPLASM OF BLADDER, UNSPECIFIED: ICD-10-CM

## 2024-08-15 PROCEDURE — G2211 COMPLEX E/M VISIT ADD ON: CPT

## 2024-08-15 PROCEDURE — 99214 OFFICE O/P EST MOD 30 MIN: CPT

## 2024-08-15 NOTE — PHYSICAL EXAM
[Normal] : soft, non-tender, non-distended, no masses palpated, no HSM and normal bowel sounds [de-identified] : Edema has resolved

## 2024-08-15 NOTE — ASSESSMENT
[FreeTextEntry1] : Assessment Hypertension-the patient blood pressure today was 138/ over 68.  In addition his edema has resolved.  I advised the patient to continue his Maxide 25 and his amlodipine 2-1/2 mg daily atenolol 50 mg twice a day losartan 100 mg daily. Recurrent UTIs-a urine culture was obtained today.  I also informed the patient to continue taking vitamin C 500 mg twice a day, cranberry tablets to twice daily Bladder carcinoma he is being followed by his urologist And I advised the patient that it has been 5 years since his last colonoscopy and he is due for another one

## 2024-08-15 NOTE — HISTORY OF PRESENT ILLNESS
[de-identified] : This is a 64-year-old gentleman with a history of bladder carcinoma, hypertension, hypercholesterolemia, diabetes mellitus who on last visit was found to have a urinary tract infection and in addition his blood pressure was markedly elevated and also had 2-3+ edema.  The patient's hydrochlorothiazide was discontinued and instead was started on Maxide 25 mg and his urinary tract was treated with antibiotics

## 2024-08-16 LAB
APPEARANCE: CLEAR
BACTERIA: NEGATIVE /HPF
BILIRUBIN URINE: NEGATIVE
BLOOD URINE: NEGATIVE
CAST: 1 /LPF
COLOR: YELLOW
EPITHELIAL CELLS: 1 /HPF
GLUCOSE QUALITATIVE U: NEGATIVE MG/DL
KETONES URINE: NEGATIVE MG/DL
LEUKOCYTE ESTERASE URINE: NEGATIVE
MICROSCOPIC-UA: NORMAL
NITRITE URINE: NEGATIVE
PH URINE: 6
PROTEIN URINE: 30 MG/DL
RED BLOOD CELLS URINE: 0 /HPF
SPECIFIC GRAVITY URINE: 1.02
UROBILINOGEN URINE: 0.2 MG/DL
WHITE BLOOD CELLS URINE: 0 /HPF

## 2024-08-17 LAB — BACTERIA UR CULT: NORMAL

## 2024-10-08 ENCOUNTER — NON-APPOINTMENT (OUTPATIENT)
Age: 64
End: 2024-10-08

## 2025-01-06 ENCOUNTER — LABORATORY RESULT (OUTPATIENT)
Age: 65
End: 2025-01-06

## 2025-01-06 ENCOUNTER — APPOINTMENT (OUTPATIENT)
Dept: INTERNAL MEDICINE | Facility: CLINIC | Age: 65
End: 2025-01-06

## 2025-01-06 VITALS — BODY MASS INDEX: 36.44 KG/M2 | HEIGHT: 62 IN | WEIGHT: 198 LBS

## 2025-01-06 VITALS — DIASTOLIC BLOOD PRESSURE: 70 MMHG | SYSTOLIC BLOOD PRESSURE: 130 MMHG

## 2025-01-06 DIAGNOSIS — E66.01 MORBID (SEVERE) OBESITY DUE TO EXCESS CALORIES: ICD-10-CM

## 2025-01-06 DIAGNOSIS — I10 ESSENTIAL (PRIMARY) HYPERTENSION: ICD-10-CM

## 2025-01-06 DIAGNOSIS — C67.9 MALIGNANT NEOPLASM OF BLADDER, UNSPECIFIED: ICD-10-CM

## 2025-01-06 DIAGNOSIS — N18.2 CHRONIC KIDNEY DISEASE, STAGE 2 (MILD): ICD-10-CM

## 2025-01-06 DIAGNOSIS — Z98.84 BARIATRIC SURGERY STATUS: ICD-10-CM

## 2025-01-06 PROCEDURE — 99214 OFFICE O/P EST MOD 30 MIN: CPT | Mod: 25

## 2025-01-06 PROCEDURE — 36415 COLL VENOUS BLD VENIPUNCTURE: CPT

## 2025-01-06 PROCEDURE — G2211 COMPLEX E/M VISIT ADD ON: CPT

## 2025-01-06 PROCEDURE — 90656 IIV3 VACC NO PRSV 0.5 ML IM: CPT

## 2025-01-06 PROCEDURE — G0008: CPT

## 2025-01-07 ENCOUNTER — NON-APPOINTMENT (OUTPATIENT)
Age: 65
End: 2025-01-07

## 2025-01-07 LAB
ALBUMIN SERPL ELPH-MCNC: 4 G/DL
ALP BLD-CCNC: 100 U/L
ALT SERPL-CCNC: 13 U/L
ANION GAP SERPL CALC-SCNC: 12 MMOL/L
APPEARANCE: CLEAR
AST SERPL-CCNC: 12 U/L
BACTERIA: NEGATIVE /HPF
BASOPHILS # BLD AUTO: 0.07 K/UL
BASOPHILS NFR BLD AUTO: 0.8 %
BILIRUB SERPL-MCNC: 0.4 MG/DL
BILIRUBIN URINE: NEGATIVE
BLOOD URINE: NEGATIVE
BUN SERPL-MCNC: 41 MG/DL
CALCIUM SERPL-MCNC: 9.5 MG/DL
CAST: 0 /LPF
CHLORIDE SERPL-SCNC: 103 MMOL/L
CHOLEST SERPL-MCNC: 148 MG/DL
CO2 SERPL-SCNC: 25 MMOL/L
COLOR: YELLOW
CREAT SERPL-MCNC: 1.3 MG/DL
EGFR: 61 ML/MIN/1.73M2
EOSINOPHIL # BLD AUTO: 0.41 K/UL
EOSINOPHIL NFR BLD AUTO: 4.9 %
EPITHELIAL CELLS: 2 /HPF
ESTIMATED AVERAGE GLUCOSE: 117 MG/DL
GLUCOSE QUALITATIVE U: NEGATIVE MG/DL
GLUCOSE SERPL-MCNC: 96 MG/DL
HBA1C MFR BLD HPLC: 5.7 %
HCT VFR BLD CALC: 34.5 %
HDLC SERPL-MCNC: 43 MG/DL
HGB BLD-MCNC: 12 G/DL
IMM GRANULOCYTES NFR BLD AUTO: 0.2 %
KETONES URINE: NEGATIVE MG/DL
LDLC SERPL CALC-MCNC: 88 MG/DL
LEUKOCYTE ESTERASE URINE: ABNORMAL
LYMPHOCYTES # BLD AUTO: 1.36 K/UL
LYMPHOCYTES NFR BLD AUTO: 16.1 %
MAN DIFF?: NORMAL
MCHC RBC-ENTMCNC: 30.2 PG
MCHC RBC-ENTMCNC: 34.8 G/DL
MCV RBC AUTO: 86.9 FL
MICROSCOPIC-UA: NORMAL
MONOCYTES # BLD AUTO: 0.99 K/UL
MONOCYTES NFR BLD AUTO: 11.7 %
NEUTROPHILS # BLD AUTO: 5.58 K/UL
NEUTROPHILS NFR BLD AUTO: 66.3 %
NITRITE URINE: NEGATIVE
NONHDLC SERPL-MCNC: 105 MG/DL
PH URINE: 5.5
PLATELET # BLD AUTO: 243 K/UL
POTASSIUM SERPL-SCNC: 4 MMOL/L
PROT SERPL-MCNC: 6.4 G/DL
PROTEIN URINE: NORMAL MG/DL
RBC # BLD: 3.97 M/UL
RBC # FLD: 13.4 %
RED BLOOD CELLS URINE: 0 /HPF
SODIUM SERPL-SCNC: 140 MMOL/L
SPECIFIC GRAVITY URINE: 1.02
T3RU NFR SERPL: 1 TBI
T4 SERPL-MCNC: 7.9 UG/DL
TRIGL SERPL-MCNC: 85 MG/DL
TSH SERPL-ACNC: 0.68 UIU/ML
URATE SERPL-MCNC: 6.3 MG/DL
UROBILINOGEN URINE: 0.2 MG/DL
WBC # FLD AUTO: 8.43 K/UL
WHITE BLOOD CELLS URINE: 2 /HPF

## 2025-01-08 ENCOUNTER — NON-APPOINTMENT (OUTPATIENT)
Age: 65
End: 2025-01-08

## 2025-01-10 LAB — BACTERIA UR CULT: ABNORMAL

## 2025-01-10 RX ORDER — DOXYCYCLINE HYCLATE 100 MG/1
100 CAPSULE ORAL
Qty: 14 | Refills: 0 | Status: ACTIVE | COMMUNITY
Start: 2025-01-10 | End: 1900-01-01

## 2025-02-04 ENCOUNTER — OUTPATIENT (OUTPATIENT)
Dept: OUTPATIENT SERVICES | Facility: HOSPITAL | Age: 65
LOS: 1 days | Discharge: HOME | End: 2025-02-04

## 2025-02-04 DIAGNOSIS — Z98.890 OTHER SPECIFIED POSTPROCEDURAL STATES: Chronic | ICD-10-CM

## 2025-02-04 DIAGNOSIS — Z98.84 BARIATRIC SURGERY STATUS: Chronic | ICD-10-CM

## 2025-02-04 DIAGNOSIS — D64.9 ANEMIA, UNSPECIFIED: ICD-10-CM

## 2025-02-05 ENCOUNTER — APPOINTMENT (OUTPATIENT)
Dept: HEMATOLOGY ONCOLOGY | Facility: CLINIC | Age: 65
End: 2025-02-05

## 2025-02-05 ENCOUNTER — RX RENEWAL (OUTPATIENT)
Age: 65
End: 2025-02-05

## 2025-02-05 ENCOUNTER — RESULT REVIEW (OUTPATIENT)
Age: 65
End: 2025-02-05

## 2025-02-05 VITALS
TEMPERATURE: 98.2 F | WEIGHT: 193.98 LBS | HEIGHT: 62 IN | DIASTOLIC BLOOD PRESSURE: 63 MMHG | SYSTOLIC BLOOD PRESSURE: 169 MMHG | BODY MASS INDEX: 35.7 KG/M2 | OXYGEN SATURATION: 99 % | RESPIRATION RATE: 16 BRPM | HEART RATE: 60 BPM

## 2025-02-05 DIAGNOSIS — D64.9 ANEMIA, UNSPECIFIED: ICD-10-CM

## 2025-02-05 LAB
BASOPHILS # BLD AUTO: 0.06 K/UL — SIGNIFICANT CHANGE UP (ref 0–0.2)
BASOPHILS NFR BLD AUTO: 0.7 % — SIGNIFICANT CHANGE UP (ref 0–2)
EOSINOPHIL # BLD AUTO: 0.39 K/UL — SIGNIFICANT CHANGE UP (ref 0–0.5)
EOSINOPHIL NFR BLD AUTO: 4.6 % — SIGNIFICANT CHANGE UP (ref 0–6)
HCT VFR BLD CALC: 36.8 % — LOW (ref 39–50)
HGB BLD-MCNC: 13.2 G/DL — SIGNIFICANT CHANGE UP (ref 13–17)
IMM GRANULOCYTES NFR BLD AUTO: 0.2 % — SIGNIFICANT CHANGE UP (ref 0–0.9)
LYMPHOCYTES # BLD AUTO: 1.6 K/UL — SIGNIFICANT CHANGE UP (ref 1–3.3)
LYMPHOCYTES # BLD AUTO: 18.9 % — SIGNIFICANT CHANGE UP (ref 13–44)
MCHC RBC-ENTMCNC: 30.6 PG — SIGNIFICANT CHANGE UP (ref 27–34)
MCHC RBC-ENTMCNC: 35.9 G/DL — SIGNIFICANT CHANGE UP (ref 32–36)
MCV RBC AUTO: 85.2 FL — SIGNIFICANT CHANGE UP (ref 80–100)
MONOCYTES # BLD AUTO: 0.93 K/UL — HIGH (ref 0–0.9)
MONOCYTES NFR BLD AUTO: 11 % — SIGNIFICANT CHANGE UP (ref 2–14)
NEUTROPHILS # BLD AUTO: 5.48 K/UL — SIGNIFICANT CHANGE UP (ref 1.8–7.4)
NEUTROPHILS NFR BLD AUTO: 64.6 % — SIGNIFICANT CHANGE UP (ref 43–77)
NRBC # BLD: 0 /100 WBCS — SIGNIFICANT CHANGE UP (ref 0–0)
NRBC BLD-RTO: 0 /100 WBCS — SIGNIFICANT CHANGE UP (ref 0–0)
PLATELET # BLD AUTO: 234 K/UL — SIGNIFICANT CHANGE UP (ref 150–400)
RBC # BLD: 4.32 M/UL — SIGNIFICANT CHANGE UP (ref 4.2–5.8)
RBC # FLD: 13.1 % — SIGNIFICANT CHANGE UP (ref 10.3–14.5)
RETICS #: 54.4 K/UL — SIGNIFICANT CHANGE UP (ref 25–125)
RETICS/RBC NFR: 1.3 % — SIGNIFICANT CHANGE UP (ref 0.5–2.5)
WBC # BLD: 8.48 K/UL — SIGNIFICANT CHANGE UP (ref 3.8–10.5)
WBC # FLD AUTO: 8.48 K/UL — SIGNIFICANT CHANGE UP (ref 3.8–10.5)

## 2025-02-05 PROCEDURE — 99204 OFFICE O/P NEW MOD 45 MIN: CPT

## 2025-02-05 RX ORDER — ASCORBIC ACID 500 MG
500 TABLET ORAL
Refills: 0 | Status: ACTIVE | COMMUNITY

## 2025-02-05 RX ORDER — TRIAMTERENE AND HYDROCHLOROTHIAZIDE 25; 37.5 MG/1; MG/1
37.5-25 TABLET ORAL DAILY
Refills: 0 | Status: ACTIVE | COMMUNITY

## 2025-02-05 RX ORDER — MULTIVITAMIN/IRON/FOLIC ACID 18MG-0.4MG
TABLET ORAL DAILY
Refills: 0 | Status: ACTIVE | COMMUNITY

## 2025-02-12 ENCOUNTER — NON-APPOINTMENT (OUTPATIENT)
Age: 65
End: 2025-02-12

## 2025-02-12 LAB
ALBUMIN MFR SERPL ELPH: 60.4 %
ALBUMIN SERPL ELPH-MCNC: 4.1 G/DL
ALBUMIN SERPL-MCNC: 3.9 G/DL
ALBUMIN/GLOB SERPL: 1.5 RATIO
ALP BLD-CCNC: 99 U/L
ALPHA1 GLOB MFR SERPL ELPH: 4.5 %
ALPHA1 GLOB SERPL ELPH-MCNC: 0.3 G/DL
ALPHA2 GLOB MFR SERPL ELPH: 10.2 %
ALPHA2 GLOB SERPL ELPH-MCNC: 0.7 G/DL
ALT SERPL-CCNC: 14 U/L
ANION GAP SERPL CALC-SCNC: 14 MMOL/L
AST SERPL-CCNC: 14 U/L
B-GLOBULIN MFR SERPL ELPH: 11.5 %
B-GLOBULIN SERPL ELPH-MCNC: 0.7 G/DL
BILIRUB SERPL-MCNC: 0.6 MG/DL
BUN SERPL-MCNC: 39 MG/DL
CALCIUM SERPL-MCNC: 9.9 MG/DL
CHLORIDE SERPL-SCNC: 101 MMOL/L
CO2 SERPL-SCNC: 23 MMOL/L
CREAT SERPL-MCNC: 1.34 MG/DL
DEPRECATED KAPPA LC FREE/LAMBDA SER: 1.64 RATIO
EGFR: 59 ML/MIN/1.73M2
FERRITIN SERPL-MCNC: 118 NG/ML
FOLATE SERPL-MCNC: >20 NG/ML
GAMMA GLOB FLD ELPH-MCNC: 0.9 G/DL
GAMMA GLOB MFR SERPL ELPH: 13.4 %
GLUCOSE SERPL-MCNC: 108 MG/DL
HAPTOGLOB SERPL-MCNC: 135 MG/DL
IGA SER QL IEP: 215 MG/DL
IGG SER QL IEP: 848 MG/DL
IGM SER QL IEP: 142 MG/DL
INTERPRETATION SERPL IEP-IMP: NORMAL
IRON SATN MFR SERPL: 27 %
IRON SERPL-MCNC: 76 UG/DL
KAPPA LC CSF-MCNC: 2.57 MG/DL
KAPPA LC SERPL-MCNC: 4.22 MG/DL
LDH SERPL-CCNC: 172 U/L
M PROTEIN SPEC IFE-MCNC: NORMAL
POTASSIUM SERPL-SCNC: 4.1 MMOL/L
PROT SERPL-MCNC: 6.5 G/DL
SODIUM SERPL-SCNC: 138 MMOL/L
TIBC SERPL-MCNC: 282 UG/DL
UIBC SERPL-MCNC: 206 UG/DL
VIT B12 SERPL-MCNC: >2000 PG/ML

## 2025-02-27 ENCOUNTER — APPOINTMENT (OUTPATIENT)
Dept: BARIATRICS | Facility: CLINIC | Age: 65
End: 2025-02-27
Payer: MEDICARE

## 2025-02-27 VITALS
TEMPERATURE: 97.3 F | DIASTOLIC BLOOD PRESSURE: 82 MMHG | HEIGHT: 62 IN | BODY MASS INDEX: 36.62 KG/M2 | WEIGHT: 199 LBS | HEART RATE: 60 BPM | OXYGEN SATURATION: 97 % | SYSTOLIC BLOOD PRESSURE: 146 MMHG

## 2025-02-27 DIAGNOSIS — E66.01 MORBID (SEVERE) OBESITY DUE TO EXCESS CALORIES: ICD-10-CM

## 2025-02-27 DIAGNOSIS — D64.9 ANEMIA, UNSPECIFIED: ICD-10-CM

## 2025-02-27 DIAGNOSIS — Z98.84 BARIATRIC SURGERY STATUS: ICD-10-CM

## 2025-02-27 DIAGNOSIS — G47.30 SLEEP APNEA, UNSPECIFIED: ICD-10-CM

## 2025-02-27 DIAGNOSIS — E11.9 TYPE 2 DIABETES MELLITUS W/OUT COMPLICATIONS: ICD-10-CM

## 2025-02-27 PROCEDURE — 99214 OFFICE O/P EST MOD 30 MIN: CPT

## 2025-04-17 ENCOUNTER — APPOINTMENT (OUTPATIENT)
Dept: INTERNAL MEDICINE | Facility: CLINIC | Age: 65
End: 2025-04-17
Payer: MEDICARE

## 2025-04-17 VITALS — BODY MASS INDEX: 34.96 KG/M2 | WEIGHT: 190 LBS | HEIGHT: 62 IN

## 2025-04-17 VITALS — DIASTOLIC BLOOD PRESSURE: 80 MMHG | SYSTOLIC BLOOD PRESSURE: 138 MMHG

## 2025-04-17 DIAGNOSIS — I10 ESSENTIAL (PRIMARY) HYPERTENSION: ICD-10-CM

## 2025-04-17 DIAGNOSIS — E11.9 TYPE 2 DIABETES MELLITUS W/OUT COMPLICATIONS: ICD-10-CM

## 2025-04-17 DIAGNOSIS — C67.9 MALIGNANT NEOPLASM OF BLADDER, UNSPECIFIED: ICD-10-CM

## 2025-04-17 DIAGNOSIS — Z80.1 FAMILY HISTORY OF MALIGNANT NEOPLASM OF TRACHEA, BRONCHUS AND LUNG: ICD-10-CM

## 2025-04-17 DIAGNOSIS — K31.84 GASTROPARESIS: ICD-10-CM

## 2025-04-17 PROCEDURE — 36415 COLL VENOUS BLD VENIPUNCTURE: CPT

## 2025-04-17 PROCEDURE — G2211 COMPLEX E/M VISIT ADD ON: CPT

## 2025-04-17 PROCEDURE — 99214 OFFICE O/P EST MOD 30 MIN: CPT

## 2025-04-18 LAB
APPEARANCE: CLEAR
BACTERIA: NEGATIVE /HPF
BILIRUBIN URINE: NEGATIVE
BLOOD URINE: NEGATIVE
CAST: 2 /LPF
COLOR: YELLOW
EPITHELIAL CELLS: 2 /HPF
GLUCOSE QUALITATIVE U: NEGATIVE MG/DL
KETONES URINE: NEGATIVE MG/DL
LEUKOCYTE ESTERASE URINE: ABNORMAL
MICROSCOPIC-UA: NORMAL
NITRITE URINE: NEGATIVE
PH URINE: 5.5
PROTEIN URINE: NORMAL MG/DL
PSA SERPL-MCNC: 1.36 NG/ML
RED BLOOD CELLS URINE: 1 /HPF
SPECIFIC GRAVITY URINE: 1.02
UROBILINOGEN URINE: 0.2 MG/DL
WHITE BLOOD CELLS URINE: 0 /HPF

## 2025-04-19 LAB — BACTERIA UR CULT: NORMAL

## 2025-05-09 ENCOUNTER — NON-APPOINTMENT (OUTPATIENT)
Age: 65
End: 2025-05-09

## 2025-06-09 ENCOUNTER — RX RENEWAL (OUTPATIENT)
Age: 65
End: 2025-06-09

## 2025-07-23 ENCOUNTER — RX RENEWAL (OUTPATIENT)
Age: 65
End: 2025-07-23

## 2025-08-18 ENCOUNTER — APPOINTMENT (OUTPATIENT)
Dept: INTERNAL MEDICINE | Facility: CLINIC | Age: 65
End: 2025-08-18
Payer: MEDICARE

## 2025-08-18 VITALS
WEIGHT: 196 LBS | SYSTOLIC BLOOD PRESSURE: 130 MMHG | DIASTOLIC BLOOD PRESSURE: 80 MMHG | HEIGHT: 62 IN | BODY MASS INDEX: 36.07 KG/M2

## 2025-08-18 DIAGNOSIS — C67.9 MALIGNANT NEOPLASM OF BLADDER, UNSPECIFIED: ICD-10-CM

## 2025-08-18 DIAGNOSIS — G47.30 SLEEP APNEA, UNSPECIFIED: ICD-10-CM

## 2025-08-18 DIAGNOSIS — E11.9 TYPE 2 DIABETES MELLITUS W/OUT COMPLICATIONS: ICD-10-CM

## 2025-08-18 DIAGNOSIS — Z00.00 ENCOUNTER FOR GENERAL ADULT MEDICAL EXAMINATION W/OUT ABNORMAL FINDINGS: ICD-10-CM

## 2025-08-18 DIAGNOSIS — I10 ESSENTIAL (PRIMARY) HYPERTENSION: ICD-10-CM

## 2025-08-18 DIAGNOSIS — Z98.84 BARIATRIC SURGERY STATUS: ICD-10-CM

## 2025-08-18 DIAGNOSIS — R91.1 SOLITARY PULMONARY NODULE: ICD-10-CM

## 2025-08-18 PROCEDURE — 93000 ELECTROCARDIOGRAM COMPLETE: CPT

## 2025-08-18 PROCEDURE — G0439: CPT

## 2025-08-18 PROCEDURE — 36415 COLL VENOUS BLD VENIPUNCTURE: CPT

## 2025-08-19 DIAGNOSIS — N39.0 URINARY TRACT INFECTION, SITE NOT SPECIFIED: ICD-10-CM

## 2025-08-19 DIAGNOSIS — A49.9 URINARY TRACT INFECTION, SITE NOT SPECIFIED: ICD-10-CM

## 2025-08-19 LAB
ALBUMIN SERPL ELPH-MCNC: 3.9 G/DL
ALP BLD-CCNC: 95 U/L
ALT SERPL-CCNC: 16 U/L
ANION GAP SERPL CALC-SCNC: 14 MMOL/L
APPEARANCE: ABNORMAL
AST SERPL-CCNC: 12 U/L
BACTERIA: ABNORMAL /HPF
BASOPHILS # BLD AUTO: 0.07 K/UL
BASOPHILS NFR BLD AUTO: 0.6 %
BILIRUB SERPL-MCNC: 0.5 MG/DL
BILIRUBIN URINE: NEGATIVE
BLOOD URINE: ABNORMAL
BUN SERPL-MCNC: 30 MG/DL
CALCIUM SERPL-MCNC: 9.3 MG/DL
CAST: 4 /LPF
CHLORIDE SERPL-SCNC: 104 MMOL/L
CO2 SERPL-SCNC: 23 MMOL/L
COLOR: YELLOW
CREAT SERPL-MCNC: 1.29 MG/DL
EGFRCR SERPLBLD CKD-EPI 2021: 62 ML/MIN/1.73M2
EOSINOPHIL # BLD AUTO: 0.46 K/UL
EOSINOPHIL NFR BLD AUTO: 4.2 %
EPITHELIAL CELLS: 1 /HPF
ESTIMATED AVERAGE GLUCOSE: 117 MG/DL
GLUCOSE QUALITATIVE U: NEGATIVE MG/DL
GLUCOSE SERPL-MCNC: 89 MG/DL
HBA1C MFR BLD HPLC: 5.7 %
HCT VFR BLD CALC: 36.6 %
HGB BLD-MCNC: 12.4 G/DL
IMM GRANULOCYTES NFR BLD AUTO: 0.3 %
KETONES URINE: NEGATIVE MG/DL
LEUKOCYTE ESTERASE URINE: ABNORMAL
LYMPHOCYTES # BLD AUTO: 1.04 K/UL
LYMPHOCYTES NFR BLD AUTO: 9.5 %
MAN DIFF?: NORMAL
MCHC RBC-ENTMCNC: 30 PG
MCHC RBC-ENTMCNC: 33.9 G/DL
MCV RBC AUTO: 88.6 FL
MICROSCOPIC-UA: NORMAL
MONOCYTES # BLD AUTO: 1.16 K/UL
MONOCYTES NFR BLD AUTO: 10.6 %
NEUTROPHILS # BLD AUTO: 8.21 K/UL
NEUTROPHILS NFR BLD AUTO: 74.8 %
NITRITE URINE: POSITIVE
PH URINE: 5.5
PLATELET # BLD AUTO: 221 K/UL
POTASSIUM SERPL-SCNC: 3.7 MMOL/L
PROT SERPL-MCNC: 6.1 G/DL
PROTEIN URINE: 30 MG/DL
RBC # BLD: 4.13 M/UL
RBC # FLD: 13.5 %
RED BLOOD CELLS URINE: 0 /HPF
SODIUM SERPL-SCNC: 141 MMOL/L
SPECIFIC GRAVITY URINE: 1.02
URINE CYTOLOGY: NORMAL
UROBILINOGEN URINE: 0.2 MG/DL
WBC # FLD AUTO: 10.97 K/UL
WHITE BLOOD CELLS URINE: 316 /HPF

## 2025-08-19 RX ORDER — DOXYCYCLINE HYCLATE 100 MG/1
100 CAPSULE ORAL
Qty: 14 | Refills: 0 | Status: ACTIVE | COMMUNITY
Start: 2025-08-19 | End: 1900-01-01

## 2025-08-29 ENCOUNTER — LABORATORY RESULT (OUTPATIENT)
Age: 65
End: 2025-08-29

## 2025-08-30 LAB
APPEARANCE: ABNORMAL
BILIRUBIN URINE: NEGATIVE
BLOOD URINE: NEGATIVE
COLOR: YELLOW
GLUCOSE QUALITATIVE U: NEGATIVE MG/DL
KETONES URINE: NEGATIVE MG/DL
LEUKOCYTE ESTERASE URINE: ABNORMAL
NITRITE URINE: NEGATIVE
PH URINE: 6
PROTEIN URINE: 30 MG/DL
SPECIFIC GRAVITY URINE: 1.02
UROBILINOGEN URINE: 0.2 MG/DL

## 2025-08-31 LAB — BACTERIA UR CULT: NORMAL
